# Patient Record
Sex: FEMALE | Race: WHITE | URBAN - METROPOLITAN AREA
[De-identification: names, ages, dates, MRNs, and addresses within clinical notes are randomized per-mention and may not be internally consistent; named-entity substitution may affect disease eponyms.]

---

## 2017-12-01 ENCOUNTER — APPOINTMENT (OUTPATIENT)
Dept: CT IMAGING | Facility: CLINIC | Age: 82
DRG: 066 | End: 2017-12-01
Attending: EMERGENCY MEDICINE
Payer: MEDICARE

## 2017-12-01 ENCOUNTER — HOSPITAL ENCOUNTER (INPATIENT)
Facility: CLINIC | Age: 82
LOS: 3 days | Discharge: HOME | DRG: 066 | End: 2017-12-04
Attending: EMERGENCY MEDICINE | Admitting: INTERNAL MEDICINE
Payer: MEDICARE

## 2017-12-01 ENCOUNTER — APPOINTMENT (OUTPATIENT)
Dept: MRI IMAGING | Facility: CLINIC | Age: 82
DRG: 066 | End: 2017-12-01
Attending: INTERNAL MEDICINE
Payer: MEDICARE

## 2017-12-01 DIAGNOSIS — I48.0 PAROXYSMAL ATRIAL FIBRILLATION (H): Primary | ICD-10-CM

## 2017-12-01 DIAGNOSIS — I63.9 CEREBROVASCULAR ACCIDENT (CVA), UNSPECIFIED MECHANISM (H): ICD-10-CM

## 2017-12-01 LAB
ANION GAP SERPL CALCULATED.3IONS-SCNC: 8 MMOL/L (ref 3–14)
APTT PPP: 24 SEC (ref 22–37)
BASOPHILS # BLD AUTO: 0 10E9/L (ref 0–0.2)
BASOPHILS NFR BLD AUTO: 0.1 %
BUN SERPL-MCNC: 16 MG/DL (ref 7–30)
CALCIUM SERPL-MCNC: 9.1 MG/DL (ref 8.5–10.1)
CHLORIDE SERPL-SCNC: 108 MMOL/L (ref 94–109)
CHOLEST SERPL-MCNC: 137 MG/DL
CO2 SERPL-SCNC: 24 MMOL/L (ref 20–32)
CREAT SERPL-MCNC: 0.94 MG/DL (ref 0.52–1.04)
CRP SERPL-MCNC: <2.9 MG/L (ref 0–8)
DIFFERENTIAL METHOD BLD: NORMAL
EOSINOPHIL # BLD AUTO: 0 10E9/L (ref 0–0.7)
EOSINOPHIL NFR BLD AUTO: 0.1 %
ERYTHROCYTE [DISTWIDTH] IN BLOOD BY AUTOMATED COUNT: 13.3 % (ref 10–15)
GFR SERPL CREATININE-BSD FRML MDRD: 57 ML/MIN/1.7M2
GLUCOSE SERPL-MCNC: 144 MG/DL (ref 70–99)
HBA1C MFR BLD: 6.2 % (ref 4.3–6)
HCT VFR BLD AUTO: 42.2 % (ref 35–47)
HDLC SERPL-MCNC: 65 MG/DL
HGB BLD-MCNC: 14 G/DL (ref 11.7–15.7)
IMM GRANULOCYTES # BLD: 0 10E9/L (ref 0–0.4)
IMM GRANULOCYTES NFR BLD: 0.1 %
INR PPP: 0.98 (ref 0.86–1.14)
LDLC SERPL CALC-MCNC: 53 MG/DL
LYMPHOCYTES # BLD AUTO: 1.5 10E9/L (ref 0.8–5.3)
LYMPHOCYTES NFR BLD AUTO: 19.4 %
MCH RBC QN AUTO: 30.8 PG (ref 26.5–33)
MCHC RBC AUTO-ENTMCNC: 33.2 G/DL (ref 31.5–36.5)
MCV RBC AUTO: 93 FL (ref 78–100)
MONOCYTES # BLD AUTO: 0.5 10E9/L (ref 0–1.3)
MONOCYTES NFR BLD AUTO: 6.5 %
NEUTROPHILS # BLD AUTO: 5.6 10E9/L (ref 1.6–8.3)
NEUTROPHILS NFR BLD AUTO: 73.8 %
NONHDLC SERPL-MCNC: 72 MG/DL
NRBC # BLD AUTO: 0 10*3/UL
NRBC BLD AUTO-RTO: 0 /100
PLATELET # BLD AUTO: 209 10E9/L (ref 150–450)
POTASSIUM SERPL-SCNC: 3.9 MMOL/L (ref 3.4–5.3)
RBC # BLD AUTO: 4.54 10E12/L (ref 3.8–5.2)
SODIUM SERPL-SCNC: 140 MMOL/L (ref 133–144)
TRIGL SERPL-MCNC: 93 MG/DL
TROPONIN I SERPL-MCNC: <0.015 UG/L (ref 0–0.04)
TSH SERPL DL<=0.005 MIU/L-ACNC: 2 MU/L (ref 0.4–4)
WBC # BLD AUTO: 7.5 10E9/L (ref 4–11)

## 2017-12-01 PROCEDURE — 85730 THROMBOPLASTIN TIME PARTIAL: CPT | Performed by: EMERGENCY MEDICINE

## 2017-12-01 PROCEDURE — 36415 COLL VENOUS BLD VENIPUNCTURE: CPT | Performed by: INTERNAL MEDICINE

## 2017-12-01 PROCEDURE — 25000128 H RX IP 250 OP 636: Performed by: INTERNAL MEDICINE

## 2017-12-01 PROCEDURE — 25000132 ZZH RX MED GY IP 250 OP 250 PS 637: Mod: GY | Performed by: EMERGENCY MEDICINE

## 2017-12-01 PROCEDURE — 99223 1ST HOSP IP/OBS HIGH 75: CPT | Mod: AI | Performed by: INTERNAL MEDICINE

## 2017-12-01 PROCEDURE — 80048 BASIC METABOLIC PNL TOTAL CA: CPT | Performed by: EMERGENCY MEDICINE

## 2017-12-01 PROCEDURE — 82306 VITAMIN D 25 HYDROXY: CPT | Performed by: INTERNAL MEDICINE

## 2017-12-01 PROCEDURE — 25000128 H RX IP 250 OP 636: Performed by: EMERGENCY MEDICINE

## 2017-12-01 PROCEDURE — 80061 LIPID PANEL: CPT | Performed by: INTERNAL MEDICINE

## 2017-12-01 PROCEDURE — A9270 NON-COVERED ITEM OR SERVICE: HCPCS | Mod: GY | Performed by: EMERGENCY MEDICINE

## 2017-12-01 PROCEDURE — 85025 COMPLETE CBC W/AUTO DIFF WBC: CPT | Performed by: EMERGENCY MEDICINE

## 2017-12-01 PROCEDURE — 25000125 ZZHC RX 250: Performed by: EMERGENCY MEDICINE

## 2017-12-01 PROCEDURE — 70450 CT HEAD/BRAIN W/O DYE: CPT

## 2017-12-01 PROCEDURE — 12000000 ZZH R&B MED SURG/OB

## 2017-12-01 PROCEDURE — 84443 ASSAY THYROID STIM HORMONE: CPT | Performed by: INTERNAL MEDICINE

## 2017-12-01 PROCEDURE — 70470 CT HEAD/BRAIN W/O & W/DYE: CPT | Mod: XS

## 2017-12-01 PROCEDURE — 84484 ASSAY OF TROPONIN QUANT: CPT | Performed by: INTERNAL MEDICINE

## 2017-12-01 PROCEDURE — 86140 C-REACTIVE PROTEIN: CPT | Performed by: INTERNAL MEDICINE

## 2017-12-01 PROCEDURE — 83036 HEMOGLOBIN GLYCOSYLATED A1C: CPT | Performed by: INTERNAL MEDICINE

## 2017-12-01 PROCEDURE — 99285 EMERGENCY DEPT VISIT HI MDM: CPT | Mod: 25

## 2017-12-01 PROCEDURE — A9585 GADOBUTROL INJECTION: HCPCS | Performed by: INTERNAL MEDICINE

## 2017-12-01 PROCEDURE — 70498 CT ANGIOGRAPHY NECK: CPT

## 2017-12-01 PROCEDURE — 70553 MRI BRAIN STEM W/O & W/DYE: CPT

## 2017-12-01 PROCEDURE — 85610 PROTHROMBIN TIME: CPT | Performed by: EMERGENCY MEDICINE

## 2017-12-01 RX ORDER — ASPIRIN 300 MG/1
300 SUPPOSITORY RECTAL ONCE
Status: COMPLETED | OUTPATIENT
Start: 2017-12-01 | End: 2017-12-01

## 2017-12-01 RX ORDER — LABETALOL HYDROCHLORIDE 5 MG/ML
10-40 INJECTION, SOLUTION INTRAVENOUS EVERY 10 MIN PRN
Status: DISCONTINUED | OUTPATIENT
Start: 2017-12-01 | End: 2017-12-04 | Stop reason: HOSPADM

## 2017-12-01 RX ORDER — GADOBUTROL 604.72 MG/ML
9 INJECTION INTRAVENOUS ONCE
Status: COMPLETED | OUTPATIENT
Start: 2017-12-01 | End: 2017-12-01

## 2017-12-01 RX ORDER — SODIUM CHLORIDE 9 MG/ML
INJECTION, SOLUTION INTRAVENOUS CONTINUOUS
Status: DISCONTINUED | OUTPATIENT
Start: 2017-12-01 | End: 2017-12-03

## 2017-12-01 RX ORDER — IOPAMIDOL 755 MG/ML
120 INJECTION, SOLUTION INTRAVASCULAR ONCE
Status: COMPLETED | OUTPATIENT
Start: 2017-12-01 | End: 2017-12-01

## 2017-12-01 RX ORDER — ONDANSETRON 2 MG/ML
4 INJECTION INTRAMUSCULAR; INTRAVENOUS EVERY 6 HOURS PRN
Status: DISCONTINUED | OUTPATIENT
Start: 2017-12-01 | End: 2017-12-03

## 2017-12-01 RX ORDER — ONDANSETRON 4 MG/1
4 TABLET, ORALLY DISINTEGRATING ORAL EVERY 6 HOURS PRN
Status: DISCONTINUED | OUTPATIENT
Start: 2017-12-01 | End: 2017-12-03

## 2017-12-01 RX ORDER — MULTIVITAMIN,THERAPEUTIC
1 TABLET ORAL DAILY
COMMUNITY
End: 2017-12-01

## 2017-12-01 RX ORDER — NALOXONE HYDROCHLORIDE 0.4 MG/ML
.1-.4 INJECTION, SOLUTION INTRAMUSCULAR; INTRAVENOUS; SUBCUTANEOUS
Status: DISCONTINUED | OUTPATIENT
Start: 2017-12-01 | End: 2017-12-04 | Stop reason: HOSPADM

## 2017-12-01 RX ORDER — HYDRALAZINE HYDROCHLORIDE 20 MG/ML
10-20 INJECTION INTRAMUSCULAR; INTRAVENOUS
Status: DISCONTINUED | OUTPATIENT
Start: 2017-12-01 | End: 2017-12-04 | Stop reason: HOSPADM

## 2017-12-01 RX ORDER — METOPROLOL TARTRATE 1 MG/ML
2.5 INJECTION, SOLUTION INTRAVENOUS EVERY 4 HOURS PRN
Status: DISCONTINUED | OUTPATIENT
Start: 2017-12-01 | End: 2017-12-04 | Stop reason: HOSPADM

## 2017-12-01 RX ORDER — ACETAMINOPHEN 650 MG/1
650 SUPPOSITORY RECTAL EVERY 4 HOURS PRN
Status: DISCONTINUED | OUTPATIENT
Start: 2017-12-01 | End: 2017-12-04 | Stop reason: HOSPADM

## 2017-12-01 RX ADMIN — IOPAMIDOL 120 ML: 755 INJECTION, SOLUTION INTRAVENOUS at 16:00

## 2017-12-01 RX ADMIN — ASPIRIN 300 MG: 300 SUPPOSITORY RECTAL at 17:41

## 2017-12-01 RX ADMIN — GADOBUTROL 9 ML: 604.72 INJECTION INTRAVENOUS at 21:44

## 2017-12-01 RX ADMIN — SODIUM CHLORIDE: 9 INJECTION, SOLUTION INTRAVENOUS at 17:58

## 2017-12-01 RX ADMIN — SODIUM CHLORIDE 100 ML: 9 INJECTION, SOLUTION INTRAVENOUS at 16:00

## 2017-12-01 ASSESSMENT — ENCOUNTER SYMPTOMS
SPEECH DIFFICULTY: 1
WEAKNESS: 1

## 2017-12-01 ASSESSMENT — VISUAL ACUITY: OU: NORMAL ACUITY

## 2017-12-01 ASSESSMENT — ACTIVITIES OF DAILY LIVING (ADL): ADLS_ACUITY_SCORE: 10

## 2017-12-01 NOTE — IP AVS SNAPSHOT
` `     [facility]: [phone]                 INTERAGENCY TRANSFER FORM - NOTES (H&P, Discharge Summary, Consults, Procedures, Therapies)   2017                    Hospital Admission Date: 2017  KEYANNA SEGOVIA   : 1935  Sex: Female        Patient PCP Information     Provider PCP Type    Dia Davenprot MD General      History & Physicals     No notes of this type exist for this encounter.      Discharge Summaries     No notes of this type exist for this encounter.         Consult Notes      Consults signed by Tito Hayes MD at 12/3/2017  5:07 PM      Author:  Tito Hayes MD Service:  Cardiology Author Type:  Physician    Filed:  12/3/2017  5:07 PM Date of Service:  2017  3:24 PM Creation Time:  2017  9:27 PM    Status:  Signed :  Tito Hayes MD (Physician)         CARDIAC CONSULTATION      DATE OF SERVICE:  2017      REASON FOR CONSULTATION:  Atrial fibrillation with recent cardioembolic stroke.      HISTORY OF PRESENT ILLNESS:  Mrs. Keyanna Segovia is a 82-year-old female new to the [facility] who arrives following acute onset slurred speech and right-sided deficits suggestive of signs of stroke.  She has been hospitalized and was outside the window for the provision of TPA, and has had confirmatory imaging of acute left posterolateral infarct.  She has been seen by Neurology and has recently been engaged with oral anticoagulation with apixaban.  Given her atrial fibrillation history, a Cardiology consultation was undertaken.  Additional background history includes paroxysmal atrial fibrillation, for which she is chronically rhythm controlled with Multaq.  She has not engaged in chronic anticoagulation therapy due to personal preference.  Additional history includes obesity, hyperlipidemia, prior colon cancer, status post colectomy, and major depressive disorder.      In speaking with Mrs. Segovia,  she denies a history of overt cardiac complaints.  She specifically denies spells of tachy palpitations.  While in atrial fibrillation, she does not perceive irregular or tachy palpitations.  She denies overt heart failure syndrome.  She denies chest pain syndrome.  She has not sustained syncope or presyncopal events.  She endorses that she has felt overall well until her stroke event.      Vital signs in the chart indicate a blood pressure of 142/74, pulse of 66, saturating acceptably well on room air, and afebrile.  Ins and outs are acceptable.      A 12-lead ECG demonstrates coarse atrial fibrillation with rapid ventricular response, possible anterior septal infarct versus low anterior forces present.  No other abnormal findings.  No findings suggestive of ischemia.  Positive 1 aberrantly conducted complex with a left bundle branch block pattern.      CBC is without abnormalities.  Hemoglobin is 14.0.  Creatinine is normal at 0.94.  Sodium and potassium are within acceptable limits of 140 and 3.9, respectively.  INR is 0.98.      There is no transthoracic echocardiogram available for my review.  There is no prior coronary angiogram or stress testing available for my review.      CTA imaging confirms a left posterolateral infarction without evidence of major vessel stenoses.  No hemorrhagic transformation was present.      SOCIAL HISTORY:  The patient does not use tobacco, alcohol or drugs.  She is retired and .  She lives independently.      FAMILY HISTORY:  No family history of stroke or cardiac disease.      REVIEW OF SYSTEMS:  Otherwise negative except as what is noted in the HPI.      PHYSICAL EXAMINATION:   GENERAL:  Well-appearing female, appears younger than stated age, obese, sensorium clear, speech forced and garbled on occasions.  Able to communicate freely, however.  Relaxed, nondyspneic.   HEENT:  Right facial slurring otherwise no major abnormalities.   LUNGS:  Clear to auscultation  bilaterally.  Acceptable air movement.   CARDIOVASCULAR:  Irregular rhythm, rapid.  Variable S1.  Positive systolic flow murmur, loudest at the cardiac base.  No other murmurs appreciated.   SKIN:  No precordial heaves.     VESSELS:  JVP appears to be nonelevated.   EXTREMITIES:  Right lower extremity edema present (mild to moderate).  Trace left lower extremity edema.  Normal perfusion bilaterally.      IMPRESSION, REPORT, PLAN:   1.  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     2.  Paroxysmal atrial fibrillation, with current rapid ventricular rates, now on Eliquis. Chronic Multaq use.     3.  Hyperlipidemia.   4.  Obesity.     5.  Major depressive disorder.     6.  History of colon cancer, status post colectomy.        Mrs. Burgess has unfortunately sustained a left hemispheric stroke with persistent neurologic deficits.  We agree with the overall assessment that it is likely cardioembolic, and we would especially recommend long-term anticoagulation in this setting.  Apixaban is a very good treatment option for her, and we agree with this approach.      As it pertains to her atrial fibrillation, she is asymptomatic at this time.  She is without evidence of cardiac decompensation.  She is, however, with rapid ventricular rates that does warrant better rate control.  I will provide 25 mg of metoprolol b.i.d. to start off rate control measures.  We will get a transthoracic echocardiogram to evaluate her overall myocardial function and/or presence of structural heart disease.  Depending on the echo, we may make alternative arrangements concerning Multaq antiarrhythmic versus an alternative agent.  Another consideration would be that, since she is without symptoms, she may not need long-term antiarrhythmic therapy at all.  I will await the results of the transthoracic echocardiogram to further this discussion with the patient.      Thank you for this consultation.  Will continue to follow along.         TYREE  CANDACE MCKEON MD             D: 2017 15:24   T: 2017 21:26   MT:       Name:     KEYANNA SEGOVIA   MRN:      [id number]        Account:       [id number]   :      1935           Consult Date:  2017      Document: [id number]    [AM1.1]   Revision History        User Key Date/Time User Provider Type Action    > AM1.1 12/3/2017  5:07 PM Tito Mckeon MD Physician Sign            Consults by Betsey Hill MD at 2017  9:47 AM     Author:  Betsey Hill MD Service:  Neurology Author Type:  Physician    Filed:  2017 10:53 AM Date of Service:  2017  9:47 AM Creation Time:  2017  9:28 AM    Status:  Addendum :  Betsey Hill MD (Physician)     Consult Orders:    1. Neurology IP Consult: Patient to be seen: Routine - within 24 hours; Right facial droop and hand weakness, slurred speech, please eval, thank you; Consultant may enter orders: Yes [[id number]] ordered by Jessee Medellin MD at 17 1700                Neurology Consultation Note          Assessment and Plan:     Left hemispheric cardioembolic infarction    Paroxysmal atrial fibrillation    She does not have any contraindications to anticoagulation, therefore I would recommend repeating a HCT today and if there is no hemorrhagic conversion to start anticoagulation. I would recommend apixaban once she has passed her swallow evaluation. If swallowing is significantly affected and she is NPO (which I think unlikely) then we would anticoagulate with heparin initially. Start rehabilitation effort. She has not seen her cardiologist in 3 years a=since he was retired, so a review by cardiology may be useful. The family are in favor of a cardiology consultation. We will defer to hospitalist service. Discussed with patient and her daughters.[SP1.1]    ADDENDUM: repeat HCT without hemorrhagic conversion,[SP1.2] patient able to swallow,[SP1.3] we will start apixaban.[SP1.2]         History of  Present Illness:   I was asked to provide neurological consultation for Mrs. Daija Burgess with regard to a recent stroke. She is attended by two daughters. She lives in [place], and yesterday am she boarded an airplane to come to [place] for a family function. While boarding she noticed that she had some word finding difficulty. During the flight she did not talk much, and felt that her right hand was slightly weak, especially fingers II-III. No numbness. Her speech was noted to be slurred as well, so she cane to the hospital for further evaluation. Some f her symptoms had improved in the interim, and the work up in the ED showed no large vessel occlusion or bleed. She did take 3 low intensity aspirins before arriving to the hospital. She was outside the window for IV tPA. A brain MRI obtained last night confirmed a left cortical  embolic infarct. Her tele strips have been in NSR overnight. She has a history of PAF which has been treated with Multaq for many years and she has not had any TIAs. However in the last few months she has had episodes where she feels weak and devoid of energy, without any sensation of palpitations. I accessed her records from [place], and it does not look like there has been any specific contraindication to anticoagulation. She has a rthritic gait pattern, but no falls and she is independent in her ambulation.          Review of Systems:   The 10 point Review of Systems is negative other than noted in the HPI. She has chronic vertigo, for which she is on zonisamide, which has helped.           Past Medical History:[SP1.1]     Past Medical History:   Diagnosis Date     Cancer (H)      Depressive disorder      Vertigo[SP1.4]             Past Surgical History:[SP1.1]     Past Surgical History:   Procedure Laterality Date     CHOLECYSTECTOMY       COLONOSCOPY       ORTHOPEDIC SURGERY[SP1.5]              Medications:[SP1.1]       sodium chloride (PF)  3 mL Intracatheter Q8H     naloxone, -  "MEDICATION INSTRUCTIONS -, acetaminophen, ondansetron **OR** ondansetron, labetalol, hydrALAZINE, metoprolol, sodium chloride (PF)[SP1.6]                   Allergies:   None; however she states that she is sensitive to many medications.         Family History:   Mother and sister had strokes.         Social History:[SP1.1]     Social History     Social History     Marital status:      Spouse name: N/A     Number of children: N/A     Years of education: N/A     Occupational History     Not on file.     Social History Main Topics     Smoking status: Never Smoker     Smokeless tobacco: Not on file     Alcohol use No     Drug use: No     Sexual activity: Not on file     Other Topics Concern     Not on file     Social History Narrative     No narrative on file[SP1.7]                 Physical Exam:   Vitals were reviewed[SP1.1]  Blood pressure 139/71, pulse 66, temperature 98.2  F (36.8  C), temperature source Oral, resp. rate 18, height 1.651 m (5' 5\"), weight 98.7 kg (217 lb 8 oz), SpO2 97 %.  Wt Readings from Last 4 Encounters:   17 98.7 kg (217 lb 8 oz)[SP1.6]     Temperatures:  Current - Temp: 98.2  F (36.8  C); Max - Temp  Av.9  F (36.6  C)  Min: 97.5  F (36.4  C)  Max: 98.2  F (36.8  C)  Blood pressure range: Systolic (24hrs), Av , Min:139 , Max:170   ; Diastolic (24hrs), Av, Min:71, Max:82[SP1.1]    I/O last 3 completed shifts:  In: 403 [I.V.:403]  Out: 650 [Urine:650][SP1.6]  General appearance:  healthy, alert and no distress, appears hydarated, vital signs stable  and overweight  Cardiovascular:  NORMAL - regular rate and rhythm without murmur. and carotids with brisk upstroke/without bruit bilaterally easily palpated bilaterally  Neurologic:   Mental Status Exam:   Level of Alertness:   awake  Orientation:   normal to person, place, time  Memory:   normal for age (2/3)  Fund of Knowledge:  normal  Attention/Concentration:  normal  Language:  Slurred with word finding difficulty; " naming, reading, repetition, commands are normal.  Cranial Nerves: Visual fields: full; EOM: intact; Pupils: PERRL; Fundi: No papilledema; V, VII: no facial asymmetry to sensory, very slight right facial weakness; tongue, palate: midline; shoulder and neck muscle strength: symmetric. Moderate dysarthria.  Motor Exam:  moves all extremities well and symmetrically.  Sensory:  sensory intact to light touch/vibration.  Coordination: finger/Nose:  right:  normal; left:  Normal; heel-Knee-Shin:  right:  normal; left:  normal; rapid Alternating Movements:  right:  normal; left:  normal.  Gait: independent, slightly waddling.  Deep Tendon Reflexes:  reflexes are hypoactive and symmetrical, except for a reduced left KJ compared to right; plantar response:  Right: flexor; left: flexor.              Data:   All laboratory and imaging data reviewed by me:[SP1.1]  Results for orders placed or performed during the hospital encounter of 12/01/17 (from the past 24 hour(s))   Basic metabolic panel   Result Value Ref Range    Sodium 140 133 - 144 mmol/L    Potassium 3.9 3.4 - 5.3 mmol/L    Chloride 108 94 - 109 mmol/L    Carbon Dioxide 24 20 - 32 mmol/L    Anion Gap 8 3 - 14 mmol/L    Glucose 144 (H) 70 - 99 mg/dL    Urea Nitrogen 16 7 - 30 mg/dL    Creatinine 0.94 0.52 - 1.04 mg/dL    GFR Estimate 57 (L) >60 mL/min/1.7m2    GFR Estimate If Black 69 >60 mL/min/1.7m2    Calcium 9.1 8.5 - 10.1 mg/dL   CBC with platelets differential   Result Value Ref Range    WBC 7.5 4.0 - 11.0 10e9/L    RBC Count 4.54 3.8 - 5.2 10e12/L    Hemoglobin 14.0 11.7 - 15.7 g/dL    Hematocrit 42.2 35.0 - 47.0 %    MCV 93 78 - 100 fl    MCH 30.8 26.5 - 33.0 pg    MCHC 33.2 31.5 - 36.5 g/dL    RDW 13.3 10.0 - 15.0 %    Platelet Count 209 150 - 450 10e9/L    Diff Method Automated Method     % Neutrophils 73.8 %    % Lymphocytes 19.4 %    % Monocytes 6.5 %    % Eosinophils 0.1 %    % Basophils 0.1 %    % Immature Granulocytes 0.1 %    Nucleated RBCs 0 0 /100     Absolute Neutrophil 5.6 1.6 - 8.3 10e9/L    Absolute Lymphocytes 1.5 0.8 - 5.3 10e9/L    Absolute Monocytes 0.5 0.0 - 1.3 10e9/L    Absolute Eosinophils 0.0 0.0 - 0.7 10e9/L    Absolute Basophils 0.0 0.0 - 0.2 10e9/L    Abs Immature Granulocytes 0.0 0 - 0.4 10e9/L    Absolute Nucleated RBC 0.0    INR   Result Value Ref Range    INR 0.98 0.86 - 1.14   Partial thromboplastin time   Result Value Ref Range    PTT 24 22 - 37 sec   CT Head w/o Contrast    Narrative    CT SCAN OF THE HEAD WITHOUT CONTRAST   12/1/2017 3:57 PM     HISTORY: Code stroke.    TECHNIQUE:  Axial images of the head and coronal reformations without  IV contrast material.  Radiation dose for this scan was reduced using  automated exposure control, adjustment of the mA and/or kV according  to patient size, or iterative reconstruction technique.    COMPARISON: None.    FINDINGS: There is some mild cerebral atrophy. Minimal nonspecific  white matter changes are present without mass effect. There is no  evidence for intracranial hemorrhage, mass effect, acute infarct, or  skull fracture.      Impression    IMPRESSION: Chronic changes. No evidence for intracranial hemorrhage  or any acute process.      SAI ESTRADA MD   CTA Angiogram Head Neck    Narrative    CTA ANGIOGRAM HEAD NECK  12/1/2017 4:04 PM     HISTORY: code stroke;     TECHNIQUE:  Precontrast localizing scans were followed by CT  angiography with an injection of 70 mL Isovue -370 IV contrast with  scans through the head and neck.  Images were transferred to a  separate 3-D workstation where multiplanar reformations and 3-D images  were created.  Estimates of carotid stenoses are made relative to the  distal internal carotid artery diameters except as noted. Radiation  dose for this scan was reduced using automated exposure control,  adjustment of the mA and/or kV according to patient size, or iterative  reconstruction technique.    COMPARISON: None.    FINDINGS: Estimates of stenosis at the  carotid bifurcations are  relative to the distal internal carotids.    Arch: Calcified atherosclerotic plaque in the arch. Normal anatomy.    Neck:  Right Carotid:  The right common carotid artery is normal.  Calcified  atherosclerotic plaque is seen at the right carotid bifurcation. No  significant stenosis is seen..  Calcified plaque in the carotid  siphon..     Left Carotid:  Left common carotid artery is tortuous..  Calcified  atherosclerotic plaque is seen at the left carotid bifurcation. No  significant stenosis is seen. Stenosis was calculated at 25%..  Calcified atherosclerotic plaque is seen in the left carotid siphon..       Vertebrals:  The vertebral arteries are normal.    Head:  Right Carotid:No occluded vessels are seen. .    Left Carotid:  No occluded vessels are identified. .    Basilar:  The basilar artery and its branches appear normal.       Impression    IMPRESSION:   1. No significant stenosis is seen at either carotid bifurcation.  2. No arterial dissection is seen.  3. No occluded intracranial vessels or high-grade intracranial  vascular stenosis is identified.    I agree with the preliminary report that was communicated to the  referring physician.     CYNTHIA NOGUERA MD   CT Head w Contrast    Narrative    CT HEAD WITH CONTRAST 12/1/2017 4:08 PM     HISTORY: Aphasia.    TECHNIQUE: Axial images were obtained through the brain with  intravenous contrast for CT brain perfusion imaging. 50 mL of  Isovue-370 was given. Multiplanar reconstructions were performed.  Radiation dose for this scan was reduced using automated exposure  control, adjustment of the mA and/or kV according to patient size, or  iterative reconstruction technique..    FINDINGS: Cerebral blood flow, cerebral blood volume, mean transit  time maps are symmetric. There is no evidence for ischemia or infarct.      Impression    IMPRESSION: Negative CT brain perfusion imaging.    SAI ESTRADA MD   Troponin I   Result Value Ref Range     Troponin I ES <0.015 0.000 - 0.045 ug/L   CRP inflammation   Result Value Ref Range    CRP Inflammation <2.9 0.0 - 8.0 mg/L   Hemoglobin A1c   Result Value Ref Range    Hemoglobin A1C 6.2 (H) 4.3 - 6.0 %   Lipid panel reflex to direct LDL   Result Value Ref Range    Cholesterol 137 <200 mg/dL    Triglycerides 93 <150 mg/dL    HDL Cholesterol 65 >49 mg/dL    LDL Cholesterol Calculated 53 <100 mg/dL    Non HDL Cholesterol 72 <130 mg/dL   TSH with free T4 reflex   Result Value Ref Range    TSH 2.00 0.40 - 4.00 mU/L   MRI Brain w & w/o contrast    Narrative    MR BRAIN W/O & W CONTRAST  12/1/2017 9:44 PM     HISTORY:  Acute CVI;     TECHNIQUE: Multiplanar, multisequence MRI of the brain without and  with 9mL gadavist IV contrast material.    COMPARISON: CT studies dated 12/1/2017.    FINDINGS:  Diffusion-weighted images reveal area of restricted  diffusion in the lateral cortex of the left posterior frontal lobe.  This area of restricted diffusion measures approximately 3 cm in  transverse dimension by 1 cm in AP dimension. This area of restricted  diffusion is consistent with an acute cortical infarct..  There is no  hemorrhage or mass effect.. There are no gadolinium enhancing lesions.   The facial structures appear normal.  The arteries at the base of the  brain and the dural venous sinuses appear patent.      Impression    IMPRESSION: Study is consistent with an acute infarct in the lateral  cortex of the left frontal lobe.     CYNTHIA NOGUERA MD   Troponin I   Result Value Ref Range    Troponin I ES <0.015 0.000 - 0.045 ug/L[SP1.6]            Revision History        User Key Date/Time User Provider Type Action    > SP1.3 12/2/2017 10:53 AM Betsey Hill MD Physician Addend     SP1.2 12/2/2017 10:52 AM Betsey Hill MD Physician Addend     SP1.7 12/2/2017  9:47 AM Betsey Hill MD Physician Sign     SP1.5 12/2/2017  9:42 AM Betsey Hill MD Physician      SP1.4 12/2/2017  9:41 AM Liz  Betsey BROWN MD Physician      SP1.6 12/2/2017  9:29 AM Betsey Hill MD Physician      SP1.1 12/2/2017  9:28 AM Betsey Hill MD Physician                      Progress Notes - Physician (Notes from 12/01/17 through 12/04/17)      Progress Notes by Rebecca Rosen RN at 12/4/2017 11:54 AM     Author:  Rebecca Rosen RN Service:  (none) Author Type:      Filed:  12/4/2017  1:59 PM Date of Service:  12/4/2017 11:54 AM Creation Time:  12/4/2017 11:54 AM    Status:  Addendum :  Rebecca Rosen RN ()         Discussed dc plan with Guera White NP.  She will order a Holter monitor for dc and would like pt to have a f/u appt in 2 weeks with EP NP.  Contacted [facility] and appt made with Jessica Martínez for 12/18.  Pt will need a PCP as she is staying in [place] to recover for a couple months rather than dc back home to [place].  Will meet with pt.[GN1.1]     Addendum:  Met w/ pt to discuss dc plan.  She lives alone in [place] but she has friends and family there as well.  She will be staying with her dtr who lives in [place] for now.  She needs a [place] PCP while she is here.  She would like to go to [facility] so have made an appt for her with Dr Davenport for 12/8.  Her PCP in [place] is Katie Figueroa.   Her cardiologist and her neurologist in [place] have both retired so she will need to get referrals from Dr Figueroa for both.[GN1.2]  Will send pt with CD of her imaging and paper copy of her chart to bring home to [place] when she returns. Updated bedside RN.[GN1.3]      Revision History        User Key Date/Time User Provider Type Action    > GN1.3 12/4/2017  1:59 PM Rebecca Rosen RN Case Manager Addend     GN1.2 12/4/2017 12:43 PM Rebecca Rosen RN Case Manager Addend     GN1.1 12/4/2017 11:56 AM Rebecca Rosen, RN Case Manager Sign            Progress Notes by Chanda Sauer MD at 12/4/2017 11:30 AM     Author:  Chanda Sauer MD Service:  Cardiology Author Type:  Physician     Filed:  12/4/2017 12:50 PM Date of Service:  12/4/2017 11:30 AM Creation Time:  12/4/2017 11:30 AM    Status:  Signed :  Chanda Sauer MD (Physician)         [facility]    Cardiology Progress Note    Date of Service (when I saw the patient):[SS1.1] 12/04/2017     Assessment & Plan[SS1.2]   Daija Burgess is a 82 year old female who was admitted on 12/1/2017 who wad admitted with symptoms suggestive of signs of stroke.  She has been hospitalized and was outside the window for the provision of TPA, and has had confirmatory imaging of acute left posterolateral infarct. Oral anticoagulation with apixaban was started.  Her PMH  included paroxysmal atrial fibrillation, for which she is chronically rhythm controlled with Multaq.  She has not engaged in chronic anticoagulation therapy due to personal preference.      1. Atrial 'Fib- now back in SR with rate in 60's- on Multaq 400 mg bid and metoprolol 50 mg bid  Echo demonstrated normal finding with mild dilated left atrium. LV EF=55-60 %    PLAN  1. Reduce metoprolol to 25 mg bid  2. Continue with her chronic dose of Multaq 400mg bid  3. Since she will be in town for next 6 weeks will order a 24 hour holter monitor in 1-2 weeks with a follow-up appt with EP JETT.    Appointments made and reviewed with Patient      JUNE Penny, SHAKIRA[SS1.1]    ATTESTATION:  Ms. Burgess was seen and examined. Agree with note and plan of care.   FRANSICO Sauer MD[SM1.1]     Interval History[SS1.2]    Feels tired no palpitations.[SS1.1]    Physical Exam   Temp: 97.6  F (36.4  C) Temp src: Oral BP: 116/63   Heart Rate: 58 Resp: 16 SpO2: 93 % O2 Device: None (Room air)    Vitals:    12/01/17 1526 12/01/17 1718   Weight: 90.7 kg (200 lb) 98.7 kg (217 lb 8 oz)[SS1.2]     Vital Signs with Ranges[SS1.1]  Temp:  [97.5  F (36.4  C)-98.3  F (36.8  C)] 97.6  F (36.4  C)  Heart Rate:  [] 58  Resp:  [16-20] 16  BP: (114-149)/(63-86) 116/63  SpO2:  [90 %-96 %]  93 %  I/O last 3 completed shifts:  In: 470 [P.O.:40; I.V.:430]  Out: 600 [Urine:600][SS1.2]    PHYSICAL EXAMINATION:   GENERAL:  Well-appearing female, appears younger than stated age, obese, sensorium clear, speech forced and garbled on occasions.  Able to communicate freely, however.   HEENT:  Right facial slurring otherwise no major abnormalities.   LUNGS:  Clear to auscultation bilaterally.  Acceptable air movement.   CARDIOVASCULAR:  Regular rate in the 50's.    VESSELS:  JVP is nonelevated.   EXTREMITIES:  Right lower extremity edema present (mild to moderate).  Trace left lower extremity edema.  Normal perfusion bilaterally[SS1.1]    Medications     - MEDICATION INSTRUCTIONS -         docusate sodium (COLACE) capsule 100 mg  100 mg Oral Daily     escitalopram (LEXAPRO) tablet 5 mg  5 mg Oral Daily     zonisamide (ZONEGRAN) capsule 100 mg  100 mg Oral At Bedtime     metoprolol  50 mg Oral BID     dronedarone  400 mg Oral BID w/meals     pravastatin (PRAVACHOL) tablet 10 mg  10 mg Oral QPM     apixaban ANTICOAGULANT  5 mg Oral BID     sodium chloride (PF)  3 mL Intracatheter Q8H       Data[SS1.2]   I personally reviewed Telemetry SR with rate in the 58[SS1.1]  Results for orders placed or performed during the hospital encounter of 12/01/17 (from the past 24 hour(s))   Glucose by meter   Result Value Ref Range    Glucose 101 (H) 70 - 99 mg/dL   Glucose by meter   Result Value Ref Range    Glucose 103 (H) 70 - 99 mg/dL   Creatinine   Result Value Ref Range    Creatinine 0.94 0.52 - 1.04 mg/dL    GFR Estimate 57 (L) >60 mL/min/1.7m2    GFR Estimate If Black 69 >60 mL/min/1.7m2[SS1.2]        Revision History        User Key Date/Time User Provider Type Action    > SM1.1 12/4/2017 12:50 PM Chanda Sauer MD Physician Sign     SS1.2 12/4/2017 12:04 PM Guera White APRN CNP Nurse Practitioner Sign     SS1.1 12/4/2017 11:30 AM Guera White APRN CNP Nurse Practitioner             Progress Notes by  Geraldine Harris SLP at 12/4/2017 12:49 PM     Author:  Geraldine Harris SLP Service:  (none) Author Type:  Speech Language    Filed:  12/4/2017 12:49 PM Date of Service:  12/4/2017 12:49 PM Creation Time:  12/4/2017 12:49 PM    Status:  Signed :  Geraldine Harris SLP (Speech Language)            12/04/17 1042   General Information, SLP   Type of Evaluation Speech and Language;Dysarthria   Type of Visit Initial   Start of Care Date 12/04/17   Onset of Illness/Injury or Date of Surgery - Date 12/01/17   Referring Physician Dr. Medellin   Patient/Family Goals Statement Patient wants to go home.    Pertinent History of Current Problem This is an 82-year-old female with past medical history of atrial fibrillation, on aspirin and Multaq, also vertigo, remote history of colon cancer and depression.  She began having neurological deficits at 6:30 a.m. to include word finding difficulties, had progressed to full slurred speech, some right-sided weakness which seems to be resolving but speech deficits are worsening.  She is being admitted for further workup and treatment for suspected left hemispheric stroke.  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    Precautions/Limitations fall precautions;sternal precautions   General Observations Pleasant and cooperative.    Oral Motor Sensory Function   Completed on Swallow Evaluation Completed Clinical Bedside Swallow Evaluation   Speech   Deficits in Speech Respiration Abdominal   Deficits in Phonation Loudness (soft)   Deficits in Articulation Flaccid dysarthria  (Mixed dysarthria)   Deficits in Resonance None   Deficits in Prosody Disordered intonation patterns   Speech Comments Mild to moderate dysarthria characterized by articulatory  imprecision and slow rate.    Language: Auditory Comprehension (understanding of spoken language)   Tests were administered at the following levels Complex (vocation/community/social  activities)   Paragraph; Discourse Comprehension Test (out of 8 total; less than 7 is below mean) 5   Functional Assessment Scale (Auditory Comprehension) Mild Impairment   Comments (Auditory Comprehension) Length and complexity comprehension decreases.    Language: Verbal Expression (use of spoken language to express information)   Tests were administered at the following levels Complex (vocation/community/social activities)   Generative Naming; Cognitive Linguistic Quick Test Result Below mean   Functional Assessment Scale (Verbal Expression) Minimal Impairment   Comments (Verbal Expression) Slightly below the mean for her age. No apparent word finding noted in conversation.    Reading Comprehension (understanding of written language)   Comments (Reading Comprehension) Did not complete due to HA with vestibular issues when reading.    Cognitive Status Examination   Attention intact   Behavioral Observations alert   Reasoning intact  (At the basic to moderate level. )   Organization (Able to verbally sequence 4 step task. )   General Therapy Interventions   Planned Therapy Interventions Communication;Language   Language Auditory comprehension   Communication Improve speech intelligibility   Clinical Impression, SLP Eval   Criteria for Skilled Therapeutic Interventions Met Yes   SLP Diagnosis Mild to moderate dysarthria.    Functional limitations due to impairments Decrease speech intelligibility.   Rehab Potential Good, to achieve stated therapy goals   Therapy Frequency Daily   Predicted Duration of Therapy Intervention (days/wks) 3 days   Anticipated Discharge Disposition Home with Outpatient Therapy   Risks and Benefits of Treatment have been explained. Yes   Patient, Family & other staff in agreement with plan of care Yes   Clinical Impression Comments Patient presents with minimal to mild language deficits for auditory comprehension at the complex level and verbal language for word fluency. Mild to moderate  dysarthria characterized by articulatory imprecision and slow rate.    Total Evaluation Time      Total Evaluation Time (Minutes) 20[SM1.1]        Revision History        User Key Date/Time User Provider Type Action    > SM1.1 12/4/2017 12:49 PM Geraldine Harris, SLP Speech Language Sign            Progress Notes by Donn Yoder RN at 12/3/2017  6:58 PM     Author:  Donn Yoder RN Service:  (none) Author Type:  Registered Nurse    Filed:  12/3/2017  7:00 PM Date of Service:  12/3/2017  6:58 PM Creation Time:  12/3/2017  6:58 PM    Status:  Signed :  Donn Yoder RN (Registered Nurse)         Pt's -140s (afib with RVR) for 19 min starting at 1839. BP stable, pt asymptomatic. PRN IV 2.5 metoprolol administered per order, HR decreased to low 100s. RN will continue to monitor.[TH1.1]      Revision History        User Key Date/Time User Provider Type Action    > TH1.1 12/3/2017  7:00 PM Dnon Yoder RN Registered Nurse Sign            Progress Notes by Ruben Pink MD at 12/3/2017  4:04 PM     Author:  Ruben Pink MD Service:  Hospitalist Author Type:  Physician    Filed:  12/3/2017  4:35 PM Date of Service:  12/3/2017  4:04 PM Creation Time:  12/3/2017  4:04 PM    Status:  Signed :  Ruben Pink MD (Physician)         [facility]    Hospitalist Progress Note    Date of Service (when I saw the patient):[DB1.1] 12/03/2017    Assessment & Plan[DB1.2]    This is an 82-year-old female with past medical history of atrial fibrillation, on aspirin and Multaq, also vertigo, remote history of colon cancer and depression.  Presented 12/1 for evaluation of word finding difficulties, slurred speech, and right-sided weakness.      PLAN:   1.  Left frontal lobe CVA; stable and improving.  Her CHADS2-VASc score is above 2.   - Appreciate eval. By Neurology today.  -[DB1.1] Appreciate[DB1.3] Cardiology[DB1.1] f/u[DB1.3] re. A-fib  - continue monitoring on telemetry.[DB1.1]  -  "adjust Pravastatin dose per Pharm. rec's.[DB1.3]    2.  Hypertension; pressures stable.  - continue PRN IV labetalol and hydralazine; will continue with permissive blood pressures up to 200 systolic and 100 diastolic.[DB1.1]   - increase metoprolol and continue current monitoring[DB1.3]    3.  Atrial fibrillation, rate[DB1.1] in 130's earlier[DB1.3].[DB1.1]  Appreciate f/u by[DB1.3] Dr. Tito Hayes[DB1.1].[DB1.3]  - continue telemetry;[DB1.1] increase[DB1.3] metoprolol[DB1.1] per Dr. Hayes  - resume Multaq[DB1.3]  - PRN IV metoprolol available    4.  Depression; euthymic and cheerful when I see her.  -[DB1.1] continue[DB1.3] PTA escitalopram    5. Hyperglycemia with elevated A1C; may have pre-diabetes or mild DM.  - glucose testing Qshift; re-eval. 12/3.[DB1.1]    6. H/O vertigo; no new Sx.  - continue zonisamide dosing[DB1.3]     DVT prophylaxis:  PCDs due to risk of possible hemorrhagic conversion.       CODE STATUS: Full Code      DISPOSITION:  Anticipate[DB1.1] 1-2[DB1.3] days[DB1.1]; likely to her daughter's house.[DB1.3]      NICOLE Pink MD, FACP   Internal Medicine Hospitalist[DB1.1]        Interval History[DB1.2]   No new problems; still having some difficulty with[DB1.1] slurred speech/word finding; a bit frustrated by this - says \"I'm a very independent person\"; also upset about having new medications (metoprolol, Multaq), but willing to comply[DB1.3].[DB1.1] U[DB1.3]p walking at least once[DB1.1] today[DB1.3]; no problems[DB1.1].[DB1.3]  Able to eat/feed herself without difficulty.  No headache or vertigo Sx[DB1.1];[DB1.3]  Remainder of ROS negative.    -Data reviewed today: I reviewed all new labs and imaging results over the last 24 hours. I personally reviewed[DB1.1] labs and ECHO report (see below).[DB1.3]    Physical Exam   Temp: 98  F (36.7  C) Temp src: Oral BP: 118/74   Heart Rate: 69 Resp: 16 SpO2: 90 % O2 Device: None (Room air)    Vitals:    12/01/17 1526 12/01/17 1718   Weight: 90.7 kg (200 lb) " 98.7 kg (217 lb 8 oz)[DB1.2]     Vital Signs with Ranges[DB1.1]  Temp:  [97.4  F (36.3  C)-98.3  F (36.8  C)] 98  F (36.7  C)  Heart Rate:  [] 69  Resp:  [16-18] 16  BP: (113-152)/(70-95) 118/74  SpO2:  [90 %-96 %] 90 %  I/O last 3 completed shifts:  In: 2294 [P.O.:440; I.V.:1854]  Out: 2600 [Urine:2600][DB1.2]    Constitutional: No acute distress;[DB1.1] walking in [place], then sitting in chair.[DB1.3]  HEENT: AT/NC; sclerae clear; EOMI. MM's moist.[DB1.1]  Mild Right facial droop/weakness.[DB1.3]  Respiratory: Good air entry bilaterally, no wheezing or rhonchi  Cardiovascular: S1, S2 present, irregular rate and rhythm; no murmur, rubs or gallops  GI: obese; soft, positive bowel sounds, non-tender, non-distended  Skin/Integumen: no edema, no cyanosis, no rashes  Neurologic: awake, conversant,[DB1.1] pleasant[DB1.3]; follows directions well. Speech sl. Slurred at times, and with some difficulty finding words.  Please see Neurology note from earlier today for exam at that time.[DB1.1]     Medications     - MEDICATION INSTRUCTIONS -         docusate sodium (COLACE) capsule 100 mg  100 mg Oral Daily     escitalopram (LEXAPRO) tablet 5 mg  5 mg Oral Daily     zonisamide (ZONEGRAN) capsule 100 mg  100 mg Oral At Bedtime     metoprolol  50 mg Oral BID     dronedarone  400 mg Oral BID w/meals     pravastatin (PRAVACHOL) tablet 10 mg  10 mg Oral QPM     apixaban ANTICOAGULANT  5 mg Oral BID     sodium chloride (PF)  3 mL Intracatheter Q8H       Data     Recent Labs  Lab 12/02/17  0128 12/01/17  1903 12/01/17  1535   WBC  --   --  7.5   HGB  --   --  14.0   MCV  --   --  93   PLT  --   --  209   INR  --   --  0.98   NA  --   --  140   POTASSIUM  --   --  3.9   CHLORIDE  --   --  108   CO2  --   --  24   BUN  --   --  16   CR  --   --  0.94   ANIONGAP  --   --  8   FAINA  --   --  9.1   GLC  --   --  144*   TROPI <0.015 <0.015  --        No results found for this or any previous visit (from the past 24 hour(s)).[DB1.2]      Echocardiogram Complete   Status:  Edited Result - FINAL   Visible to patient:  No (Not Released) Next appt:  2017 at 05:00 AM in Speech Therapy (Geraldine Harris, SLP) Order: [id number]     Details      Reading Physician Reading Date Result Priority     Haja Sheriff MD 12/3/2017          Narrative           [id number]  [id number]  [id number]  [id number]^MAY^TITO^DOV        [facility]  Echocardiography Laboratory  [facility] [place] [address]        Name: KEYANNA SEGOVIA  MRN: [id number]  : 1935  Study Date: 2017 10:31 AM  Age: 82 yrs  Gender: Female  Patient Location: [facility]  Reason For Study: Afib  Ordering Physician: TITO MCKEON  Referring Physician: TITO MCKEON  Performed By: Caity Mccarty RDCS     BSA: 2.0 m2  Height: 65 in  Weight: 217 lb  HR: 97  BP: 142/74 mmHg  _____________________________________________________________________________  Procedure  Complete Echo Adult.  _____________________________________________________________________________  Interpretation Summary     The rhythm was atrial flutter.  The left ventricle is normal in size.  Left ventricular systolic function is normal.  The visual ejection fraction is estimated at 55-60%.  Normal left ventricular wall motion  The left atrium is mildly dilated.  There is no comparison study available.[DB1.3]                Revision History        User Key Date/Time User Provider Type Action    > DB1.3 12/3/2017  4:35 PM Ruben Pink MD Physician Sign     DB1.2 12/3/2017  4:05 PM Ruben Pink MD Physician      DB1.1 12/3/2017  4:04 PM Ruben Pink MD Physician             Progress Notes by Tito Mckeon MD at 12/3/2017  2:27 PM     Author:  Tito Mckeon MD Service:  Cardiology Author Type:  Physician    Filed:  12/3/2017  2:44 PM Date of Service:  12/3/2017  2:27 PM Creation Time:  12/3/2017  2:27 PM    Status:  Signed :  Tito Mckeon MD (Physician)         [place]  [facility]    Cardiology Progress Note     Assessment & Plan   Daija Burgess is a 82 year old female who was admitted on 12/1/2017. Mrs Burgess has a longstanding history of Afib (on dronedarone chronically, was not on anticoagulation) who sustained a recent probable cardioembolic CVA.  Following her admission she developed atrial fibrillation with RVR.  Introducing beta-blockade for rate control. Long-term anticoagulation has been initiatied    Ventricular rates are still elevated.  Will increase beta-blockade dosing. Echo results are reassuring.  Will re-engaged dronedarone.    If rate control is unsucessful, will consider YAS guided cardioversion.    1.  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     2.  Paroxysmal atrial fibrillation, with current rapid ventricular rates, now on Eliquis. chronic Multaq use.     3.  Hyperlipidemia.   4.  Obesity.     5.  Major depressive disorder.     6.  History of colon cancer, status post colectomy.     - increasing metoprolol 50 mg BID  - restarting Multag  - continue Eliquis    Tito Hayes MD    Interval History   Patient remains asymptomatic.  No acute overnight events.    Physical Exam   Temp: 97.9  F (36.6  C) Temp src: Oral BP: 137/77   Heart Rate: 86 Resp: 16 SpO2: 94 % O2 Device: None (Room air)    Vitals:    12/01/17 1526 12/01/17 1718   Weight: 90.7 kg (200 lb) 98.7 kg (217 lb 8 oz)     Vital Signs with Ranges  Temp:  [97.4  F (36.3  C)-98.3  F (36.8  C)] 97.9  F (36.6  C)  Heart Rate:  [] 86  Resp:  [16-18] 16  BP: (113-152)/(58-95) 137/77  SpO2:  [94 %-96 %] 94 %  I/O last 3 completed shifts:  In: 1864 [P.O.:440; I.V.:1424]  Out: 2900 [Urine:2900]  Patient Active Problem List   Diagnosis     Left-sided cerebrovascular accident (CVA) (H)       PHYSICAL EXAMINATION:   GENERAL:  Well-appearing female, appears younger than stated age, obese, sensorium clear, speech forced and garbled on occasions.  Able to communicate freely, however.  Relaxed,  nondyspneic.   HEENT:  Right facial slurring otherwise no major abnormalities.   LUNGS:  Clear to auscultation bilaterally.  Acceptable air movement.   CARDIOVASCULAR:  Irregular rhythm, rapid.  Variable S1.  Positive systolic flow murmur, loudest at the cardiac base.  No other murmurs appreciated.   SKIN:  No precordial heaves.     VESSELS:  JVP is nonelevated.   EXTREMITIES:  Right lower extremity edema present (mild to moderate).  Trace left lower extremity edema.  Normal perfusion bilaterally.         Medications     - MEDICATION INSTRUCTIONS -       NaCl 50 mL/hr at 17 0813       docusate sodium (COLACE) capsule 100 mg  100 mg Oral Daily     escitalopram (LEXAPRO) tablet 5 mg  5 mg Oral Daily     pravastatin (PRAVACHOL) tablet 20 mg  20 mg Oral QPM     zonisamide (ZONEGRAN) capsule 100 mg  100 mg Oral At Bedtime     metoprolol  50 mg Oral BID     metoprolol  25 mg Oral Once     apixaban ANTICOAGULANT  5 mg Oral BID     sodium chloride (PF)  3 mL Intracatheter Q8H       Data[AM1.1]   Results for orders placed or performed during the hospital encounter of 17 (from the past 24 hour(s))   EKG 12-lead, tracing only   Result Value Ref Range    Interpretation ECG Click View Image link to view waveform and result    Glucose by meter   Result Value Ref Range    Glucose 81 70 - 99 mg/dL   Glucose by meter   Result Value Ref Range    Glucose 87 70 - 99 mg/dL   Echocardiogram Complete    Narrative    [id number]  [facility]  [id number]  [id number]^MAY^TYREE^[facility]  Echocardiography Laboratory  [address] [place] [address]        Name: KEYANNA SEGOVIA  MRN: [id number]  : 1935  Study Date: 2017 10:31 AM  Age: 82 yrs  Gender: Female  Patient Location: [facility]  Reason For Study: Afib  Ordering Physician: TYREE MCKEON  Referring Physician: TYREE MCKEON  Performed By: Caity Mccarty RDCS     BSA: 2.0 m2  Height: 65 in  Weight: 217 lb  HR: 97  BP: 142/74  mmHg  _____________________________________________________________________________  __        Procedure  Complete Echo Adult.  _____________________________________________________________________________  __        Interpretation Summary     The rhythm was atrial flutter.  The left ventricle is normal in size.  Left ventricular systolic function is normal.  The visual ejection fraction is estimated at 55-60%.  Normal left ventricular wall motion  The left atrium is mildly dilated.  There is no comparison study available.  _____________________________________________________________________________  __        Left Ventricle  The left ventricle is normal in size. There is normal left ventricular wall  thickness. Left ventricular systolic function is normal. The visual ejection  fraction is estimated at 55-60%. Normal left ventricular wall motion.     Right Ventricle  The right ventricle is normal in structure, function and size.     Atria  The left atrium is mildly dilated. Right atrial size is normal. There is no  atrial shunt seen.     Mitral Valve  The mitral valve is normal in structure and function. There is trace mitral  regurgitation.        Tricuspid Valve  The tricuspid valve is normal in structure and function. There is trace  tricuspid regurgitation. Right ventricular systolic pressure could not be  approximated due to inadequate tricuspid regurgitation. Normal IVC (1.5-2.5cm)  with >50% respiratory collapse; right atrial pressure is estimated at 5-  10mmHg.     Aortic Valve  The aortic valve is normal in structure and function. No aortic regurgitation  is present. No aortic stenosis is present.     Pulmonic Valve  The pulmonic valve is not well seen, but is grossly normal. There is trace  pulmonic valvular regurgitation.     Vessels  Normal size aorta. The IVC is normal in size and reactivity with respiration,  suggesting normal central venous pressure.     Pericardium  There is no pericardial  effusion.        Rhythm  The rhythm was atrial flutter.  _____________________________________________________________________________  __  MMode/2D Measurements & Calculations  IVSd: 1.1 cm     LVIDd: 3.9 cm  LVIDs: 3.3 cm  LVPWd: 1.1 cm  FS: 16.5 %  EDV(Teich): 67.3 ml  ESV(Teich): 43.7 ml  LV mass(C)d: 138.8 grams  LV mass(C)dI: 67.8 grams/m2  Ao root diam: 3.2 cm  LA dimension: 3.4 cm  asc Aorta Diam: 3.4 cm  LA/Ao: 1.1  LVOT diam: 2.1 cm  LVOT area: 3.6 cm2  LA Volume (BP): 69.8 ml  LA Volume Index (BP): 34.0 ml/m2  RWT: 0.56           Doppler Measurements & Calculations  MV E max tucker: 90.9 cm/sec  MV dec time: 0.22 sec  E/E' avg: 10.8  Lateral E/e': 11.8  Medial E/e': 9.9           _____________________________________________________________________________  __           Report approved by: Rabia Urban 12/03/2017 12:03 PM          Recent Labs  Lab 12/02/17  0128 12/01/17  1903 12/01/17  1535   WBC  --   --  7.5   HGB  --   --  14.0   MCV  --   --  93   PLT  --   --  209   INR  --   --  0.98   NA  --   --  140   POTASSIUM  --   --  3.9   CHLORIDE  --   --  108   CO2  --   --  24   BUN  --   --  16   CR  --   --  0.94   ANIONGAP  --   --  8   FAINA  --   --  9.1   GLC  --   --  144*   TROPI <0.015 <0.015  --      No results found for this or any previous visit (from the past 24 hour(s)).[AM1.2]     Revision History        User Key Date/Time User Provider Type Action    > AM1.2 12/3/2017  2:44 PM Tito Hayes MD Physician Sign     AM1.1 12/3/2017  2:27 PM Tito Hayes MD Physician             Progress Notes by Yeni Oreilly, RN at 12/3/2017 11:12 AM     Author:  Yeni Oreilly, RN Service:  Care Coordinator Author Type:      Filed:  12/3/2017 11:33 AM Date of Service:  12/3/2017 11:12 AM Creation Time:  12/3/2017 11:12 AM    Status:  Addendum :  Yeni Oreilly RN ()         Price check for Eliquis:    Patient would qualify for 1 month free and then price  would be $60 per month.[SS1.1]    12/3/2017, 11:32 AM  Explained above coverage for Eliquis to patient and she is in agreement to starting Eliquis and paying the above price.[SS1.2]     Revision History        User Key Date/Time User Provider Type Action    > SS1.2 12/3/2017 11:33 AM Yeni Oreilly RN Case Manager Addend     SS1.1 12/3/2017 11:13 AM Yeni Oreilly RN Case Manager Sign            Progress Notes by Betsey Hill MD at 12/3/2017 10:57 AM     Author:  Betsey Hill MD Service:  Neurology Author Type:  Physician    Filed:  12/3/2017 11:03 AM Date of Service:  12/3/2017 10:57 AM Creation Time:  12/3/2017 10:57 AM    Status:  Signed :  Betsey Hill MD (Physician)         Neurology Progress Note          Assessment and Plan:     Left-sided cerebrovascular accident (CVA) (H)    Atrial fibrillation    Continue Eliquis. Continue rehabilitation effort. Agree with cardiology input. IVF can be discontinued after the ECHO if patient is having adequate po. Discussed with patient. At this point neurology will sign off. Call if questions.               Interval History:   Continues to have mild aphasia and dysarthria. Initiated apixaban, tolerating well. Cardiology consulted, Multaq held, now on low dose propranolol for rate control.              Review of Systems:   The 5 point Review of Systems is negative other than noted in the HPI             Medications:       docusate sodium (COLACE) capsule 100 mg  100 mg Oral Daily     escitalopram (LEXAPRO) tablet 5 mg  5 mg Oral Daily     pravastatin (PRAVACHOL) tablet 20 mg  20 mg Oral QPM     zonisamide (ZONEGRAN) capsule 100 mg  100 mg Oral At Bedtime     apixaban ANTICOAGULANT  5 mg Oral BID     metoprolol  25 mg Oral BID     sodium chloride (PF)  3 mL Intracatheter Q8H     naloxone, - MEDICATION INSTRUCTIONS -, acetaminophen, ondansetron **OR** ondansetron, labetalol, hydrALAZINE, metoprolol, sodium chloride (PF)                "Physical Exam:   Vitals were reviewed  Blood pressure (P) 113/79, pulse 66, temperature 98.3  F (36.8  C), temperature source Oral, resp. rate 16, height 1.651 m (5' 5\"), weight 98.7 kg (217 lb 8 oz), SpO2 94 %.  Wt Readings from Last 4 Encounters:   17 98.7 kg (217 lb 8 oz)     Temperatures:  Current - Temp: 98.3  F (36.8  C); Max - Temp  Av.9  F (36.6  C)  Min: 97.4  F (36.3  C)  Max: 98.3  F (36.8  C)  Blood pressure range: Systolic (24hrs), Av , Min:113 , Max:152   ; Diastolic (24hrs), Av, Min:58, Max:95    I/O last 3 completed shifts:  In: 1864 [P.O.:440; I.V.:1424]  Out: 2900 [Urine:2900]  Mental Status Exam:   Level of Alertness:   awake  Orientation:   normal to person, place, time  Memory:   normal (3/3)  Fund of Knowledge:  normal  Attention/Concentration:  normal  Language:  Slurred with word finding difficulty; naming, reading, repetition, commands are normal.  Cranial Nerves: Visual fields: full; EOM: intact; Pupils: PERRL; Fundi: No papilledema; V, VII: no facial asymmetry to sensory, very slight right facial weakness; tongue, palate: midline; shoulder and neck muscle strength: symmetric. Moderate dysarthria.  Motor Exam:  moves all extremities well and symmetrically.  Sensory:  sensory intact to light touch/vibration.  Coordination: finger/Nose:  right:  normal; left:  Normal; heel-Knee-Shin:  right:  normal; left:  normal; rapid Alternating Movements:  right:  normal; left:  normal.  Gait: independent, slightly waddling.  Deep Tendon Reflexes:  reflexes are hypoactive and symmetrical, except for a reduced left KJ compared to right; plantar response:  Right: flexor; left: flexor.                   Data:   All laboratory and imaging data reviewed by me:  Results for orders placed or performed during the hospital encounter of 17 (from the past 24 hour(s))   EKG 12-lead, tracing only   Result Value Ref Range    Interpretation ECG Click View Image link to view waveform and result "    Glucose by meter   Result Value Ref Range    Glucose 81 70 - 99 mg/dL   Glucose by meter   Result Value Ref Range    Glucose 87 70 - 99 mg/dL[SP1.1]          Revision History        User Key Date/Time User Provider Type Action    > SP1.1 12/3/2017 11:03 AM Betsey Hill MD Physician Sign            Progress Notes by Ruben Pink MD at 12/2/2017  5:08 PM     Author:  Ruben Pink MD Service:  Hospitalist Author Type:  Physician    Filed:  12/2/2017  8:24 PM Date of Service:  12/2/2017  5:08 PM Creation Time:  12/2/2017  7:18 PM    Status:  Signed :  Ruben Pink MD (Physician)         [facility]    Hospitalist Progress Note    Date of Service (when I saw the patient):[DB1.1] 12/02/2017    Assessment & Plan[DB1.2]    This is an 82-year-old female with past medical history of atrial fibrillation, on aspirin and Multaq, also vertigo, remote history of colon cancer and depression.  Presented 12/1 for evaluation of word finding difficulties, slurred speech, and right-sided weakness.      PLAN:   1.  Left frontal lobe CVA; stable and improving.  Suspect this is probably in the language center.   Her CHADS2-VASc score is above 2.   - Appreciate eval. By Neurology today.  - Cardiology consult re. A-fib; reviewed w/ on-call and note addition of metoprolol.  - continue monitoring on telemetry.  - appreciate SLP eval. Today; note diet has been advanced; resume PTA PO meds (except for ASA).    2.  Hypertension; pressures stable.  - continue PRN IV labetalol and hydralazine; will continue with permissive blood pressures up to 200 systolic and 100 diastolic.     3.  Atrial fibrillation, rate stable.  Reviewed w/ Dr. Tito Hayes today - appreciate his input.  - check EKG (done); continue telemetry; add metoprolol.  - hold off on Multaq for now; will need further f/u w/ Cardiology shortly after discharge (Daija will be staying in [place] for at least the next 1-2 weeks, before returning to  [place])  - PRN IV metoprolol available    4.  Depression; euthymic and cheerful when I see her.  - resume PTA escitalopram    5. Hyperglycemia with elevated A1C; may have pre-diabetes or mild DM.  - glucose testing Qshift; re-eval. 12/3.     DVT prophylaxis:  PCDs due to risk of possible hemorrhagic conversion.       CODE STATUS: Full Code      DISPOSITION:  Anticipate 2-3 days for possible workup and safe disposition planning.[DB1.3]      NICOLE Pink MD, FACP   Internal Medicine Hospitalist[DB1.1]        Interval History[DB1.2]   No new problems; still having some difficulty with word finding, but speech less slurred.  Was up walking at least once; no problems with balance.  Able to eat/feed herself without difficulty.  No headache or vertigo Sx at present, but asking to have her PRN zonisamide resumed.  Remainder of ROS negative.[DB1.3]    -Data reviewed today: I reviewed all new labs and imaging results over the last 24 hours. I personally reviewed[DB1.1] no images or EKG's today[DB1.3].[DB1.1]    Physical Exam   Temp: 97.7  F (36.5  C) Temp src: Oral BP: 141/58   Heart Rate: 121 Resp: 16 SpO2: 95 % O2 Device: None (Room air)    Vitals:    12/01/17 1526 12/01/17 1718   Weight: 90.7 kg (200 lb) 98.7 kg (217 lb 8 oz)[DB1.2]     Vital Signs with Ranges[DB1.1]  Temp:  [97.5  F (36.4  C)-98.2  F (36.8  C)] 97.7  F (36.5  C)  Heart Rate:  [] 121  Resp:  [16-18] 16  BP: (139-154)/(58-82) 141/58  SpO2:  [92 %-97 %] 95 %  I/O last 3 completed shifts:  In: 403 [I.V.:403]  Out: 1550 [Urine:1550][DB1.2]    Constitutional: No acute distress[DB1.1]; lying in bed.  HEENT: AT/NC; sclerae clear; EOMI. MM's moist.[DB1.3]  Respiratory: Good air entry bilaterally, no wheezing[DB1.1] or rhonchi[DB1.3]  Cardiovascular: S1, S2 present,[DB1.1] irr[DB1.3]egular rate and rhythm[DB1.1]; no[DB1.3] murmur, rubs or gallops  GI:[DB1.1] obese;[DB1.3] soft, positive bowel sounds, non-tender, non-distended  Skin/Integumen: no edema, no  cyanosis, no rashes[DB1.1]  Neurologic: awake, conversant, jovial; follows directions well. Speech sl. Slurred at times, and with some difficulty finding words.  Please see Neurology note from earlier today for exam at that time.[DB1.3]     Medications     - MEDICATION INSTRUCTIONS -       NaCl 50 mL/hr at 12/02/17 1824       apixaban ANTICOAGULANT  5 mg Oral BID     metoprolol  25 mg Oral BID     sodium chloride (PF)  3 mL Intracatheter Q8H       Data     Recent Labs  Lab 12/02/17  0128 12/01/17  1903 12/01/17  1535   WBC  --   --  7.5   HGB  --   --  14.0   MCV  --   --  93   PLT  --   --  209   INR  --   --  0.98   NA  --   --  140   POTASSIUM  --   --  3.9   CHLORIDE  --   --  108   CO2  --   --  24   BUN  --   --  16   CR  --   --  0.94   ANIONGAP  --   --  8   FAINA  --   --  9.1   GLC  --   --  144*   TROPI <0.015 <0.015  --        Recent Results (from the past 24 hour(s))   MRI Brain w & w/o contrast    Narrative    MR BRAIN W/O & W CONTRAST  12/1/2017 9:44 PM     HISTORY:  Acute CVI;     TECHNIQUE: Multiplanar, multisequence MRI of the brain without and  with 9mL gadavist IV contrast material.    COMPARISON: CT studies dated 12/1/2017.    FINDINGS:  Diffusion-weighted images reveal area of restricted  diffusion in the lateral cortex of the left posterior frontal lobe.  This area of restricted diffusion measures approximately 3 cm in  transverse dimension by 1 cm in AP dimension. This area of restricted  diffusion is consistent with an acute cortical infarct..  There is no  hemorrhage or mass effect.. There are no gadolinium enhancing lesions.   The facial structures appear normal.  The arteries at the base of the  brain and the dural venous sinuses appear patent.      Impression    IMPRESSION: Study is consistent with an acute infarct in the lateral  cortex of the left frontal lobe.     CYNTHIA NOGUERA MD   CT Head w/o Contrast    Narrative    CT SCAN OF THE HEAD WITHOUT CONTRAST   12/2/2017 9:55 AM     HISTORY:  Follow-up cerebrovascular incident.      TECHNIQUE: Axial images of the head and coronal reformations without  IV contrast material.  Radiation dose for this scan was reduced using  automated exposure control, adjustment of the mA and/or kV according  to patient size, or iterative reconstruction technique.    COMPARISON: MR dated 12/1/2017.    FINDINGS: The left posterolateral acute frontal ischemic infarct seen  on MR is not as readily appreciated on CT. There is no evidence for  any hemorrhagic transformation. Mild cerebral atrophy is present along  with some minimal nonspecific white matter changes. There is no  evidence for hydrocephalus, mass effect, or skull fracture.      Impression    IMPRESSION:  1. No evidence for hemorrhagic transformation of posterolateral left  frontal infarct.  2. Mild cerebral atrophy with minimal nonspecific white matter  changes.    SAI ESTRADA MD[DB1.2]        Revision History        User Key Date/Time User Provider Type Action    > DB1.3 12/2/2017  8:24 PM Ruben Pink MD Physician Sign     DB1.2 12/2/2017  7:19 PM Ruben Pink MD Physician      DB1.1 12/2/2017  7:18 PM Ruben Pink MD Physician             Progress Notes by Jeremie Castillo PT at 12/2/2017  4:31 PM     Author:  Jeremie Castillo PT Service:  (none) Author Type:  Physical Therapist    Filed:  12/2/2017  4:31 PM Date of Service:  12/2/2017  4:31 PM Creation Time:  12/2/2017  4:31 PM    Status:  Signed :  Jeremie Castillo PT (Physical Therapist)            12/02/17 1600   Living Environment   Lives With alone   Living Arrangements house   Home Accessibility bed and bath are not on the first floor;stairs to enter home;stairs within home   Number of Stairs to Enter Home 14   Number of Stairs Within Home 10   Transportation Available car;family or friend will provide   Self-Care   Usual Activity Tolerance good   Current Activity Tolerance good   Functional Level Prior   Ambulation 0-->independent    Transferring 0-->independent   Toileting 0-->independent   Bathing 0-->independent   Dressing 0-->independent   Prior Functional Level Comment At baseline, pt lives alone at home in [place]. Is planning to stay with daughter full time here in [place]. (possibly for 6 weeks)   General Information   Onset of Illness/Injury or Date of Surgery - Date 12/02/17   Patient/Family Goals Statement Return home   Pertinent History of Current Problem (include personal factors and/or comorbidities that impact the POC) 82 year old female admitted with aphasia and found to have a L hemispheric cardioembolic infarction.    Precautions/Limitations fall precautions   Cognitive Status Examination   Orientation orientation to person, place and time   Level of Consciousness alert   Follows Commands and Answers Questions 100% of the time;able to follow multistep instructions   Personal Safety and Judgment intact   Cognitive Comment (Noted impairments on the SLUMS performed with OT this AM. )   Range of Motion (ROM)   ROM Comment UE and LE AROM WFLs.    Strength   Strength Comments Sufficient for mobility with SBA.    Bed Mobility   Bed Mobility Comments Supine to/from sit with IND.    Transfer Skills   Transfer Comments Sit to/from stand with SBA   Gait   Gait Comments Ambulates 200' with independence, performs stairs with one rail and mod I   Balance   Balance Comments No dynamic or static balance deficits noted.    Sensory Examination   Sensory Perception Comments Denies burning, numbness and tingling   Coordination   Coordination no deficits were identified   Clinical Impression   Criteria for Skilled Therapeutic Intervention evaluation only;no problems identified which require skilled intervention  (To work with OT/SLP on current deficits)   Clinical Presentation Stable/Uncomplicated   Clinical Presentation Rationale Medically stable.    Clinical Decision Making (Complexity) Low complexity   Anticipated Discharge Disposition Home  "with Assist  (continued OT/SLP intervention)   Risk & Benefits of therapy have been explained Yes   Patient, Family & other staff in agreement with plan of care Yes   [facility] TM \"6 Clicks\"   2016, Trustees of [facility], under license to [employer].  All rights reserved.   6 Clicks Short Forms Basic Mobility Inpatient Short Form   [facility] AM-PAC  \"6 Clicks\" V.2 Basic Mobility Inpatient Short Form   1. Turning from your back to your side while in a flat bed without using bedrails? 4 - None   2. Moving from lying on your back to sitting on the side of a flat bed without using bedrails? 4 - None   3. Moving to and from a bed to a chair (including a wheelchair)? 4 - None   4. Standing up from a chair using your arms (e.g., wheelchair, or bedside chair)? 4 - None   5. To walk in hospital room? 4 - None   6. Climbing 3-5 steps with a railing? 4 - None   Basic Mobility Raw Score (Score out of 24.Lower scores equate to lower levels of function) 24   Total Evaluation Time   Total Evaluation Time (Minutes) 17[BB1.1]        Revision History        User Key Date/Time User Provider Type Action    > BB1.1 12/2/2017  4:31 PM Jeremie Castillo PT Physical Therapist Sign            Progress Notes by Lissett Velasquez SLP at 12/2/2017 10:33 AM     Author:  Lissett Velasquez SLP Service:  (none) Author Type:  Speech Language Pathologist    Filed:  12/2/2017 10:33 AM Date of Service:  12/2/2017 10:33 AM Creation Time:  12/2/2017 10:33 AM    Status:  Signed :  Lissett Velasquez SLP (Speech Language Pathologist)            12/02/17 1022   General Information   Onset Date 12/01/17   Start of Care Date 12/02/17   Referring Physician Jessee Medellin MD   Patient Profile Review/OT: Additional Occupational Profile Info See Profile for full history and prior level of function   Patient/Family Goals Statement Pt would like coffee   Swallowing Evaluation Bedside swallow evaluation   Behaviorial " Observations WFL (within functional limits)   Mode of current nutrition NPO   Respiratory Status Room air   Comments Daija Burgess is a 82 year old female with a medical history of vertigo and cancer who presents with aphasia. Patient was getting ready to get on a flight from [place] to [place]. As she began to leave her daughter's house around 6:30 AM this morning, her daughter noticed slurred and difficulty with speech, as well as the patient stating that she was having a difficult time speaking. Daughter suggested that she go to the hospital however the patient was not agreeable with that plan and continued to go to the airport. Pt continues to present with dysarthria and mild R facial droop. Bedside swallow eval completed per MD orders to further assess oropharyngeal swallow function.    Clinical Swallow Evaluation   Oral Musculature other (see comments)  (mild R sided droop)   Structural Abnormalities none present   Dentition present and adequate   Mucosal Quality adequate   Mandibular Strength and Mobility intact   Oral Labial Strength and Mobility WFL   Lingual Strength and Mobility WFL   Velar Elevation intact   Buccal Strength and Mobility intact   Laryngeal Function Cough;Throat clear;Swallow;Voicing initiated   Oral Musculature Comments dysarthria; mild R facial droop    Additional Documentation Yes   Clinical Swallow Eval: Thin Liquid Texture Trial   Mode of Presentation, Thin Liquids cup;self-fed   Volume of Liquid or Food Presented 4 oz   Oral Phase of Swallow WFL   Pharyngeal Phase of Swallow intact   Diagnostic Statement Pt tolerated thin liquids via cup with no overt s/sx of aspiration   Clinical Swallow Eval: Puree Solid Texture Trial   Mode of Presentation, Puree spoon;self-fed   Volume of Puree Presented 5 tbsp   Oral Phase, Puree WFL   Pharyngeal Phase, Puree intact   Diagnostic Statement Pt tolerated pureed textures via spoon with no overt s/sx of aspiration    Clinical Swallow Eval:  Solid Food Texture Trial   Mode of Presentation, Solid self-fed   Volume of Solid Food Presented 1 cracker   Oral Phase, Solid other (see comments)  (prolonged but functional time for mastication )   Pharyngeal Phase, Solid impaired;throat clearing   Diagnostic Statement Regular solid textures resulted in mildly prolonged but functional time for mastication; mild throat clearing noted x1. No other overt s/sx of aspiration on regular solid textures.    VFSS Evaluation   VFSS Additional Documentation No   FEES Evaluation   Additional Documentation No   Swallow Compensations   Swallow Compensations Alternate viscosity of consistencies;Effortful swallow;Pacing;Reduce amounts;Multiple swallow   Results Oral difficulties only   Esophageal Phase of Swallow   Patient reports or presents with symptoms of esophageal dysphagia No   General Therapy Interventions   Planned Therapy Interventions Dysphagia Treatment   Dysphagia treatment Compensatory strategies for swallowing;Instruction of safe swallow strategies   Swallow Eval: Clinical Impressions   Skilled Criteria for Therapy Intervention Skilled criteria met.  Treatment indicated.   Functional Assessment Scale (FAS) 5   Treatment Diagnosis mild oropharyngeal dysphagia   Diet texture recommendations Thin liquids;Regular diet  (self-select softer textures)   Recommended Feeding/Eating Techniques alternate between small bites and sips of food/liquid;check mouth frequently for oral residue/pocketing;hard swallow w/ each bite or sip;maintain upright posture during/after eating for 30 mins;small sips/bites   Demonstrates Need for Referral to Another Service occupational therapy;physical therapy   Therapy Frequency 5 times/wk   Predicted Duration of Therapy Intervention (days/wks) 1 week   Anticipated Discharge Disposition other (see comments)  (defer pending PT/OT)   Risks and Benefits of Treatment have been explained. Yes   Patient, family and/or staff in agreement with Plan of  Care Yes   Clinical Impression Comments SLP: Bedside swallow eval completed per MD orders. Pt presents with mild oropharyngeal dysphagia and moderate dysarthria. Pt noted to have mild R facial droop. Pt tolerated thin liquids via cup and pureed textures via spoon with no overt s/sx of aspiration. Regular solid textures resulted in mildly prolonged but functional time for mastication; mild throat clear noted x1 however no other overt s/sx of aspiration. Recommend pt initiate regular textures (self-select soft foods) and thin liquids. Pt should be fully upright for all PO, take small single sips/bites, pace self, and alternate between consistencies. ST to continue to follow for diet tolerance and completion of s/l eval pending d/c plans. Pt will require ongoing ST upon d/c to next level of care.    Total Evaluation Time   Total Evaluation Time (Minutes) 10[LL1.1]        Revision History        User Key Date/Time User Provider Type Action    > LL1.1 12/2/2017 10:33 AM Lissett Velasquez, SLP Speech Language Pathologist Sign            Progress Notes by Ana María Romero OT at 12/2/2017 10:22 AM     Author:  Ana María Romero OT Service:  Acute IP Rehab Author Type:  Occupational Therapist    Filed:  12/2/2017 10:22 AM Date of Service:  12/2/2017 10:22 AM Creation Time:  12/2/2017 10:22 AM    Status:  Signed :  Ana María Romero OT (Occupational Therapist)          12/02/17 0900   Quick Adds   Type of Visit Initial Occupational Therapy Evaluation   Living Environment   Lives With alone  (here visiting her daughter for her granddaughters play)   Living Arrangements house   Home Accessibility bed and bath are not on the first floor;stairs to enter home;stairs within home   Number of Stairs to Enter Home 14   Number of Stairs Within Home 10   Stair Railings at Home outside, present on left side;inside, present on left side   Transportation Available car;family or friend will provide   Self-Care   Usual  Activity Tolerance excellent   Regular Exercise no   Equipment Currently Used at Home none   Activity/Exercise/Self-Care Comment Pt was I with ADL's/IADls including med mgmt, driving, shoveling, mowing   Functional Level Prior   Fall history within last six months no   Which of the above functional risks had a recent onset or change? none   General Information   Onset of Illness/Injury or Date of Surgery - Date 12/01/17   Referring Physician Vignesh   Patient/Family Goals Statement maximize I and return to [facility]   Additional Occupational Profile Info/Pertinent History of Current Problem pt traveled here from [facility] to see her granddaughters play for the weekend, staying with daughter. pt had R hand weakness, slurred speech but c ontinued on flight. once here facial droop worse and word finding difficulty. MRI shows acute infarct in Lateral cortex of Left frontal lobe.    Precautions/Limitations fall precautions   General Observations pt agreeable to OT eval, daughters present at beginning of eval, but then had to leave.    Cognitive Status Examination   Orientation orientation to person, place and time   Level of Consciousness alert   Able to Follow Commands WNL/WFL   Personal Safety (Cognitive) WNL/WFL   Memory impaired   Attention No deficits were identified   Cognitive Comment scored 22/30 on SBT   Visual Perception   Visual Perception Comments will continue to assess   Sensory Examination   Sensory Quick Adds No deficits were identified   Pain Assessment   Patient Currently in Pain No   Integumentary/Edema   Integumentary/Edema no deficits were identifed   Posture   Posture not impaired   Range of Motion (ROM)   ROM Quick Adds No deficits were identified   Strength   Manual Muscle Testing Quick Adds No deficits were identified   Hand Strength   Hand Strength Comments B Hands WFL   Muscle Tone Assessment   Muscle Tone Quick Adds No deficits were identified   Coordination   Upper Extremity Coordination No  deficits were identified   Coordination Comments will continue to assess   Mobility   Bed Mobility Comments SBA with all mobility   Transfer Skill: Sit to Stand   Level of [place]: Sit/Stand stand-by assist   Physical Assist/Nonphysical Assist: Sit/Stand supervision   Transfer Skill: Sit to Stand full weight-bearing   Transfer Skill: Toilet Transfer   Level of [place]: Toilet stand-by assist   Physical Assist/Nonphysical Assist: Toilet supervision   Balance   Balance Comments not assessed fully as patient had to leave for [place]   Lower Body Dressing   Level of [place]: Dress Lower Body independent   Grooming   Level of [place]: Grooming stand-by assist   Instrumental Activities of Daily Living (IADL)   Previous Responsibilities meal prep;housekeeping;laundry;shopping;yardwork;medication management;finances;driving   Activities of Daily Living Analysis   Impairments Contributing to Impaired Activities of Daily Living balance impaired;cognition impaired   General Therapy Interventions   Planned Therapy Interventions ADL retraining;IADL retraining;balance training;cognition;fine motor coordination training;neuromuscular re-education;visual perception;transfer training;progressive activity/exercise;risk factor education;home program guidelines   Clinical Impression   Criteria for Skilled Therapeutic Interventions Met yes, treatment indicated   OT Diagnosis decreased I with ADL's and cognition   Influenced by the following impairments decreased cog, possibly decreased vision, decreased balance   Assessment of Occupational Performance 3-5 Performance Deficits   Identified Performance Deficits social skills, home mgmt, ADL's   Clinical Decision Making (Complexity) Moderate complexity   Therapy Frequency daily   Predicted Duration of Therapy Intervention (days/wks) 5 days   Anticipated Discharge Disposition Home with Home Therapy   Risks and Benefits of Treatment have been explained. Yes   Patient, Family &  "other staff in agreement with plan of care Yes   [facility]-PAC TM \"6 Clicks\"   2016, Trustees of [facility], under license to [employer]  All rights reserved.   6 Clicks Short Forms Daily Activity Inpatient Short Form   [facility] AM-PAC  \"6 Clicks\" Daily Activity Inpatient Short Form   1. Putting on and taking off regular lower body clothing? 4 - None   2. Bathing (including washing, rinsing, drying)? 3 - A Little   3. Toileting, which includes using toilet, bedpan or urinal? 3 - A Little   4. Putting on and taking off regular upper body clothing? 4 - None   5. Taking care of personal grooming such as brushing teeth? 4 - None   6. Eating meals? 4 - None   Daily Activity Raw Score (Score out of 24.Lower scores equate to lower levels of function) 22   Total Evaluation Time   Total Evaluation Time (Minutes) 15[CV1.1]        Revision History        User Key Date/Time User Provider Type Action    > CV1.1 12/2/2017 10:22 AM Ana María Romero OT Occupational Therapist Sign            ED Provider Notes by Bay Sylvester MD at 12/1/2017  3:22 PM     Author:  Bay Sylvester MD Service:  Emergency Medicine Author Type:  Physician    Filed:  12/1/2017  8:56 PM Date of Service:  12/1/2017  3:22 PM Creation Time:  12/1/2017  3:40 PM    Status:  Signed :  Bay Sylvester MD (Physician)           History     Chief Complaint:  Aphasia    HPI   Daija Burgess is a 82 year old female[TD1.1] with a medical history of vertigo and cancer[TD1.2] who presents with aphasia.[TD1.1] Patient was getting ready to get on a flight from [place] to [place]. As she began to leave her daughter's house around 6:30 AM this morning, her daughter noticed slurred and difficulty with speech, as well as the patient stating that she was having a difficult time speaking. Daughter suggested that she go to the hospital however the patient was not agreeable with that plan and continued to go to the airport. " "Patient also grabbed a pen and paper to write but was having difficulty writing. During the plane ride, patient was not talkative but did tell the daughter that she was experiencing some right hand weakness per daughter. The patient took an aspirin this morning, and 2 of the low dose aspirins during the plane ride. By the time of arrival to [place], patient agreed to go to the hospital for further evaluation. Patient does not have a history of stroke.[TD1.2]    Allergies:[TD1.1]  No known drug allergies[TD1.2]     Medications:    Multaq  Aspirin[TD1.1]  Pravastatin sodium  Thera-vit  Colace  Escitalopram oxalate  Zonisamide[TD1.2]    Past Medical History:[TD1.1]    Cancer  Depressive disorder  Vertigo[TD1.2]    Past Surgical History:[TD1.1]    Cholecystectomy  Colonoscopy  Orthopedic surgery[TD1.2]    Family History:[TD1.1]    History reviewed. No pertinent family history.[TD1.2]      Social History:[TD1.1]  Smoking status: Never smoker  Alcohol use: None[TD1.2]      Review of Systems   Neurological: Positive for[TD1.1] speech difficulty[TD1.2] and[TD1.1] weakness[TD1.2].[TD1.1]   All other systems reviewed and are negative[TD1.2].    Physical Exam[TD1.1]   Patient Vitals for the past 24 hrs:   BP Temp Temp src Pulse Heart Rate Resp SpO2 Height Weight   12/01/17 1719 - - - - - - - 1.651 m (5' 5\") -   12/01/17 1718 166/79 97.6  F (36.4  C) Oral 66 - 20 97 % - 98.7 kg (217 lb 8 oz)   12/01/17 1645 158/76 - - - 68 21 94 % - -   12/01/17 1630 150/73 - - - 67 18 95 % - -   12/01/17 1615 170/77 - - - 73 12 95 % - -   12/01/17 1526 167/74 98.2  F (36.8  C) Oral - 85 - 99 % 1.651 m (5' 5\") 90.7 kg (200 lb)[TD1.3]      Physical Exam[TD1.1]  GENERAL: well developed, pleasant  HEAD: atraumatic  EYES: pupils reactive, extraocular muscles intact, conjunctivae normal  ENT:  mucus membranes moist  NECK:  trachea midline, normal range of motion  RESPIRATORY: no tachypnea, breath sounds clear to auscultation   CVS: normal S1/S2, " no murmurs, intact distal pulses  ABDOMEN: soft, nontender, nondistention  MUSCULOSKELETAL: no deformities  SKIN: warm and dry, no acute rashes or ulceration  NEURO:[TD1.2] Weakness to the nasal fold to the right. Thickened speech and difficult to understand. Normal finger to nose. Normal leg raise.[TD1.3]  PSYCH:  Mood/affect normal[TD1.2]    Emergency Department Course   Imaging:[TD1.1]  Radiographic findings were communicated with the patient and family who voiced understanding of the findings.  CT Head w Contrast  Negative CT brain perfusion imaging.  As read by Radiology.     CTA Angiogram Head Neck[TD1.2]  1. No significant stenosis is seen at either carotid bifurcation.  2. No arterial dissection is seen.  3. No occluded intracranial vessels or high-grade intracranial  vascular stenosis is identified.    I agree with the preliminary report that was communicated to the  referring physician.[TD1.4]   As read by Radiology.    CT Head w/o Contrast  Chronic changes. No evidence for intracranial hemorrhage  or any acute process.  As read by Radiology.[TD1.2]     Laboratory:[TD1.1]  BMP: Glucose 144 (H), GFR estimate 57 (L) WNL (Creatinine 0.94)   CBC: WNL (WBC 7.5, HGB 14.0, )    INR: 0.98  PTT: 24[TD1.2]    Emergency Department Course:[TD1.1]  Past medical records, nursing notes, and vitals reviewed.  1533: I performed an exam of the patient and obtained history, as documented above.   1535: IV inserted and blood drawn.   1538: Code stroke was called.   The patient was sent for a CT head and CTA angiogram while in the emergency department, findings above.   1549: I spoke with[TD1.2] Dr. Moncada, neurology[TD1.5] and discussed the patient.  1609: I spoke with radiology and discussed the patient.   1610: I rechecked the patient and explained the findings to the patient and the patient's daughters.[TD1.2]   1653: I spoke with Vignesh and discussed the patient.   Findings and plan explained to the Patient and  bryon who consents to admission.     Discussed the patient with Dr. Medellin, who will admit the patient to a medical tele bed for further monitoring, evaluation, and treatment.[TD1.4]      Impression & Plan    CMS Diagnoses: The patient has stroke symptoms:           ED Stroke specific documentation           NIHSS PDF          Protocol PDF     Patient last known well time:[TD1.1] 0630[TD1.2]  ED Provider first to bedside at:[TD1.1] 1533[TD1.2]  CT Results received at:[TD1.1] 1608[TD1.2]  Patient was not treated with TPA due to the following reason(s):[TD1.4]  Out of the treatment time window[SA1.1]      National Institutes of Health Stroke Scale (Baseline)  Time Performed:[TD1.1] 1534[TD1.4]      Score    Level of consciousness:[TD1.1] (0)   Alert, keenly responsive[SA1.1]    LOC questions:[TD1.1] (0)   Answers both questions correctly[SA1.1]    LOC commands:[TD1.1] (0)   Performs both tasks correctly[SA1.1]    Best gaze:[TD1.1] (0)   Normal[SA1.1]    Visual:[TD1.1] (0)   No visual loss[SA1.1]    Facial palsy:[TD1.1] (1)   Minor paralysis (flat nasolabial fold, smile asymmetry)[SA1.1]    Motor arm (left):[TD1.1] (0)   No drift[SA1.1]    Motor arm (right):[TD1.1] (0)   No drift[SA1.1]    Motor leg (left):[TD1.1] (0)   No drift[SA1.1]    Motor leg (right):[TD1.1] (0)   No drift[SA1.1]    Limb ataxia:[TD1.1] (0)   Absent[SA1.1]    Sensory:[TD1.1] (0)   Normal- no sensory loss[SA1.1]    Best language:[TD1.1] (0)   Normal- no aphasia[SA1.1]    Dysarthria:[TD1.1] (2)   Severe dysaphria[SA1.1]    Extinction and inattention:[TD1.1] (0)   No abnormality[SA1.1]        Total Score:[TD1.1]  3[SA1.1]        Stroke Mimics were considered (including migraine headache, seizure disorder, hypoglycemia (or hyperglycemia), head or spinal trauma, CNS infection, Toxin ingestion and shock state (e.g. sepsis) .    Medical Decision Making:[TD1.1]  Daija Burgess is a 82 year old female who presents with speech deficits starting this  morning at 6:30. The patient still has pronounced speech difficulties with slight weakness to the right[TD1.5] naso[SA1.1]labial fold. CT, CTA was obtained showing no evidence of stroke or large vessel disease. Patient given rectal aspirin. Family was a bit confused about the whole stroke work-up and treatment inquiring[TD1.5] if[SA1.1] she[TD1.5] needs to[SA1.1] be admitted to get an MRI. I discussed with them that there is more to it than just an MRI in terms of stroke management. I spoke with Dr. Moncada from neurology and Dr. Medellin form the hospital group.   Cr[TD1.5]itical Care time:[TD1.1]  none[SA1.1]    Diagnosis:[TD1.1]    ICD-10-CM   1. Cerebrovascular accident (CVA), unspecified mechanism (H) I63.9[TD1.6]     Disposition:[TD1.1]  Admitted to medicine[TD1.4]    Josefina Olguin  12/1/2017    EMERGENCY DEPARTMENT[TD1.1]  I, Josefina Olguin, am serving as a scribe at 3:33 PM on 12/1/2017 to document services personally performed by Bay Sylvester MD based on my observations and the provider's statements to me.[TD1.2]       Bay Sylvester MD  12/01/17 2056  [SA1.2]     Revision History        User Key Date/Time User Provider Type Action    > SA1.2 12/1/2017  8:56 PM Bay Sylvester MD Physician Sign     SA1.1 12/1/2017  8:53 PM Bay Sylvester MD Physician      TD1.5 12/1/2017  6:40 PM Do, Josefina Scribe Share     TD1.3 12/1/2017  6:25 PM Do, Josefina Scribe Share     TD1.6 12/1/2017  5:26 PM Do, Josefina Scribe Share     TD1.4 12/1/2017  4:47 PM Do, Josefina Scribe      TD1.2 12/1/2017  4:38 PM Do, Josefina Scribe Share     TD1.1 12/1/2017  3:46 PM Do, Josefina Scribe Share            Progress Notes by Chris Rodas RN at 12/1/2017  7:00 PM     Author:  Chris Rodas RN Service:  (none) Author Type:  Registered Nurse    Filed:  12/1/2017  7:40 PM Date of Service:  12/1/2017  7:00 PM Creation Time:  12/1/2017  7:38 PM    Status:  Signed :  Chris Rodas RN (Registered Nurse)         Pt A&O, VSS. garbled speech with difficult speaking.  "Neuros- PERRLA-intact. NPO settled and handed over to the next shift. Continue to monitor.[BN1.1]     Revision History        User Key Date/Time User Provider Type Action    > BN1.1 12/1/2017  7:40 PM Chris Rodas, RN Registered Nurse Sign            ED Notes by Daija Kolb RN at 12/1/2017  4:14 PM     Author:  Daija Kolb RN Service:  (none) Author Type:  Registered Nurse    Filed:  12/1/2017  4:58 PM Date of Service:  12/1/2017  4:14 PM Creation Time:  12/1/2017  4:20 PM    Status:  Addendum :  Daija Kolb RN (Registered Nurse)         [facility]  ED Nurse Handoff Report    ED Chief complaint:[KA1.1] Aphasia (expressive aphasia started at 8am, got on flight from [place] to [place] and during flight right hand became weak then strength returned. Now expressive aphasia)[KA1.2]      ED Diagnosis:[KA1.1]   Final diagnoses:   None[KA1.2]       Code Status: Full Code    Allergies:[KA1.1] No Known Allergies[KA1.2]    Activity level - Baseline/Home:  Independent    Activity Level - Current:   Stand with Assist     Needed?: No    Isolation: No  Infection: Not Applicable    Bariatric?: No    Vital Signs:[KA1.1]   Vitals:    12/01/17 1526   BP: 167/74   Temp: 98.2  F (36.8  C)   TempSrc: Oral   SpO2: 99%   Weight: 90.7 kg (200 lb)   Height: 1.651 m (5' 5\")[KA1.2]       Cardiac Rhythm: ,        Pain level:      Is this patient confused?: No    Patient Report: Initial Complaint: Patient presents to ED straight from airport with daughters. Patients daughter that lives near her in [place] and patient stayed overnight with her and woke up this morning and told her daughter that she was having trouble getting words out. Daughter wanted to take her to ED in [facility] but patient demanded that they get on plane to come here to see other daughter. During flight, daughter states that patient said that her R hand was very weak and she couldn't hold onto anything. This subsided but daughter " states that her speaking has been getting worse as the day went on.   Focused Assessment: Patient is aphasic on arrival to ED, garbled speech and R sided facial droop. All extremities are equal in strength. patient is on blood thinner for afib. Slight headache but no other pain.   Tests Performed: Labs, Head CT/CTA  Abnormal Results: Labs WNL, CT pending   Treatments provided:[KA1.1] Patient took a 325mg aspirin and 2 81mg aspirin PTA[TB1.1]    Family Comments: Daughters at bedside     OBS brochure/video discussed/provided to patient: N/A    ED Medications:[KA1.1]   Medications   Saline Flush - CT (100 mLs Intravenous Given 12/1/17 1600)   iopamidol (ISOVUE-370) solution 120 mL (120 mLs Intravenous Given 12/1/17 1600)[KA1.2]       Drips infusing?:  No      ED NURSE PHONE NUMBER: [phone][KA1.1]            Revision History        User Key Date/Time User Provider Type Action    > TB1.1 12/1/2017  4:58 PM Daija Kolb RN Registered Nurse Addend     KA1.2 12/1/2017  4:20 PM Lily Hess RN Registered Nurse Sign     KA1.1 12/1/2017  4:14 PM Lily Hess RN Registered Nurse             ED Notes by Liza Ngo RN at 12/1/2017  3:24 PM     Author:  Liza Ngo RN Service:  (none) Author Type:  Registered Nurse    Filed:  12/1/2017  3:24 PM Date of Service:  12/1/2017  3:24 PM Creation Time:  12/1/2017  3:24 PM    Status:  Signed :  Liza Ngo RN (Registered Nurse)         Bed: ED29  Expected date:   Expected time:   Means of arrival:   Comments:  triage     Revision History        User Key Date/Time User Provider Type Action    > JS1.1 12/1/2017  3:24 PM Liza Ngo RN Registered Nurse Sign                  Procedure Notes     No notes of this type exist for this encounter.         Progress Notes - Therapies (Notes from 12/01/17 through 12/04/17)      Progress Notes by Geraldine Harris SLP at 12/4/2017 12:49 PM     Author:  Geraldine Harris SLP Service:  (none) Author  Type:  Speech Language    Filed:  12/4/2017 12:49 PM Date of Service:  12/4/2017 12:49 PM Creation Time:  12/4/2017 12:49 PM    Status:  Signed :  Geraldine Harris, SLP (Speech Language)            12/04/17 1042   General Information, SLP   Type of Evaluation Speech and Language;Dysarthria   Type of Visit Initial   Start of Care Date 12/04/17   Onset of Illness/Injury or Date of Surgery - Date 12/01/17   Referring Physician Dr. Medellin   Patient/Family Goals Statement Patient wants to go home.    Pertinent History of Current Problem This is an 82-year-old female with past medical history of atrial fibrillation, on aspirin and Multaq, also vertigo, remote history of colon cancer and depression.  She began having neurological deficits at 6:30 a.m. to include word finding difficulties, had progressed to full slurred speech, some right-sided weakness which seems to be resolving but speech deficits are worsening.  She is being admitted for further workup and treatment for suspected left hemispheric stroke.  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    Precautions/Limitations fall precautions;sternal precautions   General Observations Pleasant and cooperative.    Oral Motor Sensory Function   Completed on Swallow Evaluation Completed Clinical Bedside Swallow Evaluation   Speech   Deficits in Speech Respiration Abdominal   Deficits in Phonation Loudness (soft)   Deficits in Articulation Flaccid dysarthria  (Mixed dysarthria)   Deficits in Resonance None   Deficits in [place]ody Disordered intonation patterns   Speech Comments Mild to moderate dysarthria characterized by articulatory  imprecision and slow rate.    Language: Auditory Comprehension (understanding of spoken language)   Tests were administered at the following levels Complex (vocation/community/social activities)   Paragraph; Discourse Comprehension Test (out of 8 total; less than 7 is below mean) 5   Functional  Assessment Scale (Auditory Comprehension) Mild Impairment   Comments (Auditory Comprehension) Length and complexity comprehension decreases.    Language: Verbal Expression (use of spoken language to express information)   Tests were administered at the following levels Complex (vocation/community/social activities)   Generative Naming; Cognitive Linguistic Quick Test Result Below mean   Functional Assessment Scale (Verbal Expression) Minimal Impairment   Comments (Verbal Expression) Slightly below the mean for her age. No apparent word finding noted in conversation.    Reading Comprehension (understanding of written language)   Comments (Reading Comprehension) Did not complete due to HA with vestibular issues when reading.    Cognitive Status Examination   Attention intact   Behavioral Observations alert   Reasoning intact  (At the basic to moderate level. )   Organization (Able to verbally sequence 4 step task. )   General Therapy Interventions   Planned Therapy Interventions Communication;Language   Language Auditory comprehension   Communication Improve speech intelligibility   Clinical Impression, SLP Eval   Criteria for Skilled Therapeutic Interventions Met Yes   SLP Diagnosis Mild to moderate dysarthria.    Functional limitations due to impairments Decrease speech intelligibility.   Rehab Potential Good, to achieve stated therapy goals   Therapy Frequency Daily   Predicted Duration of Therapy Intervention (days/wks) 3 days   Anticipated Discharge Disposition Home with Outpatient Therapy   Risks and Benefits of Treatment have been explained. Yes   Patient, Family & other staff in agreement with plan of care Yes   Clinical Impression Comments Patient presents with minimal to mild language deficits for auditory comprehension at the complex level and verbal language for word fluency. Mild to moderate dysarthria characterized by articulatory imprecision and slow rate.    Total Evaluation Time      Total Evaluation  Time (Minutes) 20[SM1.1]        Revision History        User Key Date/Time User Provider Type Action    > SM1.1 12/4/2017 12:49 PM Geraldine Harris, SLP Speech Language Sign            Progress Notes by Jeremie Castillo PT at 12/2/2017  4:31 PM     Author:  Jeremie Castillo PT Service:  (none) Author Type:  Physical Therapist    Filed:  12/2/2017  4:31 PM Date of Service:  12/2/2017  4:31 PM Creation Time:  12/2/2017  4:31 PM    Status:  Signed :  Jeremie Castillo PT (Physical Therapist)            12/02/17 1600   Living Environment   Lives With alone   Living Arrangements house   Home Accessibility bed and bath are not on the first floor;stairs to enter home;stairs within home   Number of Stairs to Enter Home 14   Number of Stairs Within Home 10   Transportation Available car;family or friend will provide   Self-Care   Usual Activity Tolerance good   Current Activity Tolerance good   Functional Level Prior   Ambulation 0-->independent   Transferring 0-->independent   Toileting 0-->independent   Bathing 0-->independent   Dressing 0-->independent   Prior Functional Level Comment At baseline, pt lives alone at home in [place]. Is planning to stay with daughter full time here in [place]. (possibly for 6 weeks)   General Information   Onset of Illness/Injury or Date of Surgery - Date 12/02/17   Patient/Family Goals Statement Return home   Pertinent History of Current Problem (include personal factors and/or comorbidities that impact the POC) 82 year old female admitted with aphasia and found to have a L hemispheric cardioembolic infarction.    Precautions/Limitations fall precautions   Cognitive Status Examination   Orientation orientation to person, place and time   Level of Consciousness alert   Follows Commands and Answers Questions 100% of the time;able to follow multistep instructions   Personal Safety and Judgment intact   Cognitive Comment (Noted impairments on the SLUMS performed with OT this AM. )   Range  "of Motion (ROM)   ROM Comment UE and LE AROM WFLs.    Strength   Strength Comments Sufficient for mobility with SBA.    Bed Mobility   Bed Mobility Comments Supine to/from sit with IND.    Transfer Skills   Transfer Comments Sit to/from stand with SBA   Gait   Gait Comments Ambulates 200' with independence, performs stairs with one rail and mod I   Balance   Balance Comments No dynamic or static balance deficits noted.    Sensory Examination   Sensory Perception Comments Denies burning, numbness and tingling   Coordination   Coordination no deficits were identified   Clinical Impression   Criteria for Skilled Therapeutic Intervention evaluation only;no problems identified which require skilled intervention  (To work with OT/SLP on current deficits)   Clinical Presentation Stable/Uncomplicated   Clinical Presentation Rationale Medically stable.    Clinical Decision Making (Complexity) Low complexity   Anticipated Discharge Disposition Home with Assist  (continued OT/SLP intervention)   Risk & Benefits of therapy have been explained Yes   Patient, Family & other staff in agreement with plan of care Yes   [facility] TM \"6 Clicks\"   2016, Trustees of [facility], under license to [employer].  All rights reserved.   6 Clicks Short Forms Basic Mobility Inpatient Short Form   [facility]  \"6 Clicks\" V.2 Basic Mobility Inpatient Short Form   1. Turning from your back to your side while in a flat bed without using bedrails? 4 - None   2. Moving from lying on your back to sitting on the side of a flat bed without using bedrails? 4 - None   3. Moving to and from a bed to a chair (including a wheelchair)? 4 - None   4. Standing up from a chair using your arms (e.g., wheelchair, or bedside chair)? 4 - None   5. To walk in hospital room? 4 - None   6. Climbing 3-5 steps with a railing? 4 - None   Basic Mobility Raw Score (Score out of 24.Lower scores equate to lower levels of function) 24 "   Total Evaluation Time   Total Evaluation Time (Minutes) 17[BB1.1]        Revision History        User Key Date/Time User Provider Type Action    > BB1.1 12/2/2017  4:31 PM Jeremie Castillo PT Physical Therapist Sign            Progress Notes by Ana María Romero OT at 12/2/2017 10:22 AM     Author:  Ana María Romero OT Service:  Acute IP Rehab Author Type:  Occupational Therapist    Filed:  12/2/2017 10:22 AM Date of Service:  12/2/2017 10:22 AM Creation Time:  12/2/2017 10:22 AM    Status:  Signed :  Ana María Romero OT (Occupational Therapist)          12/02/17 0900   Quick Adds   Type of Visit Initial Occupational Therapy Evaluation   Living Environment   Lives With alone  (here visiting her daughter for her granddaughters play)   Living Arrangements house   Home Accessibility bed and bath are not on the first floor;stairs to enter home;stairs within home   Number of Stairs to Enter Home 14   Number of Stairs Within Home 10   Stair Railings at Home outside, present on left side;inside, present on left side   Transportation Available car;family or friend will provide   Self-Care   Usual Activity Tolerance excellent   Regular Exercise no   Equipment Currently Used at Home none   Activity/Exercise/Self-Care Comment Pt was I with ADL's/IADls including med mgmt, driving, shoveling, mowing   Functional Level Prior   Fall history within last six months no   Which of the above functional risks had a recent onset or change? none   General Information   Onset of Illness/Injury or Date of Surgery - Date 12/01/17   Referring Physician Vignesh   Patient/Family Goals Statement maximize I and return to PLOF   Additional Occupational Profile Info/Pertinent History of Current Problem pt traveled here from [facility] to see her granddaughters play for the weekend, staying with daughter. pt had R hand weakness, slurred speech but c ontinued on flight. once here facial droop worse and word finding  difficulty. MRI shows acute infarct in Lateral cortex of Left frontal lobe.    Precautions/Limitations fall precautions   General Observations pt agreeable to OT eval, daughters present at beginning of eval, but then had to leave.    Cognitive Status Examination   Orientation orientation to person, place and time   Level of Consciousness alert   Able to Follow Commands WNL/WFL   Personal Safety (Cognitive) WNL/WFL   Memory impaired   Attention No deficits were identified   Cognitive Comment scored 22/30 on SBT   Visual Perception   Visual Perception Comments will continue to assess   Sensory Examination   Sensory Quick Adds No deficits were identified   Pain Assessment   Patient Currently in Pain No   Integumentary/Edema   Integumentary/Edema no deficits were identifed   Posture   Posture not impaired   Range of Motion (ROM)   ROM Quick Adds No deficits were identified   Strength   Manual Muscle Testing Quick Adds No deficits were identified   Hand Strength   Hand Strength Comments B Hands WFL   Muscle Tone Assessment   Muscle Tone Quick Adds No deficits were identified   Coordination   Upper Extremity Coordination No deficits were identified   Coordination Comments will continue to assess   Mobility   Bed Mobility Comments SBA with all mobility   Transfer Skill: Sit to Stand   Level of [place]: Sit/Stand stand-by assist   Physical Assist/Nonphysical Assist: Sit/Stand supervision   Transfer Skill: Sit to Stand full weight-bearing   Transfer Skill: Toilet Transfer   Level of [place]: Toilet stand-by assist   Physical Assist/Nonphysical Assist: Toilet supervision   Balance   Balance Comments not assessed fully as patient had to leave for [place]   Lower Body Dressing   Level of [place]: Dress Lower Body independent   Grooming   Level of [place]: Grooming stand-by assist   Instrumental Activities of Daily Living (IADL)   Previous Responsibilities meal  "prep;housekeeping;laundry;shopping;yardwork;medication management;finances;driving   Activities of Daily Living Analysis   Impairments Contributing to Impaired Activities of Daily Living balance impaired;cognition impaired   General Therapy Interventions   Planned Therapy Interventions ADL retraining;IADL retraining;balance training;cognition;fine motor coordination training;neuromuscular re-education;visual perception;transfer training;progressive activity/exercise;risk factor education;home program guidelines   Clinical Impression   Criteria for Skilled Therapeutic Interventions Met yes, treatment indicated   OT Diagnosis decreased I with ADL's and cognition   Influenced by the following impairments decreased cog, possibly decreased vision, decreased balance   Assessment of Occupational Performance 3-5 Performance Deficits   Identified Performance Deficits social skills, home mgmt, ADL's   Clinical Decision Making (Complexity) Moderate complexity   Therapy Frequency daily   Predicted Duration of Therapy Intervention (days/wks) 5 days   Anticipated Discharge Disposition Home with Home Therapy   Risks and Benefits of Treatment have been explained. Yes   Patient, Family & other staff in agreement with plan of care Yes   [place] [facility] \"6 Clicks\"   2016, Trustees of [facility], under license to [employer].  All rights reserved.   6 Clicks Short Forms Daily Activity Inpatient Short Form   [facility]  \"6 Clicks\" Daily Activity Inpatient Short Form   1. Putting on and taking off regular lower body clothing? 4 - None   2. Bathing (including washing, rinsing, drying)? 3 - A Little   3. Toileting, which includes using toilet, bedpan or urinal? 3 - A Little   4. Putting on and taking off regular upper body clothing? 4 - None   5. Taking care of personal grooming such as brushing teeth? 4 - None   6. Eating meals? 4 - None   Daily Activity Raw Score (Score out of 24.Lower scores equate to " lower levels of function) 22   Total Evaluation Time   Total Evaluation Time (Minutes) 15[CV1.1]        Revision History        User Key Date/Time User Provider Type Action    > CV1.1 12/2/2017 10:22 AM Ana María Romero, OT Occupational Therapist Sign

## 2017-12-01 NOTE — ED PROVIDER NOTES
"  History     Chief Complaint:  Aphasia    HPI   Daija Burgess is a 82 year old female with a medical history of vertigo and cancer who presents with aphasia. Patient was getting ready to get on a flight from [place] to [place]. As she began to leave her daughter's house around 6:30 AM this morning, her daughter noticed slurred and difficulty with speech, as well as the patient stating that she was having a difficult time speaking. Daughter suggested that she go to the hospital however the patient was not agreeable with that plan and continued to go to the airport. Patient also grabbed a pen and paper to write but was having difficulty writing. During the plane ride, patient was not talkative but did tell the daughter that she was experiencing some right hand weakness per daughter. The patient took an aspirin this morning, and 2 of the low dose aspirins during the plane ride. By the time of arrival to [place], patient agreed to go to the hospital for further evaluation. Patient does not have a history of stroke.    Allergies:  No known drug allergies     Medications:    Multaq  Aspirin  Pravastatin sodium  Thera-vit  Colace  Escitalopram oxalate  Zonisamide    Past Medical History:    Cancer  Depressive disorder  Vertigo    Past Surgical History:    Cholecystectomy  Colonoscopy  Orthopedic surgery    Family History:    History reviewed. No pertinent family history.      Social History:  Smoking status: Never smoker  Alcohol use: None      Review of Systems   Neurological: Positive for speech difficulty and weakness.   All other systems reviewed and are negative.    Physical Exam   Patient Vitals for the past 24 hrs:   BP Temp Temp src Pulse Heart Rate Resp SpO2 Height Weight   12/01/17 1719 - - - - - - - 1.651 m (5' 5\") -   12/01/17 1718 166/79 97.6  F (36.4  C) Oral 66 - 20 97 % - 98.7 kg (217 lb 8 oz)   12/01/17 1645 158/76 - - - 68 21 94 % - -   12/01/17 1630 150/73 - - - 67 18 95 % - -   12/01/17 1615 " "170/77 - - - 73 12 95 % - -   12/01/17 1526 167/74 98.2  F (36.8  C) Oral - 85 - 99 % 1.651 m (5' 5\") 90.7 kg (200 lb)      Physical Exam  GENERAL: well developed, pleasant  HEAD: atraumatic  EYES: pupils reactive, extraocular muscles intact, conjunctivae normal  ENT:  mucus membranes moist  NECK:  trachea midline, normal range of motion  RESPIRATORY: no tachypnea, breath sounds clear to auscultation   CVS: normal S1/S2, no murmurs, intact distal pulses  ABDOMEN: soft, nontender, nondistention  MUSCULOSKELETAL: no deformities  SKIN: warm and dry, no acute rashes or ulceration  NEURO: Weakness to the nasal fold to the right. Thickened speech and difficult to understand. Normal finger to nose. Normal leg raise.  PSYCH:  Mood/affect normal    Emergency Department Course   Imaging:  Radiographic findings were communicated with the patient and family who voiced understanding of the findings.  CT Head w Contrast  Negative CT brain perfusion imaging.  As read by Radiology.     CTA Angiogram Head Neck  1. No significant stenosis is seen at either carotid bifurcation.  2. No arterial dissection is seen.  3. No occluded intracranial vessels or high-grade intracranial  vascular stenosis is identified.    I agree with the preliminary report that was communicated to the  referring physician.   As read by Radiology.    CT Head w/o Contrast  Chronic changes. No evidence for intracranial hemorrhage  or any acute process.  As read by Radiology.     Laboratory:  BMP: Glucose 144 (H), GFR estimate 57 (L) WNL (Creatinine 0.94)   CBC: WNL (WBC 7.5, HGB 14.0, )    INR: 0.98  PTT: 24    Emergency Department Course:  Past medical records, nursing notes, and vitals reviewed.  1533: I performed an exam of the patient and obtained history, as documented above.   1535: IV inserted and blood drawn.   1538: Code stroke was called.   The patient was sent for a CT head and CTA angiogram while in the emergency department, findings above. "   1549: I spoke with Dr. Moncada, neurology and discussed the patient.  1609: I spoke with radiology and discussed the patient.   1610: I rechecked the patient and explained the findings to the patient and the patient's daughters.   1653: I spoke with Vignesh and discussed the patient.   Findings and plan explained to the Patient and daughters who consents to admission.     Discussed the patient with Dr. Medellin, who will admit the patient to a medical tele bed for further monitoring, evaluation, and treatment.      Impression & Plan    CMS Diagnoses: The patient has stroke symptoms:           ED Stroke specific documentation           NIHSS PDF          Protocol PDF     Patient last known well time: 0630  ED Provider first to bedside at: 1533  CT Results received at: 1608  Patient was not treated with TPA due to the following reason(s):  Out of the treatment time window      National [employer] Stroke Scale (Baseline)  Time Performed: 1534      Score    Level of consciousness: (0)   Alert, keenly responsive    LOC questions: (0)   Answers both questions correctly    LOC commands: (0)   Performs both tasks correctly    Best gaze: (0)   Normal    Visual: (0)   No visual loss    Facial palsy: (1)   Minor paralysis (flat nasolabial fold, smile asymmetry)    Motor arm (left): (0)   No drift    Motor arm (right): (0)   No drift    Motor leg (left): (0)   No drift    Motor leg (right): (0)   No drift    Limb ataxia: (0)   Absent    Sensory: (0)   Normal- no sensory loss    Best language: (0)   Normal- no aphasia    Dysarthria: (2)   Severe dysaphria    Extinction and inattention: (0)   No abnormality        Total Score:  3        Stroke Mimics were considered (including migraine headache, seizure disorder, hypoglycemia (or hyperglycemia), head or spinal trauma, CNS infection, Toxin ingestion and shock state (e.g. sepsis) .    Medical Decision Making:  Daija Burgess is a 82 year old female who presents with speech  deficits starting this morning at 6:30. The patient still has pronounced speech difficulties with slight weakness to the right nasolabial fold. CT, CTA was obtained showing no evidence of stroke or large vessel disease. Patient given rectal aspirin. Family was a bit confused about the whole stroke work-up and treatment inquiring if she needs to be admitted to get an MRI. I discussed with them that there is more to it than just an MRI in terms of stroke management. I spoke with Dr. Moncada from neurology and Dr. Medellin form the hospital group.   Critical Care time:  none    Diagnosis:    ICD-10-CM   1. Cerebrovascular accident (CVA), unspecified mechanism (H) I63.9     Disposition:  Admitted to medicine    Josefina Olguin  12/1/2017    EMERGENCY DEPARTMENT  Josefina IRAHETA Do, am serving as a scribe at 3:33 PM on 12/1/2017 to document services personally performed by Bay Sylvester MD based on my observations and the provider's statements to me.       Bay Sylvester MD  12/01/17 205

## 2017-12-01 NOTE — PHARMACY-ADMISSION MEDICATION HISTORY
Admission medication history interview status for the 12/1/2017  admission is complete. See EPIC admission navigator for prior to admission medications     Medication history source reliability:Good, med bottles    Actions taken by pharmacist (provider contacted, etc):None     Additional medication history information not noted on PTA med list : none    Medication reconciliation/reorder completed by provider prior to medication history? No    Time spent in this activity: 12 min    Prior to Admission medications    Medication Sig Last Dose Taking? Auth Provider   PRAVASTATIN SODIUM PO Take 20 mg by mouth every evening  11/30/2017 at Unknown time Yes Reported, Patient   Docusate Sodium (COLACE PO) Take 100 mg by mouth daily  12/1/2017 at Unknown time Yes Reported, Patient   ESCITALOPRAM OXALATE PO Take 5 mg by mouth daily  12/1/2017 at Unknown time Yes Reported, Patient   ASPIRIN PO Take 162 mg by mouth daily  12/1/2017 at Unknown time Yes Reported, Patient   ZONISAMIDE PO Take 100 mg by mouth At Bedtime  11/30/2017 at Unknown time Yes Reported, Patient   Dronedarone HCl (MULTAQ PO) Take 400 mg by mouth 2 times daily (with meals)  12/1/2017 at am Yes Reported, Patient

## 2017-12-01 NOTE — IP AVS SNAPSHOT
[facility]    [address]    [place] [address]    Phone:  [phone]                                       After Visit Summary   12/1/2017    Daija Burgess    MRN: [id number]           After Visit Summary Signature Page     I have received my discharge instructions, and my questions have been answered. I have discussed any challenges I see with this plan with the nurse or doctor.    ..........................................................................................................................................  Patient/Patient Representative Signature      ..........................................................................................................................................  Patient Representative Print Name and Relationship to Patient    ..................................................               ................................................  Date                                            Time    ..........................................................................................................................................  Reviewed by Signature/Title    ...................................................              ..............................................  Date                                                            Time

## 2017-12-01 NOTE — IP AVS SNAPSHOT
MRN:[id number]                      After Visit Summary   12/1/2017    Daija Burgess    MRN: [id number]           Thank you!     Thank you for choosing [facility] for your care. Our goal is always to provide you with excellent care. Hearing back from our patients is one way we can continue to improve our services. Please take a few minutes to complete the written survey that you may receive in the mail after you visit with us. Thank you!        Patient Information     Date Of Birth          1935        Designated Caregiver       Most Recent Value    Caregiver    Will someone help with your care after discharge? yes    Name of designated caregiver Carly Russ(daughter)    Phone number of caregiver [phone]    Caregiver address see demographics      About your hospital stay     You were admitted on:  December 1, 2017 You last received care in the:  [facility] [address] [facility]    You were discharged on:  December 4, 2017        Reason for your hospital stay       Stroke                  Who to Call     For medical emergencies, please call 911.  For non-urgent questions about your medical care, please call your primary care provider or clinic, [phone]          Attending Provider     Provider Specialty    Bay Sylvester MD Emergency Medicine    [place], Jessee Harp MD Internal Medicine       Primary Care Provider Office Phone # Fax #    Dia Bairon Davenport [phone]       When to contact your care team       Call your primary doctor if you have any of the following: increased weakness or speech difficulty .                  After Care Instructions     Activity       Your activity upon discharge: activity as tolerated            Diet       Follow this diet upon discharge: Orders Placed This Encounter      Room Service      Combination Diet cardiac  Diet Adult; Thin Liquids (water, ice chips, juice, milk, gelatin, ice cream, etc)                   Follow-up Appointments     Follow-up and recommended labs and tests        Follow up with primary care provider, Physician No Ref-Primary, within 7 days for hospital follow- up.  No follow up labs or test are needed.                  Your next 10 appointments already scheduled     Dec 08, 2017 11:30 AM CST   Office Visit with Dia Davenport MD   [facility]    [address] [id number] [phone]           Bring a current list of meds and any records pertaining to this visit. For Physicals, please bring immunization records and any forms needing to be filled out. Please arrive 10 minutes early to complete paperwork.            Dec 11, 2017 10:30 AM CST   Holter Monitor with MARIE   [facility] - [facility] ([facility])    [address]  [facility] [id number] [phone]            Dec 18, 2017  1:30 PM CST   [facility] EP RETURN with Pao Martínez PA-C   [facility] ([facility])    [address]  [facility] [phone]              Additional Services     Follow-Up with Cardiac Advanced Practice Provider           Occupational Therapy Referral       *This therapy referral will be filtered to a centralized scheduling office at [facility] and the patient will receive a call to schedule an appointment at a [facility] location most convenient for them. *     [facility] provides Occupational Therapy evaluation and treatment and many specialty services across the [facility] system.  If requesting a specialty program, please choose from the list below.    If you have not heard from the scheduling office within 2 business days, please call [phone] for all locations, with the exception of [place], please call [phone].     Treatment: Evaluation & Treatment  Special  "Instructions/Modalities:   Special Programs: CVA    Please be aware that coverage of these services is subject to the terms and limitations of your health insurance plan.  Call member services at your health plan with any benefit or coverage questions.      **Note to Provider:  If you are referring outside of [facility] for the therapy appointment, please list the name of the location in the \"special instructions\" above, print the referral and give to the patient to schedule the appointment.            Speech Therapy Referral       *This therapy referral will be filtered to a centralized scheduling office at [facility] and the patient will receive a call to schedule an appointment at a [facility] location most convenient for them. *     [facility] provides Speech Therapy evaluation and treatment and many specialty services across the [facility] system.  If requesting a specialty program, please choose from the list below.  If you have not heard from the scheduling office within 2 business days, please call [phone] for all locations, with the exception of [place], please call [phone].       Treatment: Evaluation & Treatment  Speech Treatment Diagnosis: Dysarthria  Special Instructions:  CVA   Special Programs:     Please be aware that coverage of these services is subject to the terms and limitations of your health insurance plan.  Call member services at your health plan with any benefit or coverage questions.      **Note to Provider:  If you are referring outside of [facility] for the therapy appointment, please list the name of the location in the \"special instructions\" above, print the referral and give to the patient to schedule the appointment.                  Future tests that were ordered for you     Holter Monitor 24 hour - Adult                 Further instructions from your care team       Follow-up with a neurologist and cardiologist once you return to " [place]. Your PCP can help you set this up.     Your risk factors for stroke or TIA (transient ischemic attack):    Your Risk Factors Your Results Normal Ranges   High blood pressure BP Readings from Last 1 Encounters:   12/04/17 119/64    Less than 120/80   Cholesterol              Total Lab Results   Component Value Date    CHOL 137 12/01/2017      Less than 150    Triglycerides   Lab Results   Component Value Date    TRIG 93 12/01/2017    Less than 150   LDL Lab Results   Component Value Date    LDL 53 12/01/2017       Less than 70   HDL Lab Results   Component Value Date    HDL 65 12/01/2017            Greater than 40 (men)  Greater than 50 (women)   Diabetes   Recent Labs  Lab 12/01/17  1535   *    Fasting blood glucose    Smoking/tobacco use  Quit smoking and tobacco   Overweight  Lose 1-2 pounds a week   Lack of exercise  30 minutes moderate activity each day   Other risk factors include carotid (neck) artery disease, atrial fibrillation and stress. You may be on new medicine to treat high blood pressure, cholesterol, diabetes or atrial fibrillation.    Understanding Stroke Booklet given to patient. Please refer to booklet for further information.    Stroke warning signs and symptoms - CALL 911 right away for:  - Sudden numbness or weakness in the face, arm or leg (often on one side of the body).  - Sudden confusion or trouble understanding what is going on.  - Sudden blurred or decreased vision in one or both eyes.  - Sudden trouble speaking, loss of balance, dizziness or problems with coordination.  - Sudden, severe headache for no reason.  - Fainting or seizures.  - Symptoms may go away then come back suddenly.      Pending Results     Date and Time Order Name Status Description    12/2/2017 1446 EKG 12-lead, tracing only Preliminary             Statement of Approval     Ordered          12/04/17 1139  I have reviewed and agree with all the recommendations and orders detailed in this  "document.  EFFECTIVE NOW     Approved and electronically signed by:  Mohit Clark MD             Admission Information     Date & Time Provider Department Dept. Phone    2017 Jessee Medellin MD [address] [facility] [phone]      Your Vitals Were     Blood Pressure Pulse Temperature Respirations Height Weight    119/64 (BP Location: Left arm) 66 97.5  F (36.4  C) (Oral) 16 1.651 m (5' 5\") 98.7 kg (217 lb 8 oz)    Pulse Oximetry BMI (Body Mass Index)                93% 36.19 kg/m2          MyChart Information     [employer] lets you send messages to your doctor, view your test results, renew your prescriptions, schedule appointments and more. To sign up, go to [email or web address] . Click on \"Log in\" on the left side of the screen, which will take you to the Welcome page. Then click on \"Sign up Now\" on the right side of the page.     You will be asked to enter the access code listed below, as well as some personal information. Please follow the directions to create your username and password.     Your access code is: [email or web address]  Expires: 3/4/2018 11:55 AM     Your access code will  in 90 days. If you need help or a new code, please call your [place] clinic or [phone].        Care EveryWhere ID     This is your Care EveryWhere ID. This could be used by other organizations to access your [place] medical records  [id number]        Equal Access to Services     [facility] Center[facility] AH: Hadii marc ku hadasho Soomaali, waaxda luqadaha, qaybta kaalmada adeegyada, maggie burger. So [facility] [phone].    ATENCIÓN: Si habla español, tiene a weinberg disposición servicios gratuitos de asistencia lingüística. Llame al [phone].    We comply with applicable federal civil rights laws and [place] laws. We do not discriminate on the basis of race, color, national origin, age, disability, sex, sexual orientation, or gender identity.               Review of your " medicines      START taking        Dose / Directions    apixaban ANTICOAGULANT 5 MG tablet   Commonly known as:  ELIQUIS   Used for:  Cerebrovascular accident (CVA), unspecified mechanism (H)        Dose:  5 mg   Take 1 tablet (5 mg) by mouth 2 times daily   Quantity:  120 tablet   Refills:  0       metoprolol 50 MG tablet   Commonly known as:  LOPRESSOR   Used for:  Cerebrovascular accident (CVA), unspecified mechanism (H)        Dose:  25 mg   Take 0.5 tablets (25 mg) by mouth 2 times daily   Quantity:  120 tablet   Refills:  0         CONTINUE these medicines which may have CHANGED, or have new prescriptions. If we are uncertain of the size of tablets/capsules you have at home, strength may be listed as something that might have changed.        Dose / Directions    escitalopram 5 MG tablet   Commonly known as:  LEXAPRO   This may have changed:  medication strength   Used for:  Cerebrovascular accident (CVA), unspecified mechanism (H)        Dose:  5 mg   Take 1 tablet (5 mg) by mouth daily   Quantity:  60 tablet   Refills:  0       * MULTAQ PO   This may have changed:  Another medication with the same name was added. Make sure you understand how and when to take each.        Dose:  400 mg   Take 400 mg by mouth 2 times daily (with meals)   Refills:  0       * dronedarone 400 MG Tabs tablet   Commonly known as:  MULTAQ   This may have changed:  You were already taking a medication with the same name, and this prescription was added. Make sure you understand how and when to take each.   Used for:  Cerebrovascular accident (CVA), unspecified mechanism (H)        Dose:  400 mg   Take 1 tablet (400 mg) by mouth 2 times daily (with meals)   Quantity:  120 tablet   Refills:  0       * Notice:  This list has 2 medication(s) that are the same as other medications prescribed for you. Read the directions carefully, and ask your doctor or other care provider to review them with you.      CONTINUE these medicines which have  NOT CHANGED        Dose / Directions    COLACE PO   Notes to Patient:  Take as needed for constipation        Dose:  100 mg   Take 100 mg by mouth daily   Refills:  0       pravastatin 10 MG tablet   Commonly known as:  PRAVACHOL   Used for:  Cerebrovascular accident (CVA), unspecified mechanism (H)        Dose:  20 mg   Take 2 tablets (20 mg) by mouth every evening   Quantity:  120 tablet   Refills:  0       ZONISAMIDE PO        Dose:  100 mg   Take 100 mg by mouth At Bedtime   Refills:  0         STOP taking     ASPIRIN PO                Where to get your medicines      These medications were sent to [facility] [place], [place] - [address]  [address], [place] [address]     Phone:  [phone]     apixaban ANTICOAGULANT 5 MG tablet    dronedarone 400 MG Tabs tablet    escitalopram 5 MG tablet    metoprolol 50 MG tablet    pravastatin 10 MG tablet                Protect others around you: Learn how to safely use, store and throw away your medicines at www.disposemymeds.org.             Medication List: This is a list of all your medications and when to take them. Check marks below indicate your daily home schedule. Keep this list as a reference.      Medications           Morning Afternoon Evening Bedtime As Needed    apixaban ANTICOAGULANT 5 MG tablet   Commonly known as:  ELIQUIS   Take 1 tablet (5 mg) by mouth 2 times daily   Last time this was given:  5 mg on 12/4/2017  8:53 AM   Next Dose Due:  12/4/17 at 8pm            8am           8pm               COLACE PO   Take 100 mg by mouth daily   Notes to Patient:  Take as needed for constipation                                   escitalopram 5 MG tablet   Commonly known as:  LEXAPRO   Take 1 tablet (5 mg) by mouth daily   Last time this was given:  5 mg on 12/4/2017  8:53 AM   Next Dose Due:  12/5/17                                    metoprolol 50 MG tablet   Commonly known as:  LOPRESSOR   Take 0.5 tablets (25 mg) by mouth 2  times daily   Last time this was given:  50 mg on 12/4/2017  8:54 AM   Next Dose Due:  12/4/17 at 8pm            8am           8pm               * MULTAQ PO   Take 400 mg by mouth 2 times daily (with meals)   Last time this was given:  400 mg on 12/4/2017  8:53 AM   Next Dose Due:  12/4/17 at dinnertime            With breakfast           With dinner               * dronedarone 400 MG Tabs tablet   Commonly known as:  MULTAQ   Take 1 tablet (400 mg) by mouth 2 times daily (with meals)   Last time this was given:  400 mg on 12/4/2017  8:53 AM   Next Dose Due:  12/4/17 at dinnertime            With breakfast           With dinner               pravastatin 10 MG tablet   Commonly known as:  PRAVACHOL   Take 2 tablets (20 mg) by mouth every evening   Last time this was given:  10 mg on 12/3/2017  8:57 PM   Next Dose Due:  12/4/17 before bed                                   ZONISAMIDE PO   Take 100 mg by mouth At Bedtime   Last time this was given:  100 mg on 12/3/2017 10:57 PM   Next Dose Due:  12/4/17 at bedtime                                   * Notice:  This list has 2 medication(s) that are the same as other medications prescribed for you. Read the directions carefully, and ask your doctor or other care provider to review them with you.              More Information        Apixaban oral tablets  Brand Name: Eliquis  What is this medicine?  APIXABAN (a PIX a ban) is an anticoagulant (blood thinner). It is used to lower the chance of stroke in people with a medical condition called atrial fibrillation. It is also used to treat or prevent blood clots in the lungs or in the veins.  How should I use this medicine?  Take this medicine by mouth with a glass of water. Follow the directions on the prescription label. You can take it with or without food. If it upsets your stomach, take it with food. Take your medicine at regular intervals. Do not take it more often than directed. Do not stop taking except on your doctor's  advice. Stopping this medicine may increase your risk of a blot clot. Be sure to refill your prescription before you run out of medicine.  Talk to your pediatrician regarding the use of this medicine in children. Special care may be needed.  What side effects may I notice from receiving this medicine?  Side effects that you should report to your doctor or health care professional as soon as possible:    allergic reactions like skin rash, itching or hives, swelling of the face, lips, or tongue    signs and symptoms of bleeding such as bloody or black, tarry stools; red or dark-brown urine; spitting up blood or brown material that looks like coffee grounds; red spots on the skin; unusual bruising or bleeding from the eye, gums, or nose  What may interact with this medicine?  This medicine may interact with the following:    aspirin and aspirin-like medicines    certain medicines for fungal infections like ketoconazole and itraconazole    certain medicines for seizures like carbamazepine and phenytoin    certain medicines that treat or prevent blood clots like warfarin, enoxaparin, and dalteparin    clarithromycin    NSAIDs, medicines for pain and inflammation, like ibuprofen or naproxen    rifampin    ritonavir    Overbrook's wort  What if I miss a dose?  If you miss a dose, take it as soon as you can. If it is almost time for your next dose, take only that dose. Do not take double or extra doses.  Where should I keep my medicine?  Keep out of the reach of children.  Store at room temperature between 20 and 25 degrees C (68 and 77 degrees F). Throw away any unused medicine after the expiration date.  What should I tell my health care provider before I take this medicine?  They need to know if you have any of these conditions:    bleeding disorders    bleeding in the brain    blood in your stools (black or tarry stools) or if you have blood in your vomit    history of stomach bleeding    kidney disease    liver  disease    mechanical heart valve    an unusual or allergic reaction to apixaban, other medicines, foods, dyes, or preservatives    pregnant or trying to get pregnant    breast-feeding  What should I watch for while using this medicine?  Visit your doctor or health care professional for regular checks on your progress.  Notify your doctor or health care professional and seek emergency treatment if you develop breathing problems; changes in vision; chest pain; severe, sudden headache; pain, swelling, warmth in the leg; trouble speaking; sudden numbness or weakness of the face, arm or leg. These can be signs that your condition has gotten worse.  If you are going to have surgery or other procedure, tell your doctor that you are taking this medicine.  NOTE:This sheet is a summary. It may not cover all possible information. If you have questions about this medicine, talk to your doctor, pharmacist, or health care provider. Copyright  2017 [employer]                Patient Education    Metoprolol Succinate Oral tablet, extended-release    Metoprolol Tartrate Oral tablet    Metoprolol Tartrate Solution for injection  Metoprolol Tartrate Oral tablet  What is this medicine?  METOPROLOL (me TOE proe lole) is a beta-blocker. Beta-blockers reduce the workload on the heart and help it to beat more regularly. This medicine is used to treat high blood pressure and to prevent chest pain. It is also used to after a heart attack and to prevent an additional heart attack from occurring.  This medicine may be used for other purposes; ask your health care provider or pharmacist if you have questions.  What should I tell my health care provider before I take this medicine?  They need to know if you have any of these conditions:    diabetes    heart or vessel disease like slow heart rate, worsening heart failure, heart block, sick sinus syndrome or Raynaud's disease    kidney disease    liver disease    lung or breathing disease, like  asthma or emphysema    pheochromocytoma    thyroid disease    an unusual or allergic reaction to metoprolol, other beta-blockers, medicines, foods, dyes, or preservatives    pregnant or trying to get pregnant    breast-feeding  How should I use this medicine?  Take this medicine by mouth with a drink of water. Follow the directions on the prescription label. Take this medicine immediately after meals. Take your doses at regular intervals. Do not take more medicine than directed. Do not stop taking this medicine suddenly. This could lead to serious heart-related effects.  Talk to your pediatrician regarding the use of this medicine in children. Special care may be needed.  Overdosage: If you think you have taken too much of this medicine contact a poison control center or emergency room at once.  NOTE: This medicine is only for you. Do not share this medicine with others.  What if I miss a dose?  If you miss a dose, take it as soon as you can. If it is almost time for your next dose, take only that dose. Do not take double or extra doses.  What may interact with this medicine?  This medicine may interact with the following medications:    certain medicines for blood pressure, heart disease, irregular heart beat    certain medicines for depression like monoamine oxidase (MAO) inhibitors, fluoxetine, or paroxetine    clonidine    dobutamine    epinephrine    isoproterenol    reserpine  This list may not describe all possible interactions. Give your health care provider a list of all the medicines, herbs, non-prescription drugs, or dietary supplements you use. Also tell them if you smoke, drink alcohol, or use illegal drugs. Some items may interact with your medicine.  What should I watch for while using this medicine?  Visit your doctor or health care professional for regular check ups. Contact your doctor right away if your symptoms worsen. Check your blood pressure and pulse rate regularly. Ask your health care  professional what your blood pressure and pulse rate should be, and when you should contact them.  You may get drowsy or dizzy. Do not drive, use machinery, or do anything that needs mental alertness until you know how this medicine affects you. Do not sit or stand up quickly, especially if you are an older patient. This reduces the risk of dizzy or fainting spells. Contact your doctor if these symptoms continue. Alcohol may interfere with the effect of this medicine. Avoid alcoholic drinks.  What side effects may I notice from receiving this medicine?  Side effects that you should report to your doctor or health care professional as soon as possible:    allergic reactions like skin rash, itching or hives    cold or numb hands or feet    depression    difficulty breathing    faint    fever with sore throat    irregular heartbeat, chest pain    rapid weight gain    swollen legs or ankles  Side effects that usually do not require medical attention (report to your doctor or health care professional if they continue or are bothersome):    anxiety or nervousness    change in sex drive or performance    dry skin    headache    nightmares or trouble sleeping    short term memory loss    stomach upset or diarrhea    unusually tired  This list may not describe all possible side effects. Call your doctor for medical advice about side effects. You may report side effects to FDA at 6-589-FDA-1863.  Where should I keep my medicine?  Keep out of the reach of children.  Store at room temperature between 15 and 30 degrees C (59 and 86 degrees F). Throw away any unused medicine after the expiration date.  NOTE:This sheet is a summary. It may not cover all possible information. If you have questions about this medicine, talk to your doctor, pharmacist, or health care provider. Copyright  2016 [employer]

## 2017-12-01 NOTE — ED NOTES
"[facility]  ED Nurse Handoff Report    ED Chief complaint: Aphasia (expressive aphasia started at 8am, got on flight from [place] to [place] and during flight right hand became weak then strength returned. Now expressive aphasia)      ED Diagnosis:   Final diagnoses:   None       Code Status: Full Code    Allergies: No Known Allergies    Activity level - Baseline/Home:  Independent    Activity Level - Current:   Stand with Assist     Needed?: No    Isolation: No  Infection: Not Applicable    Bariatric?: No    Vital Signs:   Vitals:    12/01/17 1526   BP: 167/74   Temp: 98.2  F (36.8  C)   TempSrc: Oral   SpO2: 99%   Weight: 90.7 kg (200 lb)   Height: 1.651 m (5' 5\")       Cardiac Rhythm: ,        Pain level:      Is this patient confused?: No    Patient Report: Initial Complaint: Patient presents to ED straight from airport with daughters. Patients daughter that lives near her in [place] and patient stayed overnight with her and woke up this morning and told her daughter that she was having trouble getting words out. Daughter wanted to take her to ED in [facility] but patient demanded that they get on plane to come here to see other daughter. During flight, daughter states that patient said that her R hand was very weak and she couldn't hold onto anything. This subsided but daughter states that her speaking has been getting worse as the day went on.   Focused Assessment: Patient is aphasic on arrival to ED, garbled speech and R sided facial droop. All extremities are equal in strength. patient is on blood thinner for afib. Slight headache but no other pain.   Tests Performed: Labs, Head CT/CTA  Abnormal Results: Labs WNL, CT pending   Treatments provided: Patient took a 325mg aspirin and 2 81mg aspirin PTA    Family Comments: Daughters at bedside     OBS brochure/video discussed/provided to patient: N/A    ED Medications:   Medications   Saline Flush - CT (100 mLs Intravenous Given 12/1/17 1600) "   iopamidol (ISOVUE-370) solution 120 mL (120 mLs Intravenous Given 12/1/17 1600)       Drips infusing?:  No      ED NURSE PHONE NUMBER: [phone]

## 2017-12-01 NOTE — IP AVS SNAPSHOT
"          [facility] [address] [facility]: [phone]                                              INTERAGENCY TRANSFER FORM - LAB / IMAGING / EKG / EMG RESULTS   2017                    Hospital Admission Date: 2017  KEYANNA SEGOVIA   : 1935  Sex: Female        Attending Provider: Jessee Medellin MD     Allergies:  No Known Allergies    Infection:  None   Service:  HOSPITALIST    Ht:  1.651 m (5' 5\")   Wt:  98.7 kg (217 lb 8 oz)   Admission Wt:  90.7 kg (200 lb)    BMI:  36.19 kg/m 2   BSA:  2.13 m 2            Patient PCP Information     Provider PCP Type    Dia Davenport MD General         Lab Results - 3 Days      Creatinine [[id number]] (Abnormal)  Resulted: 17 0856, Result status: Final result    Ordering provider: Niharika Russ [facility]  17 0000 Resulting lab: [facility]    Specimen Information    Type Source Collected On   Blood  17 0828          Components       Value Reference Range Flag Lab   Creatinine 0.94 0.52 - 1.04 mg/dL  FrStHsLb   GFR Estimate 57 >60 mL/min/1.7m2 L FrStHsLb   Comment:  Non  GFR Calc   GFR Estimate If Black 69 >60 mL/min/1.7m2  FrStHsLb   Comment:   GFR Calc            Glucose by meter [[id number]] (Abnormal)  Resulted: 17 0831, Result status: Final result    Ordering provider: Jessee Medellin MD  17 Resulting lab: POINT OF CARE TEST, GLUCOSE    Specimen Information    Type Source Collected On     17 08          Components       Value Reference Range Flag Lab   Glucose 103 70 - 99 mg/dL H 170            Glucose by meter [[id number]] (Abnormal)  Resulted: 17 231, Result status: Final result    Ordering provider: Jessee Medellin MD  17 Resulting lab: POINT OF CARE TEST, GLUCOSE    Specimen Information    Type Source Collected On     17          Components       Value Reference Range Flag Lab   Glucose 101 70 - 99 mg/dL H " 170            Glucose by meter [[id number]]  Resulted: 12/03/17 0635, Result status: Final result    Ordering provider: Jessee Medellin MD  12/03/17 0630 Resulting lab: POINT OF CARE TEST, GLUCOSE    Specimen Information    Type Source Collected On     12/03/17 0630          Components       Value Reference Range Flag Lab   Glucose 87 70 - 99 mg/dL  170            Glucose by meter [[id number]]  Resulted: 12/02/17 2126, Result status: Final result    Ordering provider: Jessee Medellin MD  12/02/17 2121 Resulting lab: POINT OF CARE TEST, GLUCOSE    Specimen Information    Type Source Collected On     12/02/17 2121          Components       Value Reference Range Flag Lab   Glucose 81 70 - 99 mg/dL  170            Troponin I [[id number]]  Resulted: 12/02/17 0151, Result status: Final result    Ordering provider: Jessee Medellin MD  12/01/17 1930 Resulting lab: [facility]    Specimen Information    Type Source Collected On   Blood  12/02/17 0128          Components       Value Reference Range Flag Lab   Troponin I ES <0.015 0.000 - 0.045 ug/L  FrStHsLb   Comment:         The 99th percentile for upper reference range is 0.045 ug/L.  Troponin values   in the range of 0.045 - 0.120 ug/L may be associated with risks of adverse   clinical events.              TSH with free T4 reflex [[id number]]  Resulted: 12/01/17 1938, Result status: Final result    Ordering provider: Jessee Medellin MD  12/01/17 1717 Resulting lab: [facility]    Specimen Information    Type Source Collected On   Blood  12/01/17 1903          Components       Value Reference Range Flag Lab   TSH 2.00 0.40 - 4.00 mU/L  FrStHsLb            Hemoglobin A1c [[id number]] (Abnormal)  Resulted: 12/01/17 1937, Result status: Final result    Ordering provider: Jessee Medellin MD  12/01/17 1717 Resulting lab: [facility]    Specimen Information    Type Source Collected On   Blood  12/01/17 1903           Components       Value Reference Range Flag Lab   Hemoglobin A1C 6.2 4.3 - 6.0 % H FrStHsLb            Troponin I [[id number]]  Resulted: 12/01/17 1934, Result status: Final result    Ordering provider: Jessee Medellin MD  12/01/17 1717 Resulting lab: [facility]    Specimen Information    Type Source Collected On   Blood  12/01/17 1903          Components       Value Reference Range Flag Lab   Troponin I ES <0.015 0.000 - 0.045 ug/L  FrStHsLb   Comment:         The 99th percentile for upper reference range is 0.045 ug/L.  Troponin values   in the range of 0.045 - 0.120 ug/L may be associated with risks of adverse   clinical events.              CRP inflammation [[id number]]  Resulted: 12/01/17 1934, Result status: Final result    Ordering provider: Jessee Medellin MD  12/01/17 1717 Resulting lab: [facility]    Specimen Information    Type Source Collected On   Blood  12/01/17 1903          Components       Value Reference Range Flag Lab   CRP Inflammation <2.9 0.0 - 8.0 mg/L  FrStHsLb            Lipid panel reflex to direct LDL [[id number]]  Resulted: 12/01/17 1934, Result status: Final result    Ordering provider: Jessee Medellin MD  12/01/17 1717 Resulting lab: [facility]    Specimen Information    Type Source Collected On   Blood  12/01/17 1903          Components       Value Reference Range Flag Lab   Cholesterol 137 <200 mg/dL  FrStHsLb   Triglycerides 93 <150 mg/dL  FrStHsLb   HDL Cholesterol 65 >49 mg/dL  FrStHsLb   LDL Cholesterol Calculated 53 <100 mg/dL  FrStHsLb   Comment:  Desirable:       <100 mg/dl   Non HDL Cholesterol 72 <130 mg/dL  FrStHsLb            Vitamin D Deficiency [[id number]]  Resulted: 12/01/17 1909, Result status: In process    Ordering provider: Jessee Medellin MD  12/01/17 1717 Resulting lab: [facility]    Specimen Information    Type Source Collected On   Blood  12/01/17 1903            INR [[id number]]  Resulted:  12/01/17 1612, Result status: Final result    Ordering provider: Vinay Hodgson MD  12/01/17 1535 Resulting lab: [facility]    Specimen Information    Type Source Collected On     12/01/17 1535          Components       Value Reference Range Flag Lab   INR 0.98 0.86 - 1.14  FrStHsLb            Partial thromboplastin time [[id number]]  Resulted: 12/01/17 1612, Result status: Final result    Ordering provider: Vinay Hodgson MD  12/01/17 1535 Resulting lab: [facility]    Specimen Information    Type Source Collected On     12/01/17 1535          Components       Value Reference Range Flag Lab   PTT 24 22 - 37 sec  FrStHsLb            Basic metabolic panel [[id number]] (Abnormal)  Resulted: 12/01/17 1611, Result status: Final result    Ordering provider: Vinay Hodgson MD  12/01/17 1535 Resulting lab: [facility]    Specimen Information    Type Source Collected On     12/01/17 1535          Components       Value Reference Range Flag Lab   Sodium 140 133 - 144 mmol/L  FrStHsLb   Potassium 3.9 3.4 - 5.3 mmol/L  FrStHsLb   Chloride 108 94 - 109 mmol/L  FrStHsLb   Carbon Dioxide 24 20 - 32 mmol/L  FrStHsLb   Anion Gap 8 3 - 14 mmol/L  FrStHsLb   Glucose 144 70 - 99 mg/dL H FrStHsLb   Urea Nitrogen 16 7 - 30 mg/dL  FrStHsLb   Creatinine 0.94 0.52 - 1.04 mg/dL  FrStHsLb   GFR Estimate 57 >60 mL/min/1.7m2 L FrStHsLb   Comment:  Non  GFR Calc   GFR Estimate If Black 69 >60 mL/min/1.7m2  FrStHsLb   Comment:  African American GFR Calc   Calcium 9.1 8.5 - 10.1 mg/dL  FrStHsLb            CBC with platelets differential [[id number]]  Resulted: 12/01/17 1553, Result status: Final result    Ordering provider: Vinay Hodgson MD  12/01/17 1535 Resulting lab: [facility]    Specimen Information    Type Source Collected On     12/01/17 1535          Components       Value Reference Range Flag Lab   WBC 7.5 4.0 - 11.0 10e9/L   FrStHsLb   RBC Count 4.54 3.8 - 5.2 10e12/L  FrStHsLb   Hemoglobin 14.0 11.7 - 15.7 g/dL  FrStHsLb   Hematocrit 42.2 35.0 - 47.0 %  FrStHsLb   MCV 93 78 - 100 fl  FrStHsLb   MCH 30.8 26.5 - 33.0 pg  FrStHsLb   MCHC 33.2 31.5 - 36.5 g/dL  FrStHsLb   RDW 13.3 10.0 - 15.0 %  FrStHsLb   Platelet Count 209 150 - 450 10e9/L  FrStHsLb   Diff Method Automated Method   FrStHsLb   % Neutrophils 73.8 %  FrStHsLb   % Lymphocytes 19.4 %  FrStHsLb   % Monocytes 6.5 %  FrStHsLb   % Eosinophils 0.1 %  FrStHsLb   % Basophils 0.1 %  FrStHsLb   % Immature Granulocytes 0.1 %  FrStHsLb   Nucleated RBCs 0 0 /100  FrStHsLb   Absolute Neutrophil 5.6 1.6 - 8.3 10e9/L  FrStHsLb   Absolute Lymphocytes 1.5 0.8 - 5.3 10e9/L  FrStHsLb   Absolute Monocytes 0.5 0.0 - 1.3 10e9/L  FrStHsLb   Absolute Eosinophils 0.0 0.0 - 0.7 10e9/L  FrStHsLb   Absolute Basophils 0.0 0.0 - 0.2 10e9/L  FrStHsLb   Abs Immature Granulocytes 0.0 0 - 0.4 10e9/L  FrStHsLb   Absolute Nucleated RBC 0.0   FrStHsLb            Testing Performed By     Lab - Abbreviation Name Director Address Valid Date Range    14 - FrStHsLb [facility] Unknown [address] [place] [address] 05/08/15 1057 - Present    45 - IOY338 [facility] Unknown Unknown 01/28/02 0000 - Present    170 - Unknown POINT OF CARE TEST, GLUCOSE Unknown Unknown 10/31/11 1114 - Present            Unresulted Labs (24h ago through future)    Start       Ordered    12/04/17 0600  Creatinine  EVERY THREE DAYS,   Routine     Comments:  Last Lab Result: Creatinine (mg/dL)       Date                     Value                 12/01/2017               0.94             ----------    12/02/17 1150    12/02/17 2000  Glucose Whole Blood POCT  3 TIMES DAILY,   Routine      12/02/17 1957         Imaging Results - 3 Days      CT Head w/o Contrast [[id number]]  Resulted: 12/02/17 1059, Result status: Final result    Ordering provider: Betsey Hill MD  12/02/17 0928 Resulted by: Jeffery Goins MD     Performed: 12/02/17 0950 - 12/02/17 0955 Resulting lab: RADIOLOGY RESULTS    Narrative:       CT SCAN OF THE HEAD WITHOUT CONTRAST   12/2/2017 9:55 AM     HISTORY: Follow-up cerebrovascular incident.      TECHNIQUE: Axial images of the head and coronal reformations without  IV contrast material.  Radiation dose for this scan was reduced using  automated exposure control, adjustment of the mA and/or kV according  to patient size, or iterative reconstruction technique.    COMPARISON: MR dated 12/1/2017.    FINDINGS: The left posterolateral acute frontal ischemic infarct seen  on MR is not as readily appreciated on CT. There is no evidence for  any hemorrhagic transformation. Mild cerebral atrophy is present along  with some minimal nonspecific white matter changes. There is no  evidence for hydrocephalus, mass effect, or skull fracture.      Impression:       IMPRESSION:  1. No evidence for hemorrhagic transformation of posterolateral left  frontal infarct.  2. Mild cerebral atrophy with minimal nonspecific white matter  changes.    SAI ESTRADA MD      MRI Brain w & w/o contrast [[id number]]  Resulted: 12/01/17 2206, Result status: Final result    Ordering provider: Jessee Medellin MD  12/01/17 1717 Resulted by: Miko Adam MD    Performed: 12/01/17 2126 - 12/01/17 2144 Resulting lab: RADIOLOGY RESULTS    Narrative:       MR BRAIN W/O & W CONTRAST  12/1/2017 9:44 PM     HISTORY:  Acute CVI;     TECHNIQUE: Multiplanar, multisequence MRI of the brain without and  with 9mL gadavist IV contrast material.    COMPARISON: CT studies dated 12/1/2017.    FINDINGS:  Diffusion-weighted images reveal area of restricted  diffusion in the lateral cortex of the left posterior frontal lobe.  This area of restricted diffusion measures approximately 3 cm in  transverse dimension by 1 cm in AP dimension. This area of restricted  diffusion is consistent with an acute cortical infarct..  There is no  hemorrhage or mass  effect.. There are no gadolinium enhancing lesions.   The facial structures appear normal.  The arteries at the base of the  brain and the dural venous sinuses appear patent.      Impression:       IMPRESSION: Study is consistent with an acute infarct in the lateral  cortex of the left frontal lobe.     CYNTHIA NOGUERA MD      CTA Angiogram Head Neck [[id number]]  Resulted: 12/01/17 1836, Result status: Final result    Ordering provider: Bay Sylvester MD  12/01/17 1544 Resulted by: Miko Noguera MD    Performed: 12/01/17 1554 - 12/01/17 1604 Resulting lab: RADIOLOGY RESULTS    Narrative:       CTA ANGIOGRAM HEAD NECK  12/1/2017 4:04 PM     HISTORY: code stroke;     TECHNIQUE:  Precontrast localizing scans were followed by CT  angiography with an injection of 70 mL Isovue -370 IV contrast with  scans through the head and neck.  Images were transferred to a  separate 3-D workstation where multiplanar reformations and 3-D images  were created.  Estimates of carotid stenoses are made relative to the  distal internal carotid artery diameters except as noted. Radiation  dose for this scan was reduced using automated exposure control,  adjustment of the mA and/or kV according to patient size, or iterative  reconstruction technique.    COMPARISON: None.    FINDINGS: Estimates of stenosis at the carotid bifurcations are  relative to the distal internal carotids.    Arch: Calcified atherosclerotic plaque in the arch. Normal anatomy.    Neck:  Right Carotid:  The right common carotid artery is normal.  Calcified  atherosclerotic plaque is seen at the right carotid bifurcation. No  significant stenosis is seen..  Calcified plaque in the carotid  siphon..     Left Carotid:  Left common carotid artery is tortuous..  Calcified  atherosclerotic plaque is seen at the left carotid bifurcation. No  significant stenosis is seen. Stenosis was calculated at 25%..  Calcified atherosclerotic plaque is seen in the left carotid siphon..        Vertebrals:  The vertebral arteries are normal.    Head:  Right Carotid:No occluded vessels are seen. .    Left Carotid:  No occluded vessels are identified. .    Basilar:  The basilar artery and its branches appear normal.       Impression:       IMPRESSION:   1. No significant stenosis is seen at either carotid bifurcation.  2. No arterial dissection is seen.  3. No occluded intracranial vessels or high-grade intracranial  vascular stenosis is identified.    I agree with the preliminary report that was communicated to the  referring physician.     CYNTHIA NOGUERA MD      CT Head w/o Contrast [[id number]]  Resulted: 12/01/17 1631, Result status: Final result    Ordering provider: Bay Sylvester MD  12/01/17 1544 Resulted by: Sai Estrada MD    Performed: 12/01/17 1554 - 12/01/17 1557 Resulting lab: RADIOLOGY RESULTS    Narrative:       CT SCAN OF THE HEAD WITHOUT CONTRAST   12/1/2017 3:57 PM     HISTORY: Code stroke.    TECHNIQUE:  Axial images of the head and coronal reformations without  IV contrast material.  Radiation dose for this scan was reduced using  automated exposure control, adjustment of the mA and/or kV according  to patient size, or iterative reconstruction technique.    COMPARISON: None.    FINDINGS: There is some mild cerebral atrophy. Minimal nonspecific  white matter changes are present without mass effect. There is no  evidence for intracranial hemorrhage, mass effect, acute infarct, or  skull fracture.      Impression:       IMPRESSION: Chronic changes. No evidence for intracranial hemorrhage  or any acute process.      SAI ESTRADA MD      CT Head w Contrast [[id number]]  Resulted: 12/01/17 1631, Result status: Final result    Ordering provider: Bay Sylvester MD  12/01/17 1544 Resulted by: Sai Estrada MD    Performed: 12/01/17 1554 - 12/01/17 1608 Resulting lab: RADIOLOGY RESULTS    Narrative:       CT HEAD WITH CONTRAST 12/1/2017 4:08 PM     HISTORY: Aphasia.    TECHNIQUE:  Axial images were obtained through the brain with  intravenous contrast for CT brain perfusion imaging. 50 mL of  Isovue-370 was given. Multiplanar reconstructions were performed.  Radiation dose for this scan was reduced using automated exposure  control, adjustment of the mA and/or kV according to patient size, or  iterative reconstruction technique..    FINDINGS: Cerebral blood flow, cerebral blood volume, mean transit  time maps are symmetric. There is no evidence for ischemia or infarct.      Impression:       IMPRESSION: Negative CT brain perfusion imaging.    SAI ESTRADA MD      Testing Performed By     Lab - Abbreviation Name Director Address Valid Date Range    104 - Rad Rslts RADIOLOGY RESULTS Unknown Unknown 05 1553 - Present               ECG/EMG Results      Echocardiogram Complete [[id number]]  Resulted: 17 1031, Result status: Edited Result - FINAL    Ordering provider: Tito Mckeon MD  17 1512 Resulted by: Haja Sheriff MD    Performed: 17 1054 - 17 1056 Resulting lab: RADIOLOGY RESULTS    Narrative:       [id number]  [facility]  [id number]  [id number]^[id number]^DOV           [facility]  Echocardiography Laboratory  [address] [place] [address]        Name: KEYANNA SEGOVIA  MRN: [id number]  : 1935  Study Date: 2017 10:31 AM  Age: 82 yrs  Gender: Female  Patient Location: [facility]  Reason For Study: Afib  Ordering Physician: TITO MCKEON  Referring Physician: TITO MCKEON  Performed By: Caity Mccarty RDCS     BSA: 2.0 m2  Height: 65 in  Weight: 217 lb  HR: 97  BP: 142/74 mmHg  _____________________________________________________________________________  __        Procedure  Complete Echo Adult.  _____________________________________________________________________________  __        Interpretation Summary     The rhythm was atrial flutter.  The left ventricle is normal in size.  Left ventricular systolic function is normal.  The  visual ejection fraction is estimated at 55-60%.  Normal left ventricular wall motion  The left atrium is mildly dilated.  There is no comparison study available.  _____________________________________________________________________________  __        Left Ventricle  The left ventricle is normal in size. There is normal left ventricular wall  thickness. Left ventricular systolic function is normal. The visual ejection  fraction is estimated at 55-60%. Normal left ventricular wall motion.     Right Ventricle  The right ventricle is normal in structure, function and size.     Atria  The left atrium is mildly dilated. Right atrial size is normal. There is no  atrial shunt seen.     Mitral Valve  The mitral valve is normal in structure and function. There is trace mitral  regurgitation.        Tricuspid Valve  The tricuspid valve is normal in structure and function. There is trace  tricuspid regurgitation. Right ventricular systolic pressure could not be  approximated due to inadequate tricuspid regurgitation. Normal IVC (1.5-2.5cm)  with >50% respiratory collapse; right atrial pressure is estimated at 5-  10mmHg.     Aortic Valve  The aortic valve is normal in structure and function. No aortic regurgitation  is present. No aortic stenosis is present.     Pulmonic Valve  The pulmonic valve is not well seen, but is grossly normal. There is trace  pulmonic valvular regurgitation.     Vessels  Normal size aorta. The IVC is normal in size and reactivity with respiration,  suggesting normal central venous pressure.     Pericardium  There is no pericardial effusion.        Rhythm  The rhythm was atrial flutter.  _____________________________________________________________________________  __  MMode/2D Measurements & Calculations  IVSd: 1.1 cm     LVIDd: 3.9 cm  LVIDs: 3.3 cm  LVPWd: 1.1 cm  FS: 16.5 %  EDV(Teich): 67.3 ml  ESV(Teich): 43.7 ml  LV mass(C)d: 138.8 grams  LV mass(C)dI: 67.8 grams/m2  Ao root diam: 3.2 cm  LA  dimension: 3.4 cm  asc Aorta Diam: 3.4 cm  LA/Ao: 1.1  LVOT diam: 2.1 cm  LVOT area: 3.6 cm2  LA Volume (BP): 69.8 ml  LA Volume Index (BP): 34.0 ml/m2  RWT: 0.56           Doppler Measurements & Calculations  MV E max tucker: 90.9 cm/sec  MV dec time: 0.22 sec  E/E' avg: 10.8  Lateral E/e': 11.8  Medial E/e': 9.9           _____________________________________________________________________________  __           Report approved by: Rabia Urban 12/03/2017 12:03 PM       1    Type Source Collected On     12/03/17 1031          View Image (below)              Encounter-Level Documents:     There are no encounter-level documents.      Order-Level Documents:     There are no order-level documents.

## 2017-12-02 ENCOUNTER — APPOINTMENT (OUTPATIENT)
Dept: SPEECH THERAPY | Facility: CLINIC | Age: 82
DRG: 066 | End: 2017-12-02
Attending: INTERNAL MEDICINE
Payer: MEDICARE

## 2017-12-02 ENCOUNTER — APPOINTMENT (OUTPATIENT)
Dept: CT IMAGING | Facility: CLINIC | Age: 82
DRG: 066 | End: 2017-12-02
Attending: PSYCHIATRY & NEUROLOGY
Payer: MEDICARE

## 2017-12-02 ENCOUNTER — APPOINTMENT (OUTPATIENT)
Dept: PHYSICAL THERAPY | Facility: CLINIC | Age: 82
DRG: 066 | End: 2017-12-02
Attending: INTERNAL MEDICINE
Payer: MEDICARE

## 2017-12-02 ENCOUNTER — APPOINTMENT (OUTPATIENT)
Dept: OCCUPATIONAL THERAPY | Facility: CLINIC | Age: 82
DRG: 066 | End: 2017-12-02
Attending: INTERNAL MEDICINE
Payer: MEDICARE

## 2017-12-02 LAB
GLUCOSE BLDC GLUCOMTR-MCNC: 81 MG/DL (ref 70–99)
TROPONIN I SERPL-MCNC: <0.015 UG/L (ref 0–0.04)

## 2017-12-02 PROCEDURE — 70450 CT HEAD/BRAIN W/O DYE: CPT

## 2017-12-02 PROCEDURE — 25000132 ZZH RX MED GY IP 250 OP 250 PS 637: Mod: GY | Performed by: INTERNAL MEDICINE

## 2017-12-02 PROCEDURE — 40000193 ZZH STATISTIC PT WARD VISIT

## 2017-12-02 PROCEDURE — 36415 COLL VENOUS BLD VENIPUNCTURE: CPT | Performed by: INTERNAL MEDICINE

## 2017-12-02 PROCEDURE — 93010 ELECTROCARDIOGRAM REPORT: CPT | Performed by: INTERNAL MEDICINE

## 2017-12-02 PROCEDURE — 84484 ASSAY OF TROPONIN QUANT: CPT | Performed by: INTERNAL MEDICINE

## 2017-12-02 PROCEDURE — 40000133 ZZH STATISTIC OT WARD VISIT: Performed by: OCCUPATIONAL THERAPIST

## 2017-12-02 PROCEDURE — 99221 1ST HOSP IP/OBS SF/LOW 40: CPT | Performed by: INTERNAL MEDICINE

## 2017-12-02 PROCEDURE — 12000000 ZZH R&B MED SURG/OB

## 2017-12-02 PROCEDURE — A9270 NON-COVERED ITEM OR SERVICE: HCPCS | Mod: GY | Performed by: PSYCHIATRY & NEUROLOGY

## 2017-12-02 PROCEDURE — A9270 NON-COVERED ITEM OR SERVICE: HCPCS | Mod: GY | Performed by: INTERNAL MEDICINE

## 2017-12-02 PROCEDURE — 25000132 ZZH RX MED GY IP 250 OP 250 PS 637: Mod: GY | Performed by: PSYCHIATRY & NEUROLOGY

## 2017-12-02 PROCEDURE — 97161 PT EVAL LOW COMPLEX 20 MIN: CPT | Mod: GP

## 2017-12-02 PROCEDURE — 00000146 ZZHCL STATISTIC GLUCOSE BY METER IP

## 2017-12-02 PROCEDURE — 40000225 ZZH STATISTIC SLP WARD VISIT

## 2017-12-02 PROCEDURE — 97535 SELF CARE MNGMENT TRAINING: CPT | Mod: GO | Performed by: OCCUPATIONAL THERAPIST

## 2017-12-02 PROCEDURE — 97165 OT EVAL LOW COMPLEX 30 MIN: CPT | Mod: GO | Performed by: OCCUPATIONAL THERAPIST

## 2017-12-02 PROCEDURE — 93005 ELECTROCARDIOGRAM TRACING: CPT

## 2017-12-02 PROCEDURE — 99233 SBSQ HOSP IP/OBS HIGH 50: CPT | Performed by: INTERNAL MEDICINE

## 2017-12-02 PROCEDURE — 92610 EVALUATE SWALLOWING FUNCTION: CPT | Mod: GN

## 2017-12-02 PROCEDURE — 25000128 H RX IP 250 OP 636: Performed by: INTERNAL MEDICINE

## 2017-12-02 PROCEDURE — 92526 ORAL FUNCTION THERAPY: CPT | Mod: GN

## 2017-12-02 RX ORDER — METOPROLOL TARTRATE 25 MG/1
25 TABLET, FILM COATED ORAL 2 TIMES DAILY
Status: DISCONTINUED | OUTPATIENT
Start: 2017-12-02 | End: 2017-12-03

## 2017-12-02 RX ADMIN — SODIUM CHLORIDE: 9 INJECTION, SOLUTION INTRAVENOUS at 04:20

## 2017-12-02 RX ADMIN — APIXABAN 5 MG: 5 TABLET, FILM COATED ORAL at 20:08

## 2017-12-02 RX ADMIN — SODIUM CHLORIDE: 9 INJECTION, SOLUTION INTRAVENOUS at 15:39

## 2017-12-02 RX ADMIN — METOPROLOL TARTRATE 25 MG: 25 TABLET ORAL at 18:23

## 2017-12-02 RX ADMIN — APIXABAN 5 MG: 5 TABLET, FILM COATED ORAL at 12:04

## 2017-12-02 ASSESSMENT — ACTIVITIES OF DAILY LIVING (ADL)
ADLS_ACUITY_SCORE: 8
PREVIOUS_RESPONSIBILITIES: MEAL PREP;HOUSEKEEPING;LAUNDRY;SHOPPING;YARDWORK;MEDICATION MANAGEMENT;FINANCES;DRIVING
ADLS_ACUITY_SCORE: 8
ADLS_ACUITY_SCORE: 8

## 2017-12-02 ASSESSMENT — VISUAL ACUITY
OU: NORMAL ACUITY

## 2017-12-02 NOTE — PROGRESS NOTES
Pt A&O, VSS. garbled speech with difficult speaking. Neuros- PERRLA-intact. NPO settled and handed over to the next shift. Continue to monitor.

## 2017-12-02 NOTE — CONSULTS
Neurology Consultation Note          Assessment and Plan:     Left hemispheric cardioembolic infarction    Paroxysmal atrial fibrillation    She does not have any contraindications to anticoagulation, therefore I would recommend repeating a HCT today and if there is no hemorrhagic conversion to start anticoagulation. I would recommend apixaban once she has passed her swallow evaluation. If swallowing is significantly affected and she is NPO (which I think unlikely) then we would anticoagulate with heparin initially. Start rehabilitation effort. She has not seen her cardiologist in 3 years a=since he was retired, so a review by cardiology may be useful. The family are in favor of a cardiology consultation. We will defer to hospitalist service. Discussed with patient and her daughters.    ADDENDUM: repeat HCT without hemorrhagic conversion, patient able to swallow, we will start apixaban.         History of Present Illness:   I was asked to provide neurological consultation for Mrs. Daija Burgess with regard to a recent stroke. She is attended by two daughters. She lives in [place], and yesterday am she boarded an airplane to come to [place] for a family function. While boarding she noticed that she had some word finding difficulty. During the flight she did not talk much, and felt that her right hand was slightly weak, especially fingers II-III. No numbness. Her speech was noted to be slurred as well, so she cane to the hospital for further evaluation. Some f her symptoms had improved in the interim, and the work up in the ED showed no large vessel occlusion or bleed. She did take 3 low intensity aspirins before arriving to the hospital. She was outside the window for IV tPA. A brain MRI obtained last night confirmed a left cortical  embolic infarct. Her tele strips have been in NSR overnight. She has a history of PAF which has been treated with Multaq for many years and she has not had any TIAs. However in the  "last few months she has had episodes where she feels weak and devoid of energy, without any sensation of palpitations. I accessed her records from [place], and it does not look like there has been any specific contraindication to anticoagulation. She has a rthritic gait pattern, but no falls and she is independent in her ambulation.          Review of Systems:   The 10 point Review of Systems is negative other than noted in the HPI. She has chronic vertigo, for which she is on zonisamide, which has helped.           Past Medical History:     Past Medical History:   Diagnosis Date     Cancer (H)      Depressive disorder      Vertigo             Past Surgical History:     Past Surgical History:   Procedure Laterality Date     CHOLECYSTECTOMY       COLONOSCOPY       ORTHOPEDIC SURGERY              Medications:       sodium chloride (PF)  3 mL Intracatheter Q8H     naloxone, - MEDICATION INSTRUCTIONS -, acetaminophen, ondansetron **OR** ondansetron, labetalol, hydrALAZINE, metoprolol, sodium chloride (PF)                   Allergies:   None; however she states that she is sensitive to many medications.         Family History:   Mother and sister had strokes.         Social History:     Social History     Social History     Marital status:      Spouse name: N/A     Number of children: N/A     Years of education: N/A     Occupational History     Not on file.     Social History Main Topics     Smoking status: Never Smoker     Smokeless tobacco: Not on file     Alcohol use No     Drug use: No     Sexual activity: Not on file     Other Topics Concern     Not on file     Social History Narrative     No narrative on file                 Physical Exam:   Vitals were reviewed  Blood pressure 139/71, pulse 66, temperature 98.2  F (36.8  C), temperature source Oral, resp. rate 18, height 1.651 m (5' 5\"), weight 98.7 kg (217 lb 8 oz), SpO2 97 %.  Wt Readings from Last 4 Encounters:   12/01/17 98.7 kg (217 lb 8 oz) "     Temperatures:  Current - Temp: 98.2  F (36.8  C); Max - Temp  Av.9  F (36.6  C)  Min: 97.5  F (36.4  C)  Max: 98.2  F (36.8  C)  Blood pressure range: Systolic (24hrs), Av , Min:139 , Max:170   ; Diastolic (24hrs), Av, Min:71, Max:82    I/O last 3 completed shifts:  In: 403 [I.V.:403]  Out: 650 [Urine:650]  General appearance:  healthy, alert and no distress, appears hydarated, vital signs stable  and overweight  Cardiovascular:  NORMAL - regular rate and rhythm without murmur. and carotids with brisk upstroke/without bruit bilaterally easily palpated bilaterally  Neurologic:   Mental Status Exam:   Level of Alertness:   awake  Orientation:   normal to person, place, time  Memory:   normal for age (2/3)  Fund of Knowledge:  normal  Attention/Concentration:  normal  Language:  Slurred with word finding difficulty; naming, reading, repetition, commands are normal.  Cranial Nerves: Visual fields: full; EOM: intact; Pupils: PERRL; Fundi: No papilledema; V, VII: no facial asymmetry to sensory, very slight right facial weakness; tongue, palate: midline; shoulder and neck muscle strength: symmetric. Moderate dysarthria.  Motor Exam:  moves all extremities well and symmetrically.  Sensory:  sensory intact to light touch/vibration.  Coordination: finger/Nose:  right:  normal; left:  Normal; heel-Knee-Shin:  right:  normal; left:  normal; rapid Alternating Movements:  right:  normal; left:  normal.  Gait: independent, slightly waddling.  Deep Tendon Reflexes:  reflexes are hypoactive and symmetrical, except for a reduced left KJ compared to right; plantar response:  Right: flexor; left: flexor.              Data:   All laboratory and imaging data reviewed by me:  Results for orders placed or performed during the hospital encounter of 17 (from the past 24 hour(s))   Basic metabolic panel   Result Value Ref Range    Sodium 140 133 - 144 mmol/L    Potassium 3.9 3.4 - 5.3 mmol/L    Chloride 108 94 - 109  mmol/L    Carbon Dioxide 24 20 - 32 mmol/L    Anion Gap 8 3 - 14 mmol/L    Glucose 144 (H) 70 - 99 mg/dL    Urea Nitrogen 16 7 - 30 mg/dL    Creatinine 0.94 0.52 - 1.04 mg/dL    GFR Estimate 57 (L) >60 mL/min/1.7m2    GFR Estimate If Black 69 >60 mL/min/1.7m2    Calcium 9.1 8.5 - 10.1 mg/dL   CBC with platelets differential   Result Value Ref Range    WBC 7.5 4.0 - 11.0 10e9/L    RBC Count 4.54 3.8 - 5.2 10e12/L    Hemoglobin 14.0 11.7 - 15.7 g/dL    Hematocrit 42.2 35.0 - 47.0 %    MCV 93 78 - 100 fl    MCH 30.8 26.5 - 33.0 pg    MCHC 33.2 31.5 - 36.5 g/dL    RDW 13.3 10.0 - 15.0 %    Platelet Count 209 150 - 450 10e9/L    Diff Method Automated Method     % Neutrophils 73.8 %    % Lymphocytes 19.4 %    % Monocytes 6.5 %    % Eosinophils 0.1 %    % Basophils 0.1 %    % Immature Granulocytes 0.1 %    Nucleated RBCs 0 0 /100    Absolute Neutrophil 5.6 1.6 - 8.3 10e9/L    Absolute Lymphocytes 1.5 0.8 - 5.3 10e9/L    Absolute Monocytes 0.5 0.0 - 1.3 10e9/L    Absolute Eosinophils 0.0 0.0 - 0.7 10e9/L    Absolute Basophils 0.0 0.0 - 0.2 10e9/L    Abs Immature Granulocytes 0.0 0 - 0.4 10e9/L    Absolute Nucleated RBC 0.0    INR   Result Value Ref Range    INR 0.98 0.86 - 1.14   Partial thromboplastin time   Result Value Ref Range    PTT 24 22 - 37 sec   CT Head w/o Contrast    Narrative    CT SCAN OF THE HEAD WITHOUT CONTRAST   12/1/2017 3:57 PM     HISTORY: Code stroke.    TECHNIQUE:  Axial images of the head and coronal reformations without  IV contrast material.  Radiation dose for this scan was reduced using  automated exposure control, adjustment of the mA and/or kV according  to patient size, or iterative reconstruction technique.    COMPARISON: None.    FINDINGS: There is some mild cerebral atrophy. Minimal nonspecific  white matter changes are present without mass effect. There is no  evidence for intracranial hemorrhage, mass effect, acute infarct, or  skull fracture.      Impression    IMPRESSION: Chronic  changes. No evidence for intracranial hemorrhage  or any acute process.      SAI ESTRADA MD   CTA Angiogram Head Neck    Narrative    CTA ANGIOGRAM HEAD NECK  12/1/2017 4:04 PM     HISTORY: code stroke;     TECHNIQUE:  Precontrast localizing scans were followed by CT  angiography with an injection of 70 mL Isovue -370 IV contrast with  scans through the head and neck.  Images were transferred to a  separate 3-D workstation where multiplanar reformations and 3-D images  were created.  Estimates of carotid stenoses are made relative to the  distal internal carotid artery diameters except as noted. Radiation  dose for this scan was reduced using automated exposure control,  adjustment of the mA and/or kV according to patient size, or iterative  reconstruction technique.    COMPARISON: None.    FINDINGS: Estimates of stenosis at the carotid bifurcations are  relative to the distal internal carotids.    Arch: Calcified atherosclerotic plaque in the arch. Normal anatomy.    Neck:  Right Carotid:  The right common carotid artery is normal.  Calcified  atherosclerotic plaque is seen at the right carotid bifurcation. No  significant stenosis is seen..  Calcified plaque in the carotid  siphon..     Left Carotid:  Left common carotid artery is tortuous..  Calcified  atherosclerotic plaque is seen at the left carotid bifurcation. No  significant stenosis is seen. Stenosis was calculated at 25%..  Calcified atherosclerotic plaque is seen in the left carotid siphon..       Vertebrals:  The vertebral arteries are normal.    Head:  Right Carotid:No occluded vessels are seen. .    Left Carotid:  No occluded vessels are identified. .    Basilar:  The basilar artery and its branches appear normal.       Impression    IMPRESSION:   1. No significant stenosis is seen at either carotid bifurcation.  2. No arterial dissection is seen.  3. No occluded intracranial vessels or high-grade intracranial  vascular stenosis is identified.    I  agree with the preliminary report that was communicated to the  referring physician.     CYNTHIA NOGUERA MD   CT Head w Contrast    Narrative    CT HEAD WITH CONTRAST 12/1/2017 4:08 PM     HISTORY: Aphasia.    TECHNIQUE: Axial images were obtained through the brain with  intravenous contrast for CT brain perfusion imaging. 50 mL of  Isovue-370 was given. Multiplanar reconstructions were performed.  Radiation dose for this scan was reduced using automated exposure  control, adjustment of the mA and/or kV according to patient size, or  iterative reconstruction technique..    FINDINGS: Cerebral blood flow, cerebral blood volume, mean transit  time maps are symmetric. There is no evidence for ischemia or infarct.      Impression    IMPRESSION: Negative CT brain perfusion imaging.    SAI ESTRADA MD   Troponin I   Result Value Ref Range    Troponin I ES <0.015 0.000 - 0.045 ug/L   CRP inflammation   Result Value Ref Range    CRP Inflammation <2.9 0.0 - 8.0 mg/L   Hemoglobin A1c   Result Value Ref Range    Hemoglobin A1C 6.2 (H) 4.3 - 6.0 %   Lipid panel reflex to direct LDL   Result Value Ref Range    Cholesterol 137 <200 mg/dL    Triglycerides 93 <150 mg/dL    HDL Cholesterol 65 >49 mg/dL    LDL Cholesterol Calculated 53 <100 mg/dL    Non HDL Cholesterol 72 <130 mg/dL   TSH with free T4 reflex   Result Value Ref Range    TSH 2.00 0.40 - 4.00 mU/L   MRI Brain w & w/o contrast    Narrative    MR BRAIN W/O & W CONTRAST  12/1/2017 9:44 PM     HISTORY:  Acute CVI;     TECHNIQUE: Multiplanar, multisequence MRI of the brain without and  with 9mL gadavist IV contrast material.    COMPARISON: CT studies dated 12/1/2017.    FINDINGS:  Diffusion-weighted images reveal area of restricted  diffusion in the lateral cortex of the left posterior frontal lobe.  This area of restricted diffusion measures approximately 3 cm in  transverse dimension by 1 cm in AP dimension. This area of restricted  diffusion is consistent with an acute cortical  infarct..  There is no  hemorrhage or mass effect.. There are no gadolinium enhancing lesions.   The facial structures appear normal.  The arteries at the base of the  brain and the dural venous sinuses appear patent.      Impression    IMPRESSION: Study is consistent with an acute infarct in the lateral  cortex of the left frontal lobe.     CYNTHIA NOGUERA MD   Troponin I   Result Value Ref Range    Troponin I ES <0.015 0.000 - 0.045 ug/L

## 2017-12-02 NOTE — PLAN OF CARE
Problem: Patient Care Overview  Goal: Plan of Care/Patient Progress Review  Outcome: Improving  A&Ox4. VSS on RA. Up with 1assist. Neuros intact ex garbled speech and difficulty getting words out at times. Denies pain. IVF infusing. Neuro to see today.

## 2017-12-02 NOTE — PROGRESS NOTES
12/02/17 1600   Living Environment   Lives With alone   Living Arrangements house   Home Accessibility bed and bath are not on the first floor;stairs to enter home;stairs within home   Number of Stairs to Enter Home 14   Number of Stairs Within Home 10   Transportation Available car;family or friend will provide   Self-Care   Usual Activity Tolerance good   Current Activity Tolerance good   Functional Level Prior   Ambulation 0-->independent   Transferring 0-->independent   Toileting 0-->independent   Bathing 0-->independent   Dressing 0-->independent   Prior Functional Level Comment At baseline, pt lives alone at home in [place]. Is planning to stay with daughter full time here in [place]. (possibly for 6 weeks)   General Information   Onset of Illness/Injury or Date of Surgery - Date 12/02/17   Patient/Family Goals Statement Return home   Pertinent History of Current Problem (include personal factors and/or comorbidities that impact the POC) 82 year old female admitted with aphasia and found to have a L hemispheric cardioembolic infarction.    Precautions/Limitations fall precautions   Cognitive Status Examination   Orientation orientation to person, place and time   Level of Consciousness alert   Follows Commands and Answers Questions 100% of the time;able to follow multistep instructions   Personal Safety and Judgment intact   Cognitive Comment (Noted impairments on the SLUMS performed with OT this AM. )   Range of Motion (ROM)   ROM Comment UE and LE AROM WFLs.    Strength   Strength Comments Sufficient for mobility with SBA.    Bed Mobility   Bed Mobility Comments Supine to/from sit with IND.    Transfer Skills   Transfer Comments Sit to/from stand with SBA   Gait   Gait Comments Ambulates 200' with independence, performs stairs with one rail and mod I   Balance   Balance Comments No dynamic or static balance deficits noted.    Sensory Examination   Sensory Perception Comments Denies burning, numbness and  "tingling   Coordination   Coordination no deficits were identified   Clinical Impression   Criteria for Skilled Therapeutic Intervention evaluation only;no problems identified which require skilled intervention  (To work with OT/SLP on current deficits)   Clinical Presentation Stable/Uncomplicated   Clinical Presentation Rationale Medically stable.    Clinical Decision Making (Complexity) Low complexity   Anticipated Discharge Disposition Home with Assist  (continued OT/SLP intervention)   Risk & Benefits of therapy have been explained Yes   Patient, Family & other staff in agreement with plan of care Yes   [facility] \"6 Clicks\"   2016, Trustees of [facility], under license to [employer].  All rights reserved.   6 Clicks Short Forms Basic Mobility Inpatient Short Form   [facility] AM-PAC  \"6 Clicks\" V.2 Basic Mobility Inpatient Short Form   1. Turning from your back to your side while in a flat bed without using bedrails? 4 - None   2. Moving from lying on your back to sitting on the side of a flat bed without using bedrails? 4 - None   3. Moving to and from a bed to a chair (including a wheelchair)? 4 - None   4. Standing up from a chair using your arms (e.g., wheelchair, or bedside chair)? 4 - None   5. To walk in hospital room? 4 - None   6. Climbing 3-5 steps with a railing? 4 - None   Basic Mobility Raw Score (Score out of 24.Lower scores equate to lower levels of function) 24   Total Evaluation Time   Total Evaluation Time (Minutes) 17     "

## 2017-12-02 NOTE — PROGRESS NOTES
12/02/17 0900   Quick Adds   Type of Visit Initial Occupational Therapy Evaluation   Living Environment   Lives With alone  (here visiting her daughter for her granddaughters play)   Living Arrangements house   Home Accessibility bed and bath are not on the first floor;stairs to enter home;stairs within home   Number of Stairs to Enter Home 14   Number of Stairs Within Home 10   Stair Railings at Home outside, present on left side;inside, present on left side   Transportation Available car;family or friend will provide   Self-Care   Usual Activity Tolerance excellent   Regular Exercise no   Equipment Currently Used at Home none   Activity/Exercise/Self-Care Comment Pt was I with ADL's/IADls including med mgmt, driving, shoveling, mowing   Functional Level Prior   Fall history within last six months no   Which of the above functional risks had a recent onset or change? none   General Information   Onset of Illness/Injury or Date of Surgery - Date 12/01/17   Referring Physician Vignesh   Patient/Family Goals Statement maximize I and return to PLOF   Additional Occupational Profile Info/Pertinent History of Current Problem pt traveled here from [facility] to see her granddaughters play for the weekend, staying with daughter. pt had R hand weakness, slurred speech but c ontinued on flight. once here facial droop worse and word finding difficulty. MRI shows acute infarct in Lateral cortex of Left frontal lobe.    Precautions/Limitations fall precautions   General Observations pt agreeable to OT eval, daughters present at beginning of eval, but then had to leave.    Cognitive Status Examination   Orientation orientation to person, place and time   Level of Consciousness alert   Able to Follow Commands WNL/WFL   Personal Safety (Cognitive) WNL/WFL   Memory impaired   Attention No deficits were identified   Cognitive Comment scored 22/30 on SBT   Visual Perception   Visual Perception Comments will continue to assess    Sensory Examination   Sensory Quick Adds No deficits were identified   Pain Assessment   Patient Currently in Pain No   Integumentary/Edema   Integumentary/Edema no deficits were identifed   Posture   Posture not impaired   Range of Motion (ROM)   ROM Quick Adds No deficits were identified   Strength   Manual Muscle Testing Quick Adds No deficits were identified   Hand Strength   Hand Strength Comments B Hands WFL   Muscle Tone Assessment   Muscle Tone Quick Adds No deficits were identified   Coordination   Upper Extremity Coordination No deficits were identified   Coordination Comments will continue to assess   Mobility   Bed Mobility Comments SBA with all mobility   Transfer Skill: Sit to Stand   Level of [place]: Sit/Stand stand-by assist   Physical Assist/Nonphysical Assist: Sit/Stand supervision   Transfer Skill: Sit to Stand full weight-bearing   Transfer Skill: Toilet Transfer   Level of [place]: Toilet stand-by assist   Physical Assist/Nonphysical Assist: Toilet supervision   Balance   Balance Comments not assessed fully as patient had to leave for [place]   Lower Body Dressing   Level of [place]: Dress Lower Body independent   Grooming   Level of [place]: Grooming stand-by assist   Instrumental Activities of Daily Living (IADL)   Previous Responsibilities meal prep;housekeeping;laundry;shopping;yardwork;medication management;finances;driving   Activities of Daily Living Analysis   Impairments Contributing to Impaired Activities of Daily Living balance impaired;cognition impaired   General Therapy Interventions   Planned Therapy Interventions ADL retraining;IADL retraining;balance training;cognition;fine motor coordination training;neuromuscular re-education;visual perception;transfer training;progressive activity/exercise;risk factor education;home program guidelines   Clinical Impression   Criteria for Skilled Therapeutic Interventions Met yes, treatment indicated   OT Diagnosis decreased  "I with ADL's and cognition   Influenced by the following impairments decreased cog, possibly decreased vision, decreased balance   Assessment of Occupational Performance 3-5 Performance Deficits   Identified Performance Deficits social skills, home mgmt, ADL's   Clinical Decision Making (Complexity) Moderate complexity   Therapy Frequency daily   Predicted Duration of Therapy Intervention (days/wks) 5 days   Anticipated Discharge Disposition Home with Home Therapy   Risks and Benefits of Treatment have been explained. Yes   Patient, Family & other staff in agreement with plan of care Yes   [facility] TM \"6 Clicks\"   2016, Trustees of [facility], under license to [employer].  All rights reserved.   6 Clicks Short Forms Daily Activity Inpatient Short Form   [facility] AM-PAC  \"6 Clicks\" Daily Activity Inpatient Short Form   1. Putting on and taking off regular lower body clothing? 4 - None   2. Bathing (including washing, rinsing, drying)? 3 - A Little   3. Toileting, which includes using toilet, bedpan or urinal? 3 - A Little   4. Putting on and taking off regular upper body clothing? 4 - None   5. Taking care of personal grooming such as brushing teeth? 4 - None   6. Eating meals? 4 - None   Daily Activity Raw Score (Score out of 24.Lower scores equate to lower levels of function) 22   Total Evaluation Time   Total Evaluation Time (Minutes) 15     "

## 2017-12-02 NOTE — PLAN OF CARE
Problem: Patient Care Overview  Goal: Plan of Care/Patient Progress Review  PT: Orders received, evaluation completed. 82 year old female admitted with aphasia and found to have a L hemispheric cardioembolic infarction. Patient reports independence with mobility/ADLs at baseline. Lives alone in a home in [place] with multiple stairs to manage.     Discharge Planner PT   Patient plan for discharge: To stay with daughter in [place] for 6 weeks prior to returning home  Current status: Performs bed mobility, transfers and ambulation with SBA/IND. No LOB or gait abnormalities noted. Performs 3 stairs with one rail and mod I.   Barriers to return to prior living situation: None from a PT perspective. Noted poor score on SLUMS; defer to OT/SLP for safe discharge recommendations.   Recommendations for discharge: Home with assist as recommended by OT/SLP with continued OT/SLP services.   Rationale for recommendations: Patient currently not demonstrating strength, balance, coordination endurance concerns. No skilled acute care PT needs identified at this time. Recommend patient to ambulate three times per day in [place] with staff during stay to maintain strength and activity tolerance.        Entered by: Jeremie Castillo 12/02/2017 4:32 PM

## 2017-12-02 NOTE — H&P
CODE STATUS:  Full code.      CHIEF COMPLAINT:  Slurred speech.      HISTORY OF PRESENT ILLNESS:  Ms. Daija Burgess is an 82-year-old female.  She has a past medical history of atrial fibrillation, chronically anticoagulated on Multaq.  She also has a remote history of colon cancer.  She is originally from [place], and flew here today to see her daughters.  She started having her first symptoms this morning when she was leaving her daughter's house in [place] at about 6:30 a.m.  They noticed some slurred speech, but she refused to go be seen, and they got on the airplane and flew to [place].  The symptoms increased a little bit initially with some right hand weakness and difficulty writing, and she started to develop a right-sided facial droop.  The hand weakness is pretty much resolved, but the facial droop and speech deficits are getting worse.  The patient is now outside of the window for TPA.  Her workup in the ED so far consists of CT head with and without contrast, and a CT angio of the head and neck, and this shows no deficit, no etiology to explain the patient's symptoms.  the CT angio does not show any occlusion of any vessels nor does she have any significant stenosis in either of her carotids nor a dissection.  They did call a code stroke.  Dr. Moncada from Neurology will see the patient, but she needs to be admitted as she has persistent neurological deficits and needs further workup.  The only treatment we can really offer at this point in time is high-dose rectal aspirin.  She needs to come in and get therapy evaluations.      ALLERGIES:  No known drug allergies.      HOME MEDICATION LIST:   1.  Pravastatin 20 mg every evening.   2.  Colace 100 mg every day.   3. Escitalopram 5 mg daily.   4.  Aspirin 162 mg daily.   5.  Zonisamide 100 mg at bedtime.     6.  Multaq 400 mg 2 times a day.      PAST MEDICAL HISTORY:   1.  Atrial fibrillation.   2.  Depression.   3.  Vertigo.    4.  Remote  history of colon cancer.      PAST SURGICAL HISTORY:  She has had a cholecystectomy, an orthopedic surgery and a colonoscopy.      FAMILY HISTORY:  I reviewed this with the patient and her daughters, and no known history of heart disease or stroke in the family.      SOCIAL HISTORY:  She has never used tobacco.  Does not drink alcohol.  No illicit drug history.  She is retired, , lives in [place] and is in town visiting family.      REVIEW OF SYSTEMS:  Review of systems with this patient was difficult as her slurred speech is significant, but she was able to shake her head yes/no, and I also was able to incorporate her daughters into the review.  After a full 10-system review, other than the symptoms listed in the HPI, negative.      PHYSICAL EXAMINATION:   VITAL SIGNS:  Blood temperature is 98.2 Fahrenheit, heart rate 68, respiratory rate is 21, blood pressure 158/76, O2 sats 94% on room air.   GENERAL:  This is an obese, elderly female in no apparent distress.  She is alert.  She has significantly slurred speech.   HEENT:  Normocephalic, atraumatic.  No oropharyngeal erythema.   NECK:  No cervical lymphadenopathy.  No thyromegaly.   RESPIRATORY:  Lungs clear to auscultation bilaterally.  Normal work of breathing.  No wheezes, rales or rhonchi.   CARDIOVASCULAR:  Normal S1, S2; no S3, S4; irregularly irregular; no murmurs, rubs or gallops; 2+ pulses palpable in all 4 extremities.   ABDOMEN:  Normal bowel sounds.  Abdomen is soft, nontender, nondistended.  No hepatosplenomegaly or mass palpable.   EXTREMITIES:  No clubbing, cyanosis or edema.   NEUROLOGIC:  She has a significant right facial droop, slurred speech.   strength is equal bilaterally.  Her right lower extremity is marginally weaker than the left.  Coordination is normal by finger-to-nose and heel-to-shin test.      LABORATORY EVALUATION:  Metabolic panel:  All values within normal limits.  Nonfasting glucose of 144.  CBC:  All values are  in normal limits.  Coags:  INR is 0.98.      IMAGING STUDIES:  CT head without contrast.  Impression:  Chronic changes, no evidence for intracranial hemorrhage or any other acute process.  CT head with contrast: Negative CT perfusion brain imaging.  CT angiogram head and neck.  Impression:  No significant stenosis seen at either carotid bifurcation.  N arterial dissection seen.  No occluded intracranial vessels or high-grade intracranial vascular stenosis identified.      ASSESSMENT:  This is an 82-year-old female with past medical history of atrial fibrillation, on aspirin and Multaq, also vertigo, remote history of colon cancer and depression.  She began having neurological deficits at 6:30 a.m. to include word finding difficulties, had progressed to full slurred speech, some right-sided weakness which seems to be resolving but speech deficits are worsening.  She is being admitted for further workup and treatment for suspected left hemispheric stroke.      PLAN:   1.  Suspected left hemispheric stroke.  Suspect this is probably in the language center.  She has pretty significant slurred speech, and the daughter said that earlier today when her speech was less slurred, she was having a hard time finding the right words to say.  The patient confirms this by nodding her head yes.  She has equal  strength bilaterally and her leg strength seems to be pretty much equal bilaterally, but facial droop and speech is definitely getting worse, per family's report.  Unfortunately, outside the window for TPA, and we really cannot find a lesion on CT.  She will need an MRI.  She has had bilateral knee replacements, but she said she has had MRIs in the past, sounds like these are compatible.  She has had MRIs on her on her brain in the past for workup for migraines and vertigo.  I asked the emergency room provider to give her full-dose rectal aspirin.  She will be n.p.o., admitted to neuro on Telemetry.  Suspect that she has  probably had the stroke secondary to atrial fibrillation.  Her CHADS2-VASc score is definitely above 2.  Will control her blood pressure tonight with IV labetalol and hydralazine, but permissively allow her blood pressure to go to 200 systolic and 100 diastolic.  She will need a formal Speech Therapy swallow evaluation because of her facial droop, so she will likely remain n.p.o. overnight, and we will have her on IV fluids, normal saline at 100 mL an hour.  I have consulted Neurology and the ED provider has staffed the patient with Dr. Moncada, so he is aware of the patient.   2.  History of atrial fibrillation.  We are going to watch her on Telemetry tonight.  I am unable to give her her Multaq because she is n.p.o., but I will have IV metoprolol available for her.  If her rate gets above 120, we can give her 5 mg IV.   3.  Depression.  Despite everything that was going on, her mood actually seems stable.  Unfortunately, I cannot give her her escitalopram, but will monitor closely.  I am hoping that we can return to p.o. within the next 12-24 hours, and we can resume her home meds.   4.  DVT prophylaxis:  PCDs due to risk of possible hemorrhagic conversion.      CODE STATUS:  The patient shakes her head yes to full code.  Family endorses this as well.      DISPOSITION:  Anticipate 2-3 days for possible workup and safe disposition planning.         MACI RICHARDS MD             D: 2017 17:15   T: 2017 18:24   MT: CARMEN      Name:     KEYANNA SEGOVIA   MRN:      [id number]        Account:      [id number]   :      1935           Admitted:     [id number]      Document: [id number]

## 2017-12-02 NOTE — PROGRESS NOTES
12/02/17 1022   General Information   Onset Date 12/01/17   Start of Care Date 12/02/17   Referring Physician Jessee Medellin MD   Patient Profile Review/OT: Additional Occupational Profile Info See Profile for full history and prior level of function   Patient/Family Goals Statement Pt would like coffee   Swallowing Evaluation Bedside swallow evaluation   Behaviorial Observations WFL (within functional limits)   Mode of current nutrition NPO   Respiratory Status Room air   Comments Daija Burgess is a 82 year old female with a medical history of vertigo and cancer who presents with aphasia. Patient was getting ready to get on a flight from [place] to [place]. As she began to leave her daughter's house around 6:30 AM this morning, her daughter noticed slurred and difficulty with speech, as well as the patient stating that she was having a difficult time speaking. Daughter suggested that she go to the hospital however the patient was not agreeable with that plan and continued to go to the airport. Pt continues to present with dysarthria and mild R facial droop. Bedside swallow eval completed per MD orders to further assess oropharyngeal swallow function.    Clinical Swallow Evaluation   Oral Musculature other (see comments)  (mild R sided droop)   Structural Abnormalities none present   Dentition present and adequate   Mucosal Quality adequate   Mandibular Strength and Mobility intact   Oral Labial Strength and Mobility WFL   Lingual Strength and Mobility WFL   Velar Elevation intact   Buccal Strength and Mobility intact   Laryngeal Function Cough;Throat clear;Swallow;Voicing initiated   Oral Musculature Comments dysarthria; mild R facial droop    Additional Documentation Yes   Clinical Swallow Eval: Thin Liquid Texture Trial   Mode of Presentation, Thin Liquids cup;self-fed   Volume of Liquid or Food Presented 4 oz   Oral Phase of Swallow WFL   Pharyngeal Phase of Swallow intact   Diagnostic  Statement Pt tolerated thin liquids via cup with no overt s/sx of aspiration   Clinical Swallow Eval: Puree Solid Texture Trial   Mode of Presentation, Puree spoon;self-fed   Volume of Puree Presented 5 tbsp   Oral Phase, Puree WFL   Pharyngeal Phase, Puree intact   Diagnostic Statement Pt tolerated pureed textures via spoon with no overt s/sx of aspiration    Clinical Swallow Eval: Solid Food Texture Trial   Mode of Presentation, Solid self-fed   Volume of Solid Food Presented 1 cracker   Oral Phase, Solid other (see comments)  (prolonged but functional time for mastication )   Pharyngeal Phase, Solid impaired;throat clearing   Diagnostic Statement Regular solid textures resulted in mildly prolonged but functional time for mastication; mild throat clearing noted x1. No other overt s/sx of aspiration on regular solid textures.    VFSS Evaluation   VFSS Additional Documentation No   FEES Evaluation   Additional Documentation No   Swallow Compensations   Swallow Compensations Alternate viscosity of consistencies;Effortful swallow;Pacing;Reduce amounts;Multiple swallow   Results Oral difficulties only   Esophageal Phase of Swallow   Patient reports or presents with symptoms of esophageal dysphagia No   General Therapy Interventions   Planned Therapy Interventions Dysphagia Treatment   Dysphagia treatment Compensatory strategies for swallowing;Instruction of safe swallow strategies   Swallow Eval: Clinical Impressions   Skilled Criteria for Therapy Intervention Skilled criteria met.  Treatment indicated.   Functional Assessment Scale (FAS) 5   Treatment Diagnosis mild oropharyngeal dysphagia   Diet texture recommendations Thin liquids;Regular diet  (self-select softer textures)   Recommended Feeding/Eating Techniques alternate between small bites and sips of food/liquid;check mouth frequently for oral residue/pocketing;hard swallow w/ each bite or sip;maintain upright posture during/after eating for 30 mins;small  sips/bites   Demonstrates Need for Referral to Another Service occupational therapy;physical therapy   Therapy Frequency 5 times/wk   Predicted Duration of Therapy Intervention (days/wks) 1 week   Anticipated Discharge Disposition other (see comments)  (defer pending PT/OT)   Risks and Benefits of Treatment have been explained. Yes   Patient, family and/or staff in agreement with Plan of Care Yes   Clinical Impression Comments SLP: Bedside swallow eval completed per MD orders. Pt presents with mild oropharyngeal dysphagia and moderate dysarthria. Pt noted to have mild R facial droop. Pt tolerated thin liquids via cup and pureed textures via spoon with no overt s/sx of aspiration. Regular solid textures resulted in mildly prolonged but functional time for mastication; mild throat clear noted x1 however no other overt s/sx of aspiration. Recommend pt initiate regular textures (self-select soft foods) and thin liquids. Pt should be fully upright for all PO, take small single sips/bites, pace self, and alternate between consistencies. ST to continue to follow for diet tolerance and completion of s/l eval pending d/c plans. Pt will require ongoing ST upon d/c to next level of care.    Total Evaluation Time   Total Evaluation Time (Minutes) 10

## 2017-12-02 NOTE — PLAN OF CARE
Problem: Patient Care Overview  Goal: Plan of Care/Patient Progress Review  Outcome: Therapy, progress toward functional goals as expected  OT: eval completed and treatment initiated.   Discharge Planner OT   Patient plan for discharge: none stated in chart  Current status: pt is SBA with functional mobility and BR transfers, SLUMS exam score 22/30 indicating moderate impairment. Difficulty with STM, drawing the clock, reversal of numbers. Pt needed to go to CT during eval so will continue this afternoon.   Barriers to return to prior living situation: staying at daughters home, had planned on just coming for the weekend but can stay longer as needed.   Recommendations for discharge: anticipate discharge home with OP if needed  Rationale for recommendations: pt has decreased cognition        Entered by: Ana María Romero 12/02/2017 12:17 PM

## 2017-12-02 NOTE — PROVIDER NOTIFICATION
Text page sent to hospitalist regarding restarting PTA meds as pt is on a regular diet. Awaiting call back/orders

## 2017-12-02 NOTE — PLAN OF CARE
Problem: Patient Care Overview  Goal: Plan of Care/Patient Progress Review  Discharge Planner SLP   Patient plan for discharge: none stated  Current status: Bedside swallow eval completed today. Pt presents with mild oropharyngeal dysphagia and moderate dysarthria. Pt noted to have mild R facial droop. Recommend pt initiate regular textures (self-select soft foods) and thin liquids. Pt should be fully upright for all PO, take small single sips/bites, pace self, and alternate between consistencies.   Barriers to return to prior living situation: dysphagia; dysarthria   Recommendations for discharge: ongoing ST upon d/c to next level of care  Rationale for recommendations: pt with moderate dysarthria impacting ability to communicate; dysphagia        Entered by: Lissett Velasquez 12/02/2017 10:36 AM

## 2017-12-03 ENCOUNTER — APPOINTMENT (OUTPATIENT)
Dept: SPEECH THERAPY | Facility: CLINIC | Age: 82
DRG: 066 | End: 2017-12-03
Payer: MEDICARE

## 2017-12-03 ENCOUNTER — APPOINTMENT (OUTPATIENT)
Dept: OCCUPATIONAL THERAPY | Facility: CLINIC | Age: 82
DRG: 066 | End: 2017-12-03
Payer: MEDICARE

## 2017-12-03 ENCOUNTER — APPOINTMENT (OUTPATIENT)
Dept: CARDIOLOGY | Facility: CLINIC | Age: 82
DRG: 066 | End: 2017-12-03
Attending: INTERNAL MEDICINE
Payer: MEDICARE

## 2017-12-03 LAB
GLUCOSE BLDC GLUCOMTR-MCNC: 101 MG/DL (ref 70–99)
GLUCOSE BLDC GLUCOMTR-MCNC: 87 MG/DL (ref 70–99)

## 2017-12-03 PROCEDURE — 25000132 ZZH RX MED GY IP 250 OP 250 PS 637: Mod: GY | Performed by: PSYCHIATRY & NEUROLOGY

## 2017-12-03 PROCEDURE — 93306 TTE W/DOPPLER COMPLETE: CPT

## 2017-12-03 PROCEDURE — 12000000 ZZH R&B MED SURG/OB

## 2017-12-03 PROCEDURE — 99232 SBSQ HOSP IP/OBS MODERATE 35: CPT | Performed by: INTERNAL MEDICINE

## 2017-12-03 PROCEDURE — 92526 ORAL FUNCTION THERAPY: CPT | Mod: GN

## 2017-12-03 PROCEDURE — 93306 TTE W/DOPPLER COMPLETE: CPT | Mod: 26 | Performed by: INTERNAL MEDICINE

## 2017-12-03 PROCEDURE — 25000132 ZZH RX MED GY IP 250 OP 250 PS 637: Mod: GY | Performed by: INTERNAL MEDICINE

## 2017-12-03 PROCEDURE — 99232 SBSQ HOSP IP/OBS MODERATE 35: CPT | Mod: 25 | Performed by: INTERNAL MEDICINE

## 2017-12-03 PROCEDURE — A9270 NON-COVERED ITEM OR SERVICE: HCPCS | Mod: GY | Performed by: INTERNAL MEDICINE

## 2017-12-03 PROCEDURE — 00000146 ZZHCL STATISTIC GLUCOSE BY METER IP

## 2017-12-03 PROCEDURE — 25000128 H RX IP 250 OP 636: Performed by: INTERNAL MEDICINE

## 2017-12-03 PROCEDURE — 25000125 ZZHC RX 250: Performed by: INTERNAL MEDICINE

## 2017-12-03 PROCEDURE — 40000133 ZZH STATISTIC OT WARD VISIT: Performed by: OCCUPATIONAL THERAPIST

## 2017-12-03 PROCEDURE — 40000225 ZZH STATISTIC SLP WARD VISIT

## 2017-12-03 PROCEDURE — A9270 NON-COVERED ITEM OR SERVICE: HCPCS | Mod: GY | Performed by: PSYCHIATRY & NEUROLOGY

## 2017-12-03 PROCEDURE — 97535 SELF CARE MNGMENT TRAINING: CPT | Mod: GO | Performed by: OCCUPATIONAL THERAPIST

## 2017-12-03 RX ORDER — ZONISAMIDE 100 MG/1
100 CAPSULE ORAL AT BEDTIME
Status: DISCONTINUED | OUTPATIENT
Start: 2017-12-03 | End: 2017-12-04 | Stop reason: HOSPADM

## 2017-12-03 RX ORDER — PRAVASTATIN SODIUM 20 MG
20 TABLET ORAL EVERY EVENING
Status: DISCONTINUED | OUTPATIENT
Start: 2017-12-03 | End: 2017-12-03

## 2017-12-03 RX ORDER — DOCUSATE SODIUM 100 MG/1
100 CAPSULE, LIQUID FILLED ORAL DAILY
Status: DISCONTINUED | OUTPATIENT
Start: 2017-12-03 | End: 2017-12-04 | Stop reason: HOSPADM

## 2017-12-03 RX ORDER — METOPROLOL TARTRATE 25 MG/1
25 TABLET, FILM COATED ORAL ONCE
Status: COMPLETED | OUTPATIENT
Start: 2017-12-03 | End: 2017-12-03

## 2017-12-03 RX ORDER — METOPROLOL TARTRATE 50 MG
50 TABLET ORAL 2 TIMES DAILY
Status: DISCONTINUED | OUTPATIENT
Start: 2017-12-03 | End: 2017-12-04 | Stop reason: HOSPADM

## 2017-12-03 RX ORDER — ESCITALOPRAM OXALATE 5 MG/1
5 TABLET ORAL DAILY
Status: DISCONTINUED | OUTPATIENT
Start: 2017-12-03 | End: 2017-12-04 | Stop reason: HOSPADM

## 2017-12-03 RX ORDER — PRAVASTATIN SODIUM 10 MG
10 TABLET ORAL EVERY EVENING
Status: DISCONTINUED | OUTPATIENT
Start: 2017-12-03 | End: 2017-12-04 | Stop reason: HOSPADM

## 2017-12-03 RX ADMIN — METOPROLOL TARTRATE 50 MG: 50 TABLET ORAL at 20:56

## 2017-12-03 RX ADMIN — METOROPROLOL TARTRATE 2.5 MG: 5 INJECTION, SOLUTION INTRAVENOUS at 18:51

## 2017-12-03 RX ADMIN — ESCITALOPRAM 5 MG: 5 TABLET, FILM COATED ORAL at 08:13

## 2017-12-03 RX ADMIN — ZONISAMIDE 100 MG: 100 CAPSULE ORAL at 00:28

## 2017-12-03 RX ADMIN — APIXABAN 5 MG: 5 TABLET, FILM COATED ORAL at 20:57

## 2017-12-03 RX ADMIN — PRAVASTATIN SODIUM 10 MG: 10 TABLET ORAL at 20:57

## 2017-12-03 RX ADMIN — DRONEDARONE 400 MG: 400 TABLET, FILM COATED ORAL at 20:57

## 2017-12-03 RX ADMIN — ZONISAMIDE 100 MG: 100 CAPSULE ORAL at 22:57

## 2017-12-03 RX ADMIN — METOPROLOL TARTRATE 25 MG: 25 TABLET ORAL at 14:36

## 2017-12-03 RX ADMIN — METOROPROLOL TARTRATE 2.5 MG: 5 INJECTION, SOLUTION INTRAVENOUS at 08:51

## 2017-12-03 RX ADMIN — SODIUM CHLORIDE: 9 INJECTION, SOLUTION INTRAVENOUS at 08:13

## 2017-12-03 RX ADMIN — APIXABAN 5 MG: 5 TABLET, FILM COATED ORAL at 08:13

## 2017-12-03 RX ADMIN — METOPROLOL TARTRATE 25 MG: 25 TABLET ORAL at 08:13

## 2017-12-03 ASSESSMENT — ACTIVITIES OF DAILY LIVING (ADL)
ADLS_ACUITY_SCORE: 8

## 2017-12-03 ASSESSMENT — VISUAL ACUITY
OU: NORMAL ACUITY

## 2017-12-03 NOTE — PLAN OF CARE
Problem: Patient Care Overview  Goal: Plan of Care/Patient Progress Review  Outcome: Therapy, progress toward functional goals as expected   Discharge Planner OT   Patient plan for discharge: none stated, possibly home today  Current status: pt is SBA with mobility and BR transfers. Completed modified path finding with 100% accuracy speech seems a little improved but still garbled  Barriers to return to prior living situation: none  Recommendations for discharge: home with assist from family, OP SLP for language and further cog screen as needed, seems to be clearing  Rationale for recommendations: would benefit from OP therapies to return to PLOF       Entered by: Ana María Romero 12/03/2017 9:25 AM

## 2017-12-03 NOTE — CONSULTS
CARDIAC CONSULTATION      DATE OF SERVICE:  12/02/2017      REASON FOR CONSULTATION:  Atrial fibrillation with recent cardioembolic stroke.      HISTORY OF PRESENT ILLNESS:  Mrs. Daija Burgess is a 82-year-old female new to the [facility] who arrives following acute onset slurred speech and right-sided deficits suggestive of signs of stroke.  She has been hospitalized and was outside the window for the provision of TPA, and has had confirmatory imaging of acute left posterolateral infarct.  She has been seen by Neurology and has recently been engaged with oral anticoagulation with apixaban.  Given her atrial fibrillation history, a Cardiology consultation was undertaken.  Additional background history includes paroxysmal atrial fibrillation, for which she is chronically rhythm controlled with Multaq.  She has not engaged in chronic anticoagulation therapy due to personal preference.  Additional history includes obesity, hyperlipidemia, prior colon cancer, status post colectomy, and major depressive disorder.      In speaking with Mrs. Burgess, she denies a history of overt cardiac complaints.  She specifically denies spells of tachy palpitations.  While in atrial fibrillation, she does not perceive irregular or tachy palpitations.  She denies overt heart failure syndrome.  She denies chest pain syndrome.  She has not sustained syncope or presyncopal events.  She endorses that she has felt overall well until her stroke event.      Vital signs in the chart indicate a blood pressure of 142/74, pulse of 66, saturating acceptably well on room air, and afebrile.  Ins and outs are acceptable.      A 12-lead ECG demonstrates coarse atrial fibrillation with rapid ventricular response, possible anterior septal infarct versus low anterior forces present.  No other abnormal findings.  No findings suggestive of ischemia.  Positive 1 aberrantly conducted complex with a left bundle branch block pattern.       CBC is without abnormalities.  Hemoglobin is 14.0.  Creatinine is normal at 0.94.  Sodium and potassium are within acceptable limits of 140 and 3.9, respectively.  INR is 0.98.      There is no transthoracic echocardiogram available for my review.  There is no prior coronary angiogram or stress testing available for my review.      CTA imaging confirms a left posterolateral infarction without evidence of major vessel stenoses.  No hemorrhagic transformation was present.      SOCIAL HISTORY:  The patient does not use tobacco, alcohol or drugs.  She is retired and .  She lives independently.      FAMILY HISTORY:  No family history of stroke or cardiac disease.      REVIEW OF SYSTEMS:  Otherwise negative except as what is noted in the HPI.      PHYSICAL EXAMINATION:   GENERAL:  Well-appearing female, appears younger than stated age, obese, sensorium clear, speech forced and garbled on occasions.  Able to communicate freely, however.  Relaxed, nondyspneic.   HEENT:  Right facial slurring otherwise no major abnormalities.   LUNGS:  Clear to auscultation bilaterally.  Acceptable air movement.   CARDIOVASCULAR:  Irregular rhythm, rapid.  Variable S1.  Positive systolic flow murmur, loudest at the cardiac base.  No other murmurs appreciated.   SKIN:  No precordial heaves.     VESSELS:  JVP appears to be nonelevated.   EXTREMITIES:  Right lower extremity edema present (mild to moderate).  Trace left lower extremity edema.  Normal perfusion bilaterally.      IMPRESSION, REPORT, PLAN:   1.  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     2.  Paroxysmal atrial fibrillation, with current rapid ventricular rates, now on Eliquis. Chronic Multaq use.     3.  Hyperlipidemia.   4.  Obesity.     5.  Major depressive disorder.     6.  History of colon cancer, status post colectomy.        Mrs. Burgess has unfortunately sustained a left hemispheric stroke with persistent neurologic deficits.  We agree with the overall  assessment that it is likely cardioembolic, and we would especially recommend long-term anticoagulation in this setting.  Apixaban is a very good treatment option for her, and we agree with this approach.      As it pertains to her atrial fibrillation, she is asymptomatic at this time.  She is without evidence of cardiac decompensation.  She is, however, with rapid ventricular rates that does warrant better rate control.  I will provide 25 mg of metoprolol b.i.d. to start off rate control measures.  We will get a transthoracic echocardiogram to evaluate her overall myocardial function and/or presence of structural heart disease.  Depending on the echo, we may make alternative arrangements concerning Multaq antiarrhythmic versus an alternative agent.  Another consideration would be that, since she is without symptoms, she may not need long-term antiarrhythmic therapy at all.  I will await the results of the transthoracic echocardiogram to further this discussion with the patient.      Thank you for this consultation.  Will continue to follow along.         TYREE MCKEON MD             D: 2017 15:24   T: 2017 21:26   MT:       Name:     KEYANNA SEGOVIA   MRN:      [id number]        Account:       [id number]   :      1935           Consult Date:  2017      Document: [id number]

## 2017-12-03 NOTE — PROGRESS NOTES
Price check for Eliquis:    Patient would qualify for 1 month free and then price would be $60 per month.    12/3/2017, 11:32 AM  Explained above coverage for Eliquis to patient and she is in agreement to starting Eliquis and paying the above price.

## 2017-12-03 NOTE — PLAN OF CARE
Problem: Patient Care Overview  Goal: Plan of Care/Patient Progress Review  Outcome: No Change  2423-5430 Patient A/Ox4. VSS on RA. Patient denies pain, SOB, headache, nausea, numbness/tingling. Neuro's garbled speech, slight WFD, right facial droop. BG 81. Tele: A-fib CVR. IVF nacl 50ml/h. Tolerating regular diet. Up with SBA, steady gait. Voiding adequately. Plan: possible d/c 12/3Will continue to monitor

## 2017-12-03 NOTE — PLAN OF CARE
Problem: Patient Care Overview  Goal: Plan of Care/Patient Progress Review  Discharge Planner SLP   Patient plan for discharge: return home today or tomorrow   Current status: Pt tolerated thin liquids via straw and regular solid textures with no overt s/sx of aspiration. Recommend continue regular textures and thin liquids. Swallow goals met.   Barriers to return to prior living situation: dysarthria   Recommendations for discharge: OP ST for speech and language   Rationale for recommendations: pt with ongoing dysarthria; recommend completion of s/l eval upon d/c home with OP ST       Entered by: Lissett Velasquez 12/03/2017 2:09 PM

## 2017-12-03 NOTE — PROGRESS NOTES
[facility]    Cardiology Progress Note     Assessment & Plan   Daija Burgess is a 82 year old female who was admitted on 12/1/2017. Mrs Burgess has a longstanding history of Afib (on dronedarone chronically, was not on anticoagulation) who sustained a recent probable cardioembolic CVA.  Following her admission she developed atrial fibrillation with RVR.  Introducing beta-blockade for rate control. Long-term anticoagulation has been initiatied    Ventricular rates are still elevated.  Will increase beta-blockade dosing. Echo results are reassuring.  Will re-engaged dronedarone.    If rate control is unsucessful, will consider YAS guided cardioversion.    1.  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     2.  Paroxysmal atrial fibrillation, with current rapid ventricular rates, now on Eliquis. chronic Multaq use.     3.  Hyperlipidemia.   4.  Obesity.     5.  Major depressive disorder.     6.  History of colon cancer, status post colectomy.     - increasing metoprolol 50 mg BID  - restarting Multag  - continue Esperanzaquis    Tito Hayes MD    Interval History   Patient remains asymptomatic.  No acute overnight events.    Physical Exam   Temp: 97.9  F (36.6  C) Temp src: Oral BP: 137/77   Heart Rate: 86 Resp: 16 SpO2: 94 % O2 Device: None (Room air)    Vitals:    12/01/17 1526 12/01/17 1718   Weight: 90.7 kg (200 lb) 98.7 kg (217 lb 8 oz)     Vital Signs with Ranges  Temp:  [97.4  F (36.3  C)-98.3  F (36.8  C)] 97.9  F (36.6  C)  Heart Rate:  [] 86  Resp:  [16-18] 16  BP: (113-152)/(58-95) 137/77  SpO2:  [94 %-96 %] 94 %  I/O last 3 completed shifts:  In: 1864 [P.O.:440; I.V.:1424]  Out: 2900 [Urine:2900]  Patient Active Problem List   Diagnosis     Left-sided cerebrovascular accident (CVA) (H)       PHYSICAL EXAMINATION:   GENERAL:  Well-appearing female, appears younger than stated age, obese, sensorium clear, speech forced and garbled on occasions.  Able to communicate freely, however.   Relaxed, nondyspneic.   HEENT:  Right facial slurring otherwise no major abnormalities.   LUNGS:  Clear to auscultation bilaterally.  Acceptable air movement.   CARDIOVASCULAR:  Irregular rhythm, rapid.  Variable S1.  Positive systolic flow murmur, loudest at the cardiac base.  No other murmurs appreciated.   SKIN:  No precordial heaves.     VESSELS:  JVP is nonelevated.   EXTREMITIES:  Right lower extremity edema present (mild to moderate).  Trace left lower extremity edema.  Normal perfusion bilaterally.         Medications     - MEDICATION INSTRUCTIONS -       NaCl 50 mL/hr at 17 0813       docusate sodium (COLACE) capsule 100 mg  100 mg Oral Daily     escitalopram (LEXAPRO) tablet 5 mg  5 mg Oral Daily     pravastatin (PRAVACHOL) tablet 20 mg  20 mg Oral QPM     zonisamide (ZONEGRAN) capsule 100 mg  100 mg Oral At Bedtime     metoprolol  50 mg Oral BID     metoprolol  25 mg Oral Once     apixaban ANTICOAGULANT  5 mg Oral BID     sodium chloride (PF)  3 mL Intracatheter Q8H       Data   Results for orders placed or performed during the hospital encounter of 17 (from the past 24 hour(s))   EKG 12-lead, tracing only   Result Value Ref Range    Interpretation ECG Click View Image link to view waveform and result    Glucose by meter   Result Value Ref Range    Glucose 81 70 - 99 mg/dL   Glucose by meter   Result Value Ref Range    Glucose 87 70 - 99 mg/dL   Echocardiogram Complete    Narrative    [id number]  [facility]  [id number]  [id number]^MAY^TYREE^[facility]  Echocardiography Laboratory  [address] [place] [address]        Name: KEYANNA SEGOVIA  MRN: [id number]  : 1935  Study Date: 2017 10:31 AM  Age: 82 yrs  Gender: Female  Patient Location: [facility]  Reason For Study: Afib  Ordering Physician: TYREE MCKEON  Referring Physician: TYREE MCKEON  Performed By: Caity Mccarty RDCS     BSA: 2.0 m2  Height: 65 in  Weight: 217 lb  HR: 97  BP: 142/74  mmHg  _____________________________________________________________________________  __        Procedure  Complete Echo Adult.  _____________________________________________________________________________  __        Interpretation Summary     The rhythm was atrial flutter.  The left ventricle is normal in size.  Left ventricular systolic function is normal.  The visual ejection fraction is estimated at 55-60%.  Normal left ventricular wall motion  The left atrium is mildly dilated.  There is no comparison study available.  _____________________________________________________________________________  __        Left Ventricle  The left ventricle is normal in size. There is normal left ventricular wall  thickness. Left ventricular systolic function is normal. The visual ejection  fraction is estimated at 55-60%. Normal left ventricular wall motion.     Right Ventricle  The right ventricle is normal in structure, function and size.     Atria  The left atrium is mildly dilated. Right atrial size is normal. There is no  atrial shunt seen.     Mitral Valve  The mitral valve is normal in structure and function. There is trace mitral  regurgitation.        Tricuspid Valve  The tricuspid valve is normal in structure and function. There is trace  tricuspid regurgitation. Right ventricular systolic pressure could not be  approximated due to inadequate tricuspid regurgitation. Normal IVC (1.5-2.5cm)  with >50% respiratory collapse; right atrial pressure is estimated at 5-  10mmHg.     Aortic Valve  The aortic valve is normal in structure and function. No aortic regurgitation  is present. No aortic stenosis is present.     Pulmonic Valve  The pulmonic valve is not well seen, but is grossly normal. There is trace  pulmonic valvular regurgitation.     Vessels  Normal size aorta. The IVC is normal in size and reactivity with respiration,  suggesting normal central venous pressure.     Pericardium  There is no pericardial  effusion.        Rhythm  The rhythm was atrial flutter.  _____________________________________________________________________________  __  MMode/2D Measurements & Calculations  IVSd: 1.1 cm     LVIDd: 3.9 cm  LVIDs: 3.3 cm  LVPWd: 1.1 cm  FS: 16.5 %  EDV(Teich): 67.3 ml  ESV(Teich): 43.7 ml  LV mass(C)d: 138.8 grams  LV mass(C)dI: 67.8 grams/m2  Ao root diam: 3.2 cm  LA dimension: 3.4 cm  asc Aorta Diam: 3.4 cm  LA/Ao: 1.1  LVOT diam: 2.1 cm  LVOT area: 3.6 cm2  LA Volume (BP): 69.8 ml  LA Volume Index (BP): 34.0 ml/m2  RWT: 0.56           Doppler Measurements & Calculations  MV E max tucker: 90.9 cm/sec  MV dec time: 0.22 sec  E/E' avg: 10.8  Lateral E/e': 11.8  Medial E/e': 9.9           _____________________________________________________________________________  __           Report approved by: Rabia Urban 12/03/2017 12:03 PM          Recent Labs  Lab 12/02/17  0128 12/01/17  1903 12/01/17  1535   WBC  --   --  7.5   HGB  --   --  14.0   MCV  --   --  93   PLT  --   --  209   INR  --   --  0.98   NA  --   --  140   POTASSIUM  --   --  3.9   CHLORIDE  --   --  108   CO2  --   --  24   BUN  --   --  16   CR  --   --  0.94   ANIONGAP  --   --  8   FAINA  --   --  9.1   GLC  --   --  144*   TROPI <0.015 <0.015  --      No results found for this or any previous visit (from the past 24 hour(s)).

## 2017-12-03 NOTE — PLAN OF CARE
Problem: Patient Care Overview  Goal: Plan of Care/Patient Progress Review  SLP: Attempted to see pt for dysphagia tx, however pt busy receiving echo. RN reports good tolerance of current diet level. Pt may d/c home today, would recommend OP ST for speech and language. Will follow up as appropriate.

## 2017-12-03 NOTE — PLAN OF CARE
Problem: Patient Care Overview  Goal: Plan of Care/Patient Progress Review  Outcome: No Change  Pt alert and oriented x4. Tele afib with RVR - -130s in a.m. PRN IV metoprolol administered x1. Cardiology increased scheduled dose and 1x oral dose (25 mg) administered for HR in 120s at 1430. VS otherwise stable. Neuros stable with L facial droop and garbled/slurred speech. CMS intact. HYATT equally. Tolerating regular diet. Loose stools x1. Ambulated in room with SBA. Plan to d/c to family when HR better controlled. IVF d/eric per neurology.

## 2017-12-03 NOTE — PLAN OF CARE
Problem: Patient Care Overview  Goal: Plan of Care/Patient Progress Review  Outcome: Improving  A&O x4. Neuros intact except garbled speech and WFD. VSS. Tele NSR/AFIB RVR (later afternoon)Pt has hx of intermittent afib. Regular diet. Up SBA. Voiding adequately. +BM.Denies pain.  Cards consulted- BID metoprolol started.  Speech/OT following. Discharge pending progress. Echo to be completed. Continue to monitor.

## 2017-12-03 NOTE — PROGRESS NOTES
"Neurology Progress Note          Assessment and Plan:     Left-sided cerebrovascular accident (CVA) (H)    Atrial fibrillation    Continue Eliquis. Continue rehabilitation effort. Agree with cardiology input. IVF can be discontinued after the ECHO if patient is having adequate po. Discussed with patient. At this point neurology will sign off. Call if questions.               Interval History:   Continues to have mild aphasia and dysarthria. Initiated apixaban, tolerating well. Cardiology consulted, Multaq held, now on low dose propranolol for rate control.              Review of Systems:   The 5 point Review of Systems is negative other than noted in the HPI             Medications:       docusate sodium (COLACE) capsule 100 mg  100 mg Oral Daily     escitalopram (LEXAPRO) tablet 5 mg  5 mg Oral Daily     pravastatin (PRAVACHOL) tablet 20 mg  20 mg Oral QPM     zonisamide (ZONEGRAN) capsule 100 mg  100 mg Oral At Bedtime     apixaban ANTICOAGULANT  5 mg Oral BID     metoprolol  25 mg Oral BID     sodium chloride (PF)  3 mL Intracatheter Q8H     naloxone, - MEDICATION INSTRUCTIONS -, acetaminophen, ondansetron **OR** ondansetron, labetalol, hydrALAZINE, metoprolol, sodium chloride (PF)               Physical Exam:   Vitals were reviewed  Blood pressure (P) 113/79, pulse 66, temperature 98.3  F (36.8  C), temperature source Oral, resp. rate 16, height 1.651 m (5' 5\"), weight 98.7 kg (217 lb 8 oz), SpO2 94 %.  Wt Readings from Last 4 Encounters:   17 98.7 kg (217 lb 8 oz)     Temperatures:  Current - Temp: 98.3  F (36.8  C); Max - Temp  Av.9  F (36.6  C)  Min: 97.4  F (36.3  C)  Max: 98.3  F (36.8  C)  Blood pressure range: Systolic (24hrs), Av , Min:113 , Max:152   ; Diastolic (24hrs), Av, Min:58, Max:95    I/O last 3 completed shifts:  In: 1864 [P.O.:440; I.V.:1424]  Out: 2900 [Urine:2900]  Mental Status Exam:   Level of Alertness:   awake  Orientation:   normal to person, place, time  Memory:   " normal (3/3)  Fund of Knowledge:  normal  Attention/Concentration:  normal  Language:  Slurred with word finding difficulty; naming, reading, repetition, commands are normal.  Cranial Nerves: Visual fields: full; EOM: intact; Pupils: PERRL; Fundi: No papilledema; V, VII: no facial asymmetry to sensory, very slight right facial weakness; tongue, palate: midline; shoulder and neck muscle strength: symmetric. Moderate dysarthria.  Motor Exam:  moves all extremities well and symmetrically.  Sensory:  sensory intact to light touch/vibration.  Coordination: finger/Nose:  right:  normal; left:  Normal; heel-Knee-Shin:  right:  normal; left:  normal; rapid Alternating Movements:  right:  normal; left:  normal.  Gait: independent, slightly waddling.  Deep Tendon Reflexes:  reflexes are hypoactive and symmetrical, except for a reduced left KJ compared to right; plantar response:  Right: flexor; left: flexor.                   Data:   All laboratory and imaging data reviewed by me:  Results for orders placed or performed during the hospital encounter of 12/01/17 (from the past 24 hour(s))   EKG 12-lead, tracing only   Result Value Ref Range    Interpretation ECG Click View Image link to view waveform and result    Glucose by meter   Result Value Ref Range    Glucose 81 70 - 99 mg/dL   Glucose by meter   Result Value Ref Range    Glucose 87 70 - 99 mg/dL

## 2017-12-03 NOTE — PROGRESS NOTES
[facility]    Hospitalist Progress Note    Date of Service (when I saw the patient): 12/02/2017    Assessment & Plan    This is an 82-year-old female with past medical history of atrial fibrillation, on aspirin and Multaq, also vertigo, remote history of colon cancer and depression.  Presented 12/1 for evaluation of word finding difficulties, slurred speech, and right-sided weakness.      PLAN:   1.  Left frontal lobe CVA; stable and improving.  Suspect this is probably in the language center.   Her CHADS2-VASc score is above 2.   - Appreciate eval. By Neurology today.  - Cardiology consult re. A-fib; reviewed w/ on-call and note addition of metoprolol.  - continue monitoring on telemetry.  - appreciate SLP eval. Today; note diet has been advanced; resume PTA PO meds (except for ASA).    2.  Hypertension; pressures stable.  - continue PRN IV labetalol and hydralazine; will continue with permissive blood pressures up to 200 systolic and 100 diastolic.     3.  Atrial fibrillation, rate stable.  Reviewed w/ Dr. Tito Hayes today - appreciate his input.  - check EKG (done); continue telemetry; add metoprolol.  - hold off on Multaq for now; will need further f/u w/ Cardiology shortly after discharge (Daija will be staying in [place] for at least the next 1-2 weeks, before returning to [place])  - PRN IV metoprolol available    4.  Depression; euthymic and cheerful when I see her.  - resume PTA escitalopram    5. Hyperglycemia with elevated A1C; may have pre-diabetes or mild DM.  - glucose testing Qshift; re-eval. 12/3.     DVT prophylaxis:  PCDs due to risk of possible hemorrhagic conversion.       CODE STATUS: Full Code      DISPOSITION:  Anticipate 2-3 days for possible workup and safe disposition planning.      NICOLE Pink MD, FACP   Internal Medicine Hospitalist        Interval History   No new problems; still having some difficulty with word finding, but speech less slurred.  Was up walking at least once; no  problems with balance.  Able to eat/feed herself without difficulty.  No headache or vertigo Sx at present, but asking to have her PRN zonisamide resumed.  Remainder of ROS negative.    -Data reviewed today: I reviewed all new labs and imaging results over the last 24 hours. I personally reviewed no images or EKG's today.    Physical Exam   Temp: 97.7  F (36.5  C) Temp src: Oral BP: 141/58   Heart Rate: 121 Resp: 16 SpO2: 95 % O2 Device: None (Room air)    Vitals:    12/01/17 1526 12/01/17 1718   Weight: 90.7 kg (200 lb) 98.7 kg (217 lb 8 oz)     Vital Signs with Ranges  Temp:  [97.5  F (36.4  C)-98.2  F (36.8  C)] 97.7  F (36.5  C)  Heart Rate:  [] 121  Resp:  [16-18] 16  BP: (139-154)/(58-82) 141/58  SpO2:  [92 %-97 %] 95 %  I/O last 3 completed shifts:  In: 403 [I.V.:403]  Out: 1550 [Urine:1550]    Constitutional: No acute distress; lying in bed.  HEENT: AT/NC; sclerae clear; EOMI. MM's moist.  Respiratory: Good air entry bilaterally, no wheezing or rhonchi  Cardiovascular: S1, S2 present, irregular rate and rhythm; no murmur, rubs or gallops  GI: obese; soft, positive bowel sounds, non-tender, non-distended  Skin/Integumen: no edema, no cyanosis, no rashes  Neurologic: awake, conversant, jovial; follows directions well. Speech sl. Slurred at times, and with some difficulty finding words.  Please see Neurology note from earlier today for exam at that time.     Medications     - MEDICATION INSTRUCTIONS -       NaCl 50 mL/hr at 12/02/17 1824       apixaban ANTICOAGULANT  5 mg Oral BID     metoprolol  25 mg Oral BID     sodium chloride (PF)  3 mL Intracatheter Q8H       Data     Recent Labs  Lab 12/02/17  0128 12/01/17  1903 12/01/17  1535   WBC  --   --  7.5   HGB  --   --  14.0   MCV  --   --  93   PLT  --   --  209   INR  --   --  0.98   NA  --   --  140   POTASSIUM  --   --  3.9   CHLORIDE  --   --  108   CO2  --   --  24   BUN  --   --  16   CR  --   --  0.94   ANIONGAP  --   --  8   FAINA  --   --  9.1    GLC  --   --  144*   TROPI <0.015 <0.015  --        Recent Results (from the past 24 hour(s))   MRI Brain w & w/o contrast    Narrative    MR BRAIN W/O & W CONTRAST  12/1/2017 9:44 PM     HISTORY:  Acute CVI;     TECHNIQUE: Multiplanar, multisequence MRI of the brain without and  with 9mL gadavist IV contrast material.    COMPARISON: CT studies dated 12/1/2017.    FINDINGS:  Diffusion-weighted images reveal area of restricted  diffusion in the lateral cortex of the left posterior frontal lobe.  This area of restricted diffusion measures approximately 3 cm in  transverse dimension by 1 cm in AP dimension. This area of restricted  diffusion is consistent with an acute cortical infarct..  There is no  hemorrhage or mass effect.. There are no gadolinium enhancing lesions.   The facial structures appear normal.  The arteries at the base of the  brain and the dural venous sinuses appear patent.      Impression    IMPRESSION: Study is consistent with an acute infarct in the lateral  cortex of the left frontal lobe.     CYNTHIA NOGUERA MD   CT Head w/o Contrast    Narrative    CT SCAN OF THE HEAD WITHOUT CONTRAST   12/2/2017 9:55 AM     HISTORY: Follow-up cerebrovascular incident.      TECHNIQUE: Axial images of the head and coronal reformations without  IV contrast material.  Radiation dose for this scan was reduced using  automated exposure control, adjustment of the mA and/or kV according  to patient size, or iterative reconstruction technique.    COMPARISON: MR dated 12/1/2017.    FINDINGS: The left posterolateral acute frontal ischemic infarct seen  on MR is not as readily appreciated on CT. There is no evidence for  any hemorrhagic transformation. Mild cerebral atrophy is present along  with some minimal nonspecific white matter changes. There is no  evidence for hydrocephalus, mass effect, or skull fracture.      Impression    IMPRESSION:  1. No evidence for hemorrhagic transformation of posterolateral left  frontal  infarct.  2. Mild cerebral atrophy with minimal nonspecific white matter  changes.    SAI ESTRADA MD

## 2017-12-03 NOTE — PLAN OF CARE
Problem: Stroke (Ischemic) (Adult)  Goal: Signs and Symptoms of Listed Potential Problems Will be Absent, Minimized or Managed (Stroke)  Signs and symptoms of listed potential problems will be absent, minimized or managed by discharge/transition of care (reference Stroke (Ischemic) (Adult) CPG).   Outcome: Improving  A&O x4. Neuros: garbled speech and WFD, R facial droop. VSS. Tele Afib CVR. Reg diet. Up with SBA. Denies pain. Plan d/c pending, nrsng cont to monitor.

## 2017-12-03 NOTE — PROGRESS NOTES
"[facility]    Hospitalist Progress Note    Date of Service (when I saw the patient): 12/03/2017    Assessment & Plan    This is an 82-year-old female with past medical history of atrial fibrillation, on aspirin and Multaq, also vertigo, remote history of colon cancer and depression.  Presented 12/1 for evaluation of word finding difficulties, slurred speech, and right-sided weakness.      PLAN:   1.  Left frontal lobe CVA; stable and improving.  Her CHADS2-VASc score is above 2.   - Appreciate eval. By Neurology today.  - Appreciate Cardiology f/u re. A-fib  - continue monitoring on telemetry.  - adjust Pravastatin dose per Pharm. rec's.    2.  Hypertension; pressures stable.  - continue PRN IV labetalol and hydralazine; will continue with permissive blood pressures up to 200 systolic and 100 diastolic.   - increase metoprolol and continue current monitoring    3.  Atrial fibrillation, rate in 130's earlier.  Appreciate f/u by Dr. Tito Hayes.  - continue telemetry; increase metoprolol per Dr. Hayes  - resume Multaq  - PRN IV metoprolol available    4.  Depression; euthymic and cheerful when I see her.  - continue PTA escitalopram    5. Hyperglycemia with elevated A1C; may have pre-diabetes or mild DM.  - glucose testing Qshift; re-eval. 12/3.    6. H/O vertigo; no new Sx.  - continue zonisamide dosing     DVT prophylaxis:  PCDs due to risk of possible hemorrhagic conversion.       CODE STATUS: Full Code      DISPOSITION:  Anticipate 1-2 days; likely to her daughter's house.      NICOLE Pink MD, FACP   Internal Medicine Hospitalist        Interval History   No new problems; still having some difficulty with slurred speech/word finding; a bit frustrated by this - says \"I'm a very independent person\"; also upset about having new medications (metoprolol, Multaq), but willing to comply. Up walking at least once today; no problems.  Able to eat/feed herself without difficulty.  No headache or vertigo Sx;  " Remainder of ROS negative.    -Data reviewed today: I reviewed all new labs and imaging results over the last 24 hours. I personally reviewed labs and ECHO report (see below).    Physical Exam   Temp: 98  F (36.7  C) Temp src: Oral BP: 118/74   Heart Rate: 69 Resp: 16 SpO2: 90 % O2 Device: None (Room air)    Vitals:    12/01/17 1526 12/01/17 1718   Weight: 90.7 kg (200 lb) 98.7 kg (217 lb 8 oz)     Vital Signs with Ranges  Temp:  [97.4  F (36.3  C)-98.3  F (36.8  C)] 98  F (36.7  C)  Heart Rate:  [] 69  Resp:  [16-18] 16  BP: (113-152)/(70-95) 118/74  SpO2:  [90 %-96 %] 90 %  I/O last 3 completed shifts:  In: 2294 [P.O.:440; I.V.:1854]  Out: 2600 [Urine:2600]    Constitutional: No acute distress; walking in [place], then sitting in chair.  HEENT: AT/NC; sclerae clear; EOMI. MM's moist.  Mild Right facial droop/weakness.  Respiratory: Good air entry bilaterally, no wheezing or rhonchi  Cardiovascular: S1, S2 present, irregular rate and rhythm; no murmur, rubs or gallops  GI: obese; soft, positive bowel sounds, non-tender, non-distended  Skin/Integumen: no edema, no cyanosis, no rashes  Neurologic: awake, conversant, pleasant; follows directions well. Speech sl. Slurred at times, and with some difficulty finding words.  Please see Neurology note from earlier today for exam at that time.     Medications     - MEDICATION INSTRUCTIONS -         docusate sodium (COLACE) capsule 100 mg  100 mg Oral Daily     escitalopram (LEXAPRO) tablet 5 mg  5 mg Oral Daily     zonisamide (ZONEGRAN) capsule 100 mg  100 mg Oral At Bedtime     metoprolol  50 mg Oral BID     dronedarone  400 mg Oral BID w/meals     pravastatin (PRAVACHOL) tablet 10 mg  10 mg Oral QPM     apixaban ANTICOAGULANT  5 mg Oral BID     sodium chloride (PF)  3 mL Intracatheter Q8H       Data     Recent Labs  Lab 12/02/17  0128 12/01/17  1903 12/01/17  1535   WBC  --   --  7.5   HGB  --   --  14.0   MCV  --   --  93   PLT  --   --  209   INR  --   --  0.98   NA   --   --  140   POTASSIUM  --   --  3.9   CHLORIDE  --   --  108   CO2  --   --  24   BUN  --   --  16   CR  --   --  0.94   ANIONGAP  --   --  8   FAINA  --   --  9.1   GLC  --   --  144*   TROPI <0.015 <0.015  --        No results found for this or any previous visit (from the past 24 hour(s)).     Echocardiogram Complete   Status:  Edited Result - FINAL   Visible to patient:  No (Not Released) Next appt:  2017 at 05:00 AM in Speech Therapy (Geraldine Harris, SLP) Order: [id number]     Details      Reading Physician Reading Date Result Priority     Haja Sheriff MD 12/3/2017          Narrative           [id number]  [facility]  [id number]  [id number]^MAY^TYREE^DOV        [facility]  Echocardiography Laboratory  [address] [place] [address]        Name: KEYANNA SEGOVIA  MRN: [id number]  : 1935  Study Date: 2017 10:31 AM  Age: 82 yrs  Gender: Female  Patient Location: [facility]  Reason For Study: Afib  Ordering Physician: TYREE MCKEON  Referring Physician: TYREE MCKEON  Performed By: Caity Mccarty RDCS     BSA: 2.0 m2  Height: 65 in  Weight: 217 lb  HR: 97  BP: 142/74 mmHg  _____________________________________________________________________________  Procedure  Complete Echo Adult.  _____________________________________________________________________________  Interpretation Summary     The rhythm was atrial flutter.  The left ventricle is normal in size.  Left ventricular systolic function is normal.  The visual ejection fraction is estimated at 55-60%.  Normal left ventricular wall motion  The left atrium is mildly dilated.  There is no comparison study available.

## 2017-12-04 ENCOUNTER — APPOINTMENT (OUTPATIENT)
Dept: OCCUPATIONAL THERAPY | Facility: CLINIC | Age: 82
DRG: 066 | End: 2017-12-04
Payer: MEDICARE

## 2017-12-04 ENCOUNTER — APPOINTMENT (OUTPATIENT)
Dept: SPEECH THERAPY | Facility: CLINIC | Age: 82
DRG: 066 | End: 2017-12-04
Payer: MEDICARE

## 2017-12-04 VITALS
HEIGHT: 65 IN | BODY MASS INDEX: 36.24 KG/M2 | OXYGEN SATURATION: 93 % | SYSTOLIC BLOOD PRESSURE: 119 MMHG | HEART RATE: 66 BPM | TEMPERATURE: 97.5 F | RESPIRATION RATE: 16 BRPM | WEIGHT: 217.5 LBS | DIASTOLIC BLOOD PRESSURE: 64 MMHG

## 2017-12-04 LAB
CREAT SERPL-MCNC: 0.94 MG/DL (ref 0.52–1.04)
DEPRECATED CALCIDIOL+CALCIFEROL SERPL-MC: 15 UG/L (ref 20–75)
GFR SERPL CREATININE-BSD FRML MDRD: 57 ML/MIN/1.7M2
GLUCOSE BLDC GLUCOMTR-MCNC: 103 MG/DL (ref 70–99)

## 2017-12-04 PROCEDURE — 99232 SBSQ HOSP IP/OBS MODERATE 35: CPT | Performed by: INTERNAL MEDICINE

## 2017-12-04 PROCEDURE — A9270 NON-COVERED ITEM OR SERVICE: HCPCS | Mod: GY | Performed by: PSYCHIATRY & NEUROLOGY

## 2017-12-04 PROCEDURE — A9270 NON-COVERED ITEM OR SERVICE: HCPCS | Mod: GY | Performed by: INTERNAL MEDICINE

## 2017-12-04 PROCEDURE — 82565 ASSAY OF CREATININE: CPT | Performed by: INTERNAL MEDICINE

## 2017-12-04 PROCEDURE — 25000132 ZZH RX MED GY IP 250 OP 250 PS 637: Mod: GY | Performed by: INTERNAL MEDICINE

## 2017-12-04 PROCEDURE — 40000225 ZZH STATISTIC SLP WARD VISIT: Performed by: SPEECH-LANGUAGE PATHOLOGIST

## 2017-12-04 PROCEDURE — 99239 HOSP IP/OBS DSCHRG MGMT >30: CPT | Performed by: INTERNAL MEDICINE

## 2017-12-04 PROCEDURE — 97535 SELF CARE MNGMENT TRAINING: CPT | Mod: GO | Performed by: OCCUPATIONAL THERAPY ASSISTANT

## 2017-12-04 PROCEDURE — 25000132 ZZH RX MED GY IP 250 OP 250 PS 637: Mod: GY | Performed by: PSYCHIATRY & NEUROLOGY

## 2017-12-04 PROCEDURE — 36415 COLL VENOUS BLD VENIPUNCTURE: CPT | Performed by: INTERNAL MEDICINE

## 2017-12-04 PROCEDURE — 92610 EVALUATE SWALLOWING FUNCTION: CPT | Mod: GN | Performed by: SPEECH-LANGUAGE PATHOLOGIST

## 2017-12-04 PROCEDURE — 40000133 ZZH STATISTIC OT WARD VISIT: Performed by: OCCUPATIONAL THERAPY ASSISTANT

## 2017-12-04 PROCEDURE — 00000146 ZZHCL STATISTIC GLUCOSE BY METER IP

## 2017-12-04 RX ORDER — ESCITALOPRAM OXALATE 5 MG/1
5 TABLET ORAL DAILY
Qty: 60 TABLET | Refills: 0 | Status: SHIPPED | OUTPATIENT
Start: 2017-12-04 | End: 2017-12-08

## 2017-12-04 RX ORDER — METOPROLOL TARTRATE 50 MG
50 TABLET ORAL 2 TIMES DAILY
Qty: 120 TABLET | Refills: 0 | Status: SHIPPED | OUTPATIENT
Start: 2017-12-04 | End: 2017-12-04

## 2017-12-04 RX ORDER — PRAVASTATIN SODIUM 10 MG
20 TABLET ORAL EVERY EVENING
Qty: 120 TABLET | Refills: 0 | Status: SHIPPED | OUTPATIENT
Start: 2017-12-04

## 2017-12-04 RX ORDER — METOPROLOL TARTRATE 50 MG
25 TABLET ORAL 2 TIMES DAILY
Qty: 120 TABLET | Refills: 0 | Status: SHIPPED | OUTPATIENT
Start: 2017-12-04 | End: 2017-12-08

## 2017-12-04 RX ADMIN — DRONEDARONE 400 MG: 400 TABLET, FILM COATED ORAL at 08:53

## 2017-12-04 RX ADMIN — METOPROLOL TARTRATE 50 MG: 50 TABLET ORAL at 08:54

## 2017-12-04 RX ADMIN — APIXABAN 5 MG: 5 TABLET, FILM COATED ORAL at 08:53

## 2017-12-04 RX ADMIN — ESCITALOPRAM 5 MG: 5 TABLET, FILM COATED ORAL at 08:53

## 2017-12-04 ASSESSMENT — ACTIVITIES OF DAILY LIVING (ADL)
ADLS_ACUITY_SCORE: 8

## 2017-12-04 ASSESSMENT — VISUAL ACUITY
OU: NORMAL ACUITY

## 2017-12-04 NOTE — PROGRESS NOTES
12/04/17 1042   General Information, SLP   Type of Evaluation Speech and Language;Dysarthria   Type of Visit Initial   Start of Care Date 12/04/17   Onset of Illness/Injury or Date of Surgery - Date 12/01/17   Referring Physician Dr. Medellin   Patient/Family Goals Statement Patient wants to go home.    Pertinent History of Current Problem This is an 82-year-old female with past medical history of atrial fibrillation, on aspirin and Multaq, also vertigo, remote history of colon cancer and depression.  She began having neurological deficits at 6:30 a.m. to include word finding difficulties, had progressed to full slurred speech, some right-sided weakness which seems to be resolving but speech deficits are worsening.  She is being admitted for further workup and treatment for suspected left hemispheric stroke.  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    Precautions/Limitations fall precautions;sternal precautions   General Observations Pleasant and cooperative.    Oral Motor Sensory Function   Completed on Swallow Evaluation Completed Clinical Bedside Swallow Evaluation   Speech   Deficits in Speech Respiration Abdominal   Deficits in Phonation Loudness (soft)   Deficits in Articulation Flaccid dysarthria  (Mixed dysarthria)   Deficits in Resonance None   Deficits in [place] Disordered intonation patterns   Speech Comments Mild to moderate dysarthria characterized by articulatory  imprecision and slow rate.    Language: Auditory Comprehension (understanding of spoken language)   Tests were administered at the following levels Complex (vocation/community/social activities)   Paragraph; Discourse Comprehension Test (out of 8 total; less than 7 is below mean) 5   Functional Assessment Scale (Auditory Comprehension) Mild Impairment   Comments (Auditory Comprehension) Length and complexity comprehension decreases.    Language: Verbal Expression (use of spoken language to express  information)   Tests were administered at the following levels Complex (vocation/community/social activities)   Generative Naming; Cognitive Linguistic Quick Test Result Below mean   Functional Assessment Scale (Verbal Expression) Minimal Impairment   Comments (Verbal Expression) Slightly below the mean for her age. No apparent word finding noted in conversation.    Reading Comprehension (understanding of written language)   Comments (Reading Comprehension) Did not complete due to HA with vestibular issues when reading.    Cognitive Status Examination   Attention intact   Behavioral Observations alert   Reasoning intact  (At the basic to moderate level. )   Organization (Able to verbally sequence 4 step task. )   General Therapy Interventions   Planned Therapy Interventions Communication;Language   Language Auditory comprehension   Communication Improve speech intelligibility   Clinical Impression, SLP Eval   Criteria for Skilled Therapeutic Interventions Met Yes   SLP Diagnosis Mild to moderate dysarthria.    Functional limitations due to impairments Decrease speech intelligibility.   Rehab Potential Good, to achieve stated therapy goals   Therapy Frequency Daily   Predicted Duration of Therapy Intervention (days/wks) 3 days   Anticipated Discharge Disposition Home with Outpatient Therapy   Risks and Benefits of Treatment have been explained. Yes   Patient, Family & other staff in agreement with plan of care Yes   Clinical Impression Comments Patient presents with minimal to mild language deficits for auditory comprehension at the complex level and verbal language for word fluency. Mild to moderate dysarthria characterized by articulatory imprecision and slow rate.    Total Evaluation Time      Total Evaluation Time (Minutes) 20

## 2017-12-04 NOTE — PLAN OF CARE
Problem: Patient Care Overview  Goal: Plan of Care/Patient Progress Review  Discharge Planner OT   Patient plan for discharge: none stated  Current status: Pt completed bed mobility supine to sit EOB independent, SBA amb without AD to/from bathroom, toilet transfer with clothing management independent. Pt declined further activity due to lunch coming and wanting to watch her granddaughter on TV news.    Barriers to return to prior living situation: none  Recommendations for discharge:  home with assist from family, OP SLP for language and further cog screen as needed,per plan established by the Occupational Therapist  Rationale for recommendations: pt will have family assist as needed and will follow up with OP SLP       Entered by: Shu Malik 12/04/2017 11:52 AM      Pt discharging home today with family assist, will follow up with OP SLP, GOALS PARTIALLY MET, see discharge summary

## 2017-12-04 NOTE — PLAN OF CARE
Problem: Patient Care Overview  Goal: Plan of Care/Patient Progress Review  Outcome: No Change  A&Ox4. Neuros, Left facial droop, speech garbled and slurred at times.  VS tachy at times, Tele A Fib with RVR. Overnight pt heart rate and BP within parameters no PRN medications given. Regular diet. Pt had reports of 2 loose stools during day shift, per pt she has chronic diarrhea, no BM overnight. SBA, ambulates to bathroom, voiding adequately. Cardiology following, plan to DC to family.

## 2017-12-04 NOTE — PLAN OF CARE
Problem: Patient Care Overview  Goal: Plan of Care/Patient Progress Review  Occupational Therapy Discharge Summary    Reason for therapy discharge:    Discharged to home.    Progress towards therapy goal(s). See goals on Care Plan in [facility] electronic health record for goal details.  Goals partially met.  Barriers to achieving goals:   discharge from facility.    Therapy recommendation(s):    No further therapy is recommended.

## 2017-12-04 NOTE — PLAN OF CARE
Problem: Patient Care Overview  Goal: Plan of Care/Patient Progress Review  Discharge Planner SLP   Patient plan for discharge: Home with OP.  Current status: Speech and language evaluation completed. Patient presents with minimal to mild language deficits for auditory comprehension at the complex level and verbal language for word fluency. Mild to moderate dysarthria characterized by articulatory imprecision and slow rate. Patient is self monitoring and correcting. Recommend: 1. Daily speech/language treatment focusing on intelligibility.   Barriers to return to prior living situation: Acuity of her illness.  Recommendations for discharge: Home with OP speech therapy.  Rationale for recommendations: Patient patient is motivated to improve her speech intelligibly and will benefit from OP speech therapy.        Entered by: Geraldine Harris 12/04/2017 12:50 PM

## 2017-12-04 NOTE — DISCHARGE INSTRUCTIONS
Follow-up with a neurologist and cardiologist once you return to [place]. Your PCP can help you set this up.     Your risk factors for stroke or TIA (transient ischemic attack):    Your Risk Factors Your Results Normal Ranges   High blood pressure BP Readings from Last 1 Encounters:   12/04/17 119/64    Less than 120/80   Cholesterol              Total Lab Results   Component Value Date    CHOL 137 12/01/2017      Less than 150    Triglycerides   Lab Results   Component Value Date    TRIG 93 12/01/2017    Less than 150   LDL Lab Results   Component Value Date    LDL 53 12/01/2017       Less than 70   HDL Lab Results   Component Value Date    HDL 65 12/01/2017            Greater than 40 (men)  Greater than 50 (women)   Diabetes   Recent Labs  Lab 12/01/17  1535   *    Fasting blood glucose    Smoking/tobacco use  Quit smoking and tobacco   Overweight  Lose 1-2 pounds a week   Lack of exercise  30 minutes moderate activity each day   Other risk factors include carotid (neck) artery disease, atrial fibrillation and stress. You may be on new medicine to treat high blood pressure, cholesterol, diabetes or atrial fibrillation.    Understanding Stroke Booklet given to patient. Please refer to booklet for further information.    Stroke warning signs and symptoms - CALL 911 right away for:  - Sudden numbness or weakness in the face, arm or leg (often on one side of the body).  - Sudden confusion or trouble understanding what is going on.  - Sudden blurred or decreased vision in one or both eyes.  - Sudden trouble speaking, loss of balance, dizziness or problems with coordination.  - Sudden, severe headache for no reason.  - Fainting or seizures.  - Symptoms may go away then come back suddenly.

## 2017-12-04 NOTE — PROGRESS NOTES
[facility]    Cardiology Progress Note    Date of Service (when I saw the patient): 12/04/2017     Assessment & Plan   Daija Burgess is a 82 year old female who was admitted on 12/1/2017 who wad admitted with symptoms suggestive of signs of stroke.  She has been hospitalized and was outside the window for the provision of TPA, and has had confirmatory imaging of acute left posterolateral infarct. Oral anticoagulation with apixaban was started.  Her PMH  included paroxysmal atrial fibrillation, for which she is chronically rhythm controlled with Multaq.  She has not engaged in chronic anticoagulation therapy due to personal preference.      1. Atrial 'Fib- now back in SR with rate in 60's- on Multaq 400 mg bid and metoprolol 50 mg bid  Echo demonstrated normal finding with mild dilated left atrium. LV EF=55-60 %    PLAN  1. Reduce metoprolol to 25 mg bid  2. Continue with her chronic dose of Multaq 400mg bid  3. Since she will be in town for next 6 weeks will order a 24 hour holter monitor in 1-2 weeks with a follow-up appt with EP JETT.    Appointments made and reviewed with Patient      JUNE Penny, CNP    ATTESTATION:  Ms. Burgess was seen and examined. Agree with note and plan of care.   FRANSICO Sauer MD     Interval History    Feels tired no palpitations.    Physical Exam   Temp: 97.6  F (36.4  C) Temp src: Oral BP: 116/63   Heart Rate: 58 Resp: 16 SpO2: 93 % O2 Device: None (Room air)    Vitals:    12/01/17 1526 12/01/17 1718   Weight: 90.7 kg (200 lb) 98.7 kg (217 lb 8 oz)     Vital Signs with Ranges  Temp:  [97.5  F (36.4  C)-98.3  F (36.8  C)] 97.6  F (36.4  C)  Heart Rate:  [] 58  Resp:  [16-20] 16  BP: (114-149)/(63-86) 116/63  SpO2:  [90 %-96 %] 93 %  I/O last 3 completed shifts:  In: 470 [P.O.:40; I.V.:430]  Out: 600 [Urine:600]    PHYSICAL EXAMINATION:   GENERAL:  Well-appearing female, appears younger than stated age, obese, sensorium clear, speech forced and garbled  on occasions.  Able to communicate freely, however.   HEENT:  Right facial slurring otherwise no major abnormalities.   LUNGS:  Clear to auscultation bilaterally.  Acceptable air movement.   CARDIOVASCULAR:  Regular rate in the 50's.    VESSELS:  JVP is nonelevated.   EXTREMITIES:  Right lower extremity edema present (mild to moderate).  Trace left lower extremity edema.  Normal perfusion bilaterally    Medications     - MEDICATION INSTRUCTIONS -         docusate sodium (COLACE) capsule 100 mg  100 mg Oral Daily     escitalopram (LEXAPRO) tablet 5 mg  5 mg Oral Daily     zonisamide (ZONEGRAN) capsule 100 mg  100 mg Oral At Bedtime     metoprolol  50 mg Oral BID     dronedarone  400 mg Oral BID w/meals     pravastatin (PRAVACHOL) tablet 10 mg  10 mg Oral QPM     apixaban ANTICOAGULANT  5 mg Oral BID     sodium chloride (PF)  3 mL Intracatheter Q8H       Data   I personally reviewed Telemetry SR with rate in the 58  Results for orders placed or performed during the hospital encounter of 12/01/17 (from the past 24 hour(s))   Glucose by meter   Result Value Ref Range    Glucose 101 (H) 70 - 99 mg/dL   Glucose by meter   Result Value Ref Range    Glucose 103 (H) 70 - 99 mg/dL   Creatinine   Result Value Ref Range    Creatinine 0.94 0.52 - 1.04 mg/dL    GFR Estimate 57 (L) >60 mL/min/1.7m2    GFR Estimate If Black 69 >60 mL/min/1.7m2

## 2017-12-04 NOTE — PROGRESS NOTES
Pt's -140s (afib with RVR) for 19 min starting at 1839. BP stable, pt asymptomatic. PRN IV 2.5 metoprolol administered per order, HR decreased to low 100s. RN will continue to monitor.

## 2017-12-04 NOTE — PLAN OF CARE
Problem: Patient Care Overview  Goal: Plan of Care/Patient Progress Review  Outcome: Improving  Pt is A&O x4. Neuros intact except for weakness on R side and slight R facial droop. VSS. Tele is SR with PAC. Regular diet. Up with SBA. Denies pain. Plan is d/c home with daughter.

## 2017-12-04 NOTE — PROGRESS NOTES
Discussed dc plan with Guera White NP.  She will order a Holter monitor for dc and would like pt to have a f/u appt in 2 weeks with EP NP.  Contacted [facility] and appt made with Jessica Martínez for 12/18.  Pt will need a PCP as she is staying in [place] to recover for a couple months rather than dc back home to [place].  Will meet with pt.     Addendum:  Met w/ pt to discuss dc plan.  She lives alone in [place] but she has friends and family there as well.  She will be staying with her dtr who lives in [place] for now.  She needs a [place] PCP while she is here.  She would like to go to [facility] so have made an appt for her with Dr Davenport for 12/8.  Her PCP in [place] is Katie Figueroa.   Her cardiologist and her neurologist in [place] have both retired so she will need to get referrals from Dr Figueroa for both.  Will send pt with CD of her imaging and paper copy of her chart to bring home to [place] when she returns. Updated bedside RN.

## 2017-12-05 ENCOUNTER — CARE COORDINATION (OUTPATIENT)
Dept: CARDIOLOGY | Facility: CLINIC | Age: 82
End: 2017-12-05

## 2017-12-05 LAB — INTERPRETATION ECG - MUSE: NORMAL

## 2017-12-05 NOTE — PLAN OF CARE
Problem: Patient Care Overview  Goal: Plan of Care/Patient Progress Review  Speech Language Therapy Discharge Summary    Reason for therapy discharge:    Discharged to home with outpatient therapy.    Progress towards therapy goal(s). See goals on Care Plan in [facility] electronic health record for goal details.  Goals not met.  Barriers to achieving goals:   discharge from facility.    Therapy recommendation(s):    Continued therapy is recommended.  Rationale/Recommendations:  Continue ST for speech and language function to improve speech intelligibility. .

## 2017-12-05 NOTE — PROGRESS NOTES
Patient was evaluated by cardiology while inpatient for Afib. Called patient and left a voicemail to discuss any post hospital d/c questions/concerns she may have, review medication changes, and confirm f/u appts. RN confirmed with patient that she has an apt scheduled on 12/11/17 for a holter monitor and on 12/18/17 with JETT Jessica Martínez. Patient advised to call clinic with any cardiac related questions or concerns.

## 2017-12-08 ENCOUNTER — HOSPITAL ENCOUNTER (OUTPATIENT)
Dept: SPEECH THERAPY | Facility: CLINIC | Age: 82
Setting detail: THERAPIES SERIES
End: 2017-12-08
Attending: INTERNAL MEDICINE
Payer: MEDICARE

## 2017-12-08 ENCOUNTER — OFFICE VISIT (OUTPATIENT)
Dept: FAMILY MEDICINE | Facility: CLINIC | Age: 82
End: 2017-12-08
Payer: MEDICARE

## 2017-12-08 VITALS
SYSTOLIC BLOOD PRESSURE: 112 MMHG | DIASTOLIC BLOOD PRESSURE: 59 MMHG | WEIGHT: 213 LBS | TEMPERATURE: 98.3 F | RESPIRATION RATE: 14 BRPM | BODY MASS INDEX: 35.49 KG/M2 | HEART RATE: 50 BPM | OXYGEN SATURATION: 97 % | HEIGHT: 65 IN

## 2017-12-08 DIAGNOSIS — I63.9 CEREBROVASCULAR ACCIDENT (CVA), UNSPECIFIED MECHANISM (H): Primary | ICD-10-CM

## 2017-12-08 DIAGNOSIS — F33.9 EPISODE OF RECURRENT MAJOR DEPRESSIVE DISORDER, UNSPECIFIED DEPRESSION EPISODE SEVERITY (H): ICD-10-CM

## 2017-12-08 DIAGNOSIS — E78.5 HYPERLIPIDEMIA LDL GOAL <100: ICD-10-CM

## 2017-12-08 DIAGNOSIS — I48.20 CHRONIC ATRIAL FIBRILLATION (H): ICD-10-CM

## 2017-12-08 DIAGNOSIS — R47.1 DYSARTHRIA: ICD-10-CM

## 2017-12-08 PROCEDURE — G8999 MOTOR SPEECH CURRENT STATUS: HCPCS | Mod: GN,CK | Performed by: SPEECH-LANGUAGE PATHOLOGIST

## 2017-12-08 PROCEDURE — 92522 EVALUATE SPEECH PRODUCTION: CPT | Mod: GN | Performed by: SPEECH-LANGUAGE PATHOLOGIST

## 2017-12-08 PROCEDURE — G9186 MOTOR SPEECH GOAL STATUS: HCPCS | Mod: GN,CI | Performed by: SPEECH-LANGUAGE PATHOLOGIST

## 2017-12-08 PROCEDURE — 40000211 ZZHC STATISTIC SLP  DEPARTMENT VISIT: Performed by: SPEECH-LANGUAGE PATHOLOGIST

## 2017-12-08 PROCEDURE — 99214 OFFICE O/P EST MOD 30 MIN: CPT | Performed by: INTERNAL MEDICINE

## 2017-12-08 RX ORDER — METOPROLOL TARTRATE 50 MG
25 TABLET ORAL 2 TIMES DAILY
Qty: 120 TABLET | Refills: 0 | Status: SHIPPED | OUTPATIENT
Start: 2017-12-08 | End: 2017-12-18

## 2017-12-08 RX ORDER — ESCITALOPRAM OXALATE 5 MG/1
5 TABLET ORAL DAILY
Qty: 60 TABLET | Refills: 11 | Status: SHIPPED | OUTPATIENT
Start: 2017-12-08 | End: 2017-12-18

## 2017-12-08 NOTE — PROGRESS NOTES
"[facility] Outpatient Speech-Language and Dysarthria Evaluation   12/08/17 1000   General Information   Type of Evaluation Speech and Language;Dysarthria   Type Of Visit Initial   Start Of Care Date 12/08/17   Referring Physician Dr. Clark   Orders Evaluate And Treat   Medical Diagnosis Cerebrovascular accident (CVA), dysarthrai   Onset Of Illness/injury Or Date Of Surgery 12/01/17   Surgical/Medical history reviewed Yes   Pertinent History Of Current Problem 82-year-old female with past medical history of atrial fibrillation, on aspirin and Multaq, also vertigo, remote history of colon cancer and depression.  Admitted to [facility] with word finding difficulties, slurred speech, right-sided weakness. MRI confirmed a CVA in the left lateral cortex of the left of the frontal lobe.   Prior Level Of Function Comment Living independently, no prior speech or language impairments   Current Community Support  (Staying with daughter [place] for rehab)   Patient Role/employment History Retired   Living environment [place]   General Observations Pt and family report they feel speech has improved greatly since time of injury when they say patient \"was unable to speak\" at all.    Patient/family Goals To improve speech intelligibilty   General Information Comments Highest level of education in 12th grade   Oral Motor Sensory Function   Comments Right sided labial weakness, reduced coordination of labial and lingual musculature.    Speech   Deficits in Phonation Strained-strangled quality;Loudness (soft)  (monotone)   Deficits in Articulation Unilateral upper motor neuron   AIDS-words, % intelligible 86   AIDS-sentences, % intelligible 89   Speech Comments Vowel distortions present, reduced articulatory precision, particularly with blends, multisyllabics, and lingual sounds (/r/, /ch/, /t/). Rate of speech is slow, effortful, pitch is somewhat monotone with reduced volume. Patient's voice is hyponasal, but she endorses that " this was present prior to CVA.    Language: Auditory Comprehension (understanding of spoken language)   Tests were administered at the following levels Complex (vocation/community/social activities)   Commands; Pullman Diagnostic Aphasia Exam 3 (out of 15 total) 15   Functional Assessment Scale (Auditory Comprehension) No Impairment   Language: Verbal Expression (use of spoken language to express information)   Tests were administered at the following levels Complex (vocation/community/social activities)   [place] Naming Test, short form (out of 15 total) 12   Define Words; [place] Test for Differential Diagnosis Of Aphasia (out of 10 total) 7.5   Generative Naming Score; Cognitive Linguistic Quick Test 7   Generative Naming; Cognitive Linguistic Quick Test Result WNL   Functional Assessment Scale (Verbal Expression) Minimal Impairment   Comments (Verbal Expression) Patient denies concerns about verbal expression and word-finding ability, feels she is at baseline.   Reading Comprehension (understanding of written language)   Tests were administered at the following levels Complex (vocation/community/social activities)   Sentences and Paragraphs; [place] Diagnostic Aphasia Exam (out of 10 total) 10   Functional Assessment Scale (Reading Comprehension) Minimal Impairment   Comments (Reading Comprehension) Pt denies concerns about reading ability, did not extended time to process for more complex paragraph reading tasks   Written Expression (use of writing to express information)   Tests were administered at the following levels Complex (vocation/community/social activities)   Functional Assessment Scale (Written Expression) No Impairment   Comments (Written Expression) Adequate access to word-finding and spelling ability in writing.    Cognitive Status Examination   Cognitive Status Exam Comments Did not assess.   Education Assessment   Barriers to Learning No barriers   Preferred Learning Style  Listening;Reading;Demonstration;Pictures/video   General Therapy Interventions   Planned Therapy Interventions Communication   Communication Improve speech intelligibility   Intervention Comments Patient is not interested in language intervention. Feels she is close to baseline and would like to focus on speech intelligibility only.   Clinical Impression, SLP Eval   Criteria for Skilled Therapeutic Interventions Met yes;treatment indicated   SLP Diagnosis Mild-moderate dysarthria   Functional limitations due to impairments Patient unable to communicate effectively for daily language needs   Therapy Frequency 2 times;per week  (for 4 weeks, then 1x/week for 4 weeks)   Risks and Benefits of Treatment have been explained. Yes   Patient, Family & other staff in agreement with plan of care Yes   Clinical Impression Comments Patient presents with mild-moderate dysarthria secondary to right-sided facial weakness from recent CVA. Testing of language reveals minimal impairment to reading and verbal expression, WNL on writing and listening tasks. However, patient denies concerns about these areas (feels they are at baseline) and indicates she would like to focus treatment solely in improving speech intelligiblity. RECOMMEND: Patient will benefit from skilled intervention to train compensatory strategies for speech intelligibilty, as well as training in oral motor and speech intelligibility exercises to improve strength and coordination of facial musculature for speech tasks.   Cognitive/Communication Goals   Cognitive/Communication Goals 1;2   Cognitive/Communication Goal 1   Goal Description Patient will learn and implement independently 2-3 strategies to increase speech intelligibilty at the word and phrase level to >95% intelligiblity to meet daily speech needs.   Target Date 02/26/18   Cognitive/Communication Goal 2   Goal Description Patient will learn and implement independently 2-3 strategies to increase speech  intelligibilty to >95% across at 20 minute conversation to meet daily speech needs.   Target Date 02/26/18   Total Session Time   Total Evaluation Time 45   Therapy Certification   Certification date from 12/08/17   Certification date to 02/26/18   Medical Diagnosis Cerebrovascular accident (CVA), dysarthrai     Thank you for your referral of this patient.      Zakia Pompa MA, CCC-SLP  Speech-Language Pathology  [facility]  Phone: [phone]  Pager: [phone]

## 2017-12-08 NOTE — PROGRESS NOTES
[facility]          OUTPATIENT SPEECH LANGUAGE PATHOLOGY LANGUAGE-COGNITION  EVALUATION  PLAN OF TREATMENT FOR OUTPATIENT REHABILITATION  (COMPLETE FOR INITIAL CLAIMS ONLY)  Patient's Last Name, First Name, M.I.  YOB: 1935  Daija Burgess                        Provider s Name: [facility] Medical Record No.  [id number]     Onset Date:  12/01/17   Start of Care Date: 12/08/17   Type:     ___PT  __OT   _X_SLP    Medical Diagnosis:  Cerebrovascular accident (CVA), dysarthrai   Speech Language Pathology Diagnosis:  Mild-moderate dysarthria    Visits from SOC: 1                                        ________________________________________________________________________________  Plan of Treatment/Functional Goals:   Planned Therapy Interventions: Communication   Intervention Comments: Patient is not interested in language intervention. Feels she is close to baseline and would like to focus on speech intelligibility only.    Communication Goals   1.         Goal Description: Patient will learn and implement independently 2-3 strategies to increase speech intelligibilty at the word and phrase level to >95% intelligiblity to meet daily speech needs.       Target Date: 02/26/18   2.         Goal Description: Patient will learn and implement independently 2-3 strategies to increase speech intelligibilty to >95% across at 20 minute conversation to meet daily speech needs.       Target Date: 02/26/18                Zakia Pompa, SLP       I CERTIFY THE NEED FOR THESE SERVICES FURNISHED UNDER        THIS PLAN OF TREATMENT AND WHILE UNDER MY CARE     (Physician co-signature of this document indicates review and certification of the therapy plan).                    Certification Date From:  12/08/17  Certification Date To:   02/26/18          Referring Physician:    Amber    Initial Assessment        See Epic Evaluation Start Of Care Date: 12/08/17

## 2017-12-08 NOTE — PROGRESS NOTES
SUBJECTIVE:   Daija Burgess is a 82 year old female who presents to clinic today for the following health issues:          Hospital Follow-up Visit:    Hospital/Nursing Home/IP Rehab Facility: [facility]  Date of Admission: 12/01/2017  Date of Discharge: 12/04/2017  Reason(s) for Admission: Left-sided cerebrovascular accident (CVA) (H) and A-Fib            Problems taking medications regularly:  Possible-       Medication changes since discharge: yes       Problems adhering to non-medication therapy:  None    Summary of hospitalization:  [facility] discharge summary reviewed  Diagnostic Tests/Treatments reviewed.  Follow up needed: Cardiology, Speech therapy  Other Healthcare Providers Involved in Patient s Care:         Specialist appointment - Cardiology  Update since discharge: improved.     Post Discharge Medication Reconciliation: discharge medications reconciled and changed, per note/orders (see AVS).  Plan of care communicated with patient and family     Coding guidelines for this visit:  Type of Medical   Decision Making Face-to-Face Visit       within 7 Days of discharge Face-to-Face Visit        within 14 days of discharge   Moderate Complexity [id number]   High Complexity [id number]              HPI:   Patient Daija Burgess is a very pleasant 82 year old female with history of recent CVA, chronic atrial fibrillation who presents to Internal Medicine clinic today for post hospital discharge follow up for recent hospitalization for Left-sided cerebrovascular accident (CVA) (H) and A-Fib. Regarding the patient's left sided CVA, the patient was evaluated by Neurology during her recent hospitalization. Patient is originally from [facility] and is visiting her daughter's family who lives in the [facility]. Patient reports that she will be moving back to [place] in the near future and will try to establish care with a local Neurology clinic upon her return to [place].  Regarding the patient's atrial fibrillation, the patient was evaluated by cardiology specialist in the hospital and has been started on both metoprolol and Eliquis medications for atrial fibrillation. She is also due to follow up with cardiology and speech therapy outpatient clinics in the near future while she is in [place] for the next a few weeks before her return to [place].         Current Medications:     Current Outpatient Prescriptions   Medication Sig Dispense Refill     apixaban ANTICOAGULANT (ELIQUIS) 5 MG tablet Take 1 tablet (5 mg) by mouth 2 times daily 120 tablet 3     escitalopram (LEXAPRO) 5 MG tablet Take 1 tablet (5 mg) by mouth daily 60 tablet 11     metoprolol (LOPRESSOR) 50 MG tablet Take 0.5 tablets (25 mg) by mouth 2 times daily 120 tablet 0     dronedarone (MULTAQ) 400 MG TABS tablet Take 1 tablet (400 mg) by mouth 2 times daily (with meals) 120 tablet 0     pravastatin (PRAVACHOL) 10 MG tablet Take 2 tablets (20 mg) by mouth every evening (Patient taking differently: Take 10 mg by mouth every evening ) 120 tablet 0     Docusate Sodium (COLACE PO) Take 100 mg by mouth daily        ZONISAMIDE PO Take 100 mg by mouth At Bedtime        [DISCONTINUED] escitalopram (LEXAPRO) 5 MG tablet Take 1 tablet (5 mg) by mouth daily 60 tablet 0     [DISCONTINUED] metoprolol (LOPRESSOR) 50 MG tablet Take 0.5 tablets (25 mg) by mouth 2 times daily 120 tablet 0     [DISCONTINUED] Dronedarone HCl (MULTAQ PO) Take 400 mg by mouth 2 times daily (with meals)            Allergies:    No Known Allergies         Past Medical History:     Past Medical History:   Diagnosis Date     Cancer (H)      Depressive disorder      Vertigo          Past Surgical History:     Past Surgical History:   Procedure Laterality Date     CHOLECYSTECTOMY       COLONOSCOPY       ORTHOPEDIC SURGERY           Family Medical History:   History reviewed. No pertinent family history.      Social History:     Social History     Social History     Marital  "status:      Spouse name: N/A     Number of children: N/A     Years of education: N/A     Occupational History     Not on file.     Social History Main Topics     Smoking status: Never Smoker     Smokeless tobacco: Never Used     Alcohol use No     Drug use: No     Sexual activity: Not on file     Other Topics Concern     Not on file     Social History Narrative           Review of System:     Constitutional: Negative for fever or chills  Skin: Negative for rashes  Ears/Nose/Throat: Negative for nasal congestion, sore throat  Respiratory: No shortness of breath, dyspnea on exertion, cough, or hemoptysis  Cardiovascular: Negative for chest pain. Positive for atrial fibrillation  Gastrointestinal: Negative for nausea, vomiting  Genitourinary: Negative for dysuria, hematuria  Musculoskeletal: Negative for myalgias  Neurologic: Positive for recent CVA  Psychiatric: Negative for depression, anxiety  Hematologic/Lymphatic/Immunologic: Negative  Endocrine: Negative  Behavioral: Negative for tobacco use       Physical Exam:   /59  Pulse 50  Temp 98.3  F (36.8  C) (Oral)  Resp 14  Ht 5' 5\" (1.651 m)  Wt 213 lb (96.6 kg)  SpO2 97%  Breastfeeding? No  BMI 35.45 kg/m2    GENERAL: alert and no distress  EYES: eyes grossly normal to inspection, and conjunctivae and sclerae normal  HENT: Normocephalic atraumatic. Nose and mouth without ulcers or lesions  NECK: supple  RESP: lungs clear to auscultation   CV: mildly bradycardic  LYMPH: no peripheral edema   ABDOMEN: nondistended  MS: no gross musculoskeletal defects noted  SKIN: no suspicious lesions or rashes  NEURO: Alert & Oriented x 3. Mild dysarthria present.  PSYCH: mentation appears normal, affect normal        Diagnostic Test Results:     Diagnostic Test Results:  Results for orders placed or performed during the hospital encounter of 12/01/17   CT Head w/o Contrast    Narrative    CT SCAN OF THE HEAD WITHOUT CONTRAST   12/1/2017 3:57 PM     HISTORY: " Code stroke.    TECHNIQUE:  Axial images of the head and coronal reformations without  IV contrast material.  Radiation dose for this scan was reduced using  automated exposure control, adjustment of the mA and/or kV according  to patient size, or iterative reconstruction technique.    COMPARISON: None.    FINDINGS: There is some mild cerebral atrophy. Minimal nonspecific  white matter changes are present without mass effect. There is no  evidence for intracranial hemorrhage, mass effect, acute infarct, or  skull fracture.      Impression    IMPRESSION: Chronic changes. No evidence for intracranial hemorrhage  or any acute process.      SAI ESTRADA MD   CT Head w Contrast    Narrative    CT HEAD WITH CONTRAST 12/1/2017 4:08 PM     HISTORY: Aphasia.    TECHNIQUE: Axial images were obtained through the brain with  intravenous contrast for CT brain perfusion imaging. 50 mL of  Isovue-370 was given. Multiplanar reconstructions were performed.  Radiation dose for this scan was reduced using automated exposure  control, adjustment of the mA and/or kV according to patient size, or  iterative reconstruction technique..    FINDINGS: Cerebral blood flow, cerebral blood volume, mean transit  time maps are symmetric. There is no evidence for ischemia or infarct.      Impression    IMPRESSION: Negative CT brain perfusion imaging.    SAI ESTRADA MD   CTA Angiogram Head Neck    Narrative    CTA ANGIOGRAM HEAD NECK  12/1/2017 4:04 PM     HISTORY: code stroke;     TECHNIQUE:  Precontrast localizing scans were followed by CT  angiography with an injection of 70 mL Isovue -370 IV contrast with  scans through the head and neck.  Images were transferred to a  separate 3-D workstation where multiplanar reformations and 3-D images  were created.  Estimates of carotid stenoses are made relative to the  distal internal carotid artery diameters except as noted. Radiation  dose for this scan was reduced using automated exposure  control,  adjustment of the mA and/or kV according to patient size, or iterative  reconstruction technique.    COMPARISON: None.    FINDINGS: Estimates of stenosis at the carotid bifurcations are  relative to the distal internal carotids.    Arch: Calcified atherosclerotic plaque in the arch. Normal anatomy.    Neck:  Right Carotid:  The right common carotid artery is normal.  Calcified  atherosclerotic plaque is seen at the right carotid bifurcation. No  significant stenosis is seen..  Calcified plaque in the carotid  siphon..     Left Carotid:  Left common carotid artery is tortuous..  Calcified  atherosclerotic plaque is seen at the left carotid bifurcation. No  significant stenosis is seen. Stenosis was calculated at 25%..  Calcified atherosclerotic plaque is seen in the left carotid siphon..       Vertebrals:  The vertebral arteries are normal.    Head:  Right Carotid:No occluded vessels are seen. .    Left Carotid:  No occluded vessels are identified. .    Basilar:  The basilar artery and its branches appear normal.       Impression    IMPRESSION:   1. No significant stenosis is seen at either carotid bifurcation.  2. No arterial dissection is seen.  3. No occluded intracranial vessels or high-grade intracranial  vascular stenosis is identified.    I agree with the preliminary report that was communicated to the  referring physician.     CYNTHIA NOGUERA MD   MRI Brain w & w/o contrast    Narrative    MR BRAIN W/O & W CONTRAST  12/1/2017 9:44 PM     HISTORY:  Acute CVI;     TECHNIQUE: Multiplanar, multisequence MRI of the brain without and  with 9mL gadavist IV contrast material.    COMPARISON: CT studies dated 12/1/2017.    FINDINGS:  Diffusion-weighted images reveal area of restricted  diffusion in the lateral cortex of the left posterior frontal lobe.  This area of restricted diffusion measures approximately 3 cm in  transverse dimension by 1 cm in AP dimension. This area of restricted  diffusion is consistent  with an acute cortical infarct..  There is no  hemorrhage or mass effect.. There are no gadolinium enhancing lesions.   The facial structures appear normal.  The arteries at the base of the  brain and the dural venous sinuses appear patent.      Impression    IMPRESSION: Study is consistent with an acute infarct in the lateral  cortex of the left frontal lobe.     CYNTHIA NOGUERA MD   CT Head w/o Contrast    Narrative    CT SCAN OF THE HEAD WITHOUT CONTRAST   12/2/2017 9:55 AM     HISTORY: Follow-up cerebrovascular incident.      TECHNIQUE: Axial images of the head and coronal reformations without  IV contrast material.  Radiation dose for this scan was reduced using  automated exposure control, adjustment of the mA and/or kV according  to patient size, or iterative reconstruction technique.    COMPARISON: MR dated 12/1/2017.    FINDINGS: The left posterolateral acute frontal ischemic infarct seen  on MR is not as readily appreciated on CT. There is no evidence for  any hemorrhagic transformation. Mild cerebral atrophy is present along  with some minimal nonspecific white matter changes. There is no  evidence for hydrocephalus, mass effect, or skull fracture.      Impression    IMPRESSION:  1. No evidence for hemorrhagic transformation of posterolateral left  frontal infarct.  2. Mild cerebral atrophy with minimal nonspecific white matter  changes.    SAI ESTRADA MD   Basic metabolic panel   Result Value Ref Range    Sodium 140 133 - 144 mmol/L    Potassium 3.9 3.4 - 5.3 mmol/L    Chloride 108 94 - 109 mmol/L    Carbon Dioxide 24 20 - 32 mmol/L    Anion Gap 8 3 - 14 mmol/L    Glucose 144 (H) 70 - 99 mg/dL    Urea Nitrogen 16 7 - 30 mg/dL    Creatinine 0.94 0.52 - 1.04 mg/dL    GFR Estimate 57 (L) >60 mL/min/1.7m2    GFR Estimate If Black 69 >60 mL/min/1.7m2    Calcium 9.1 8.5 - 10.1 mg/dL   CBC with platelets differential   Result Value Ref Range    WBC 7.5 4.0 - 11.0 10e9/L    RBC Count 4.54 3.8 - 5.2 10e12/L     Hemoglobin 14.0 11.7 - 15.7 g/dL    Hematocrit 42.2 35.0 - 47.0 %    MCV 93 78 - 100 fl    MCH 30.8 26.5 - 33.0 pg    MCHC 33.2 31.5 - 36.5 g/dL    RDW 13.3 10.0 - 15.0 %    Platelet Count 209 150 - 450 10e9/L    Diff Method Automated Method     % Neutrophils 73.8 %    % Lymphocytes 19.4 %    % Monocytes 6.5 %    % Eosinophils 0.1 %    % Basophils 0.1 %    % Immature Granulocytes 0.1 %    Nucleated RBCs 0 0 /100    Absolute Neutrophil 5.6 1.6 - 8.3 10e9/L    Absolute Lymphocytes 1.5 0.8 - 5.3 10e9/L    Absolute Monocytes 0.5 0.0 - 1.3 10e9/L    Absolute Eosinophils 0.0 0.0 - 0.7 10e9/L    Absolute Basophils 0.0 0.0 - 0.2 10e9/L    Abs Immature Granulocytes 0.0 0 - 0.4 10e9/L    Absolute Nucleated RBC 0.0    INR   Result Value Ref Range    INR 0.98 0.86 - 1.14   Partial thromboplastin time   Result Value Ref Range    PTT 24 22 - 37 sec   Troponin I   Result Value Ref Range    Troponin I ES <0.015 0.000 - 0.045 ug/L   CRP inflammation   Result Value Ref Range    CRP Inflammation <2.9 0.0 - 8.0 mg/L   Hemoglobin A1c   Result Value Ref Range    Hemoglobin A1C 6.2 (H) 4.3 - 6.0 %   Lipid panel reflex to direct LDL   Result Value Ref Range    Cholesterol 137 <200 mg/dL    Triglycerides 93 <150 mg/dL    HDL Cholesterol 65 >49 mg/dL    LDL Cholesterol Calculated 53 <100 mg/dL    Non HDL Cholesterol 72 <130 mg/dL   TSH with free T4 reflex   Result Value Ref Range    TSH 2.00 0.40 - 4.00 mU/L   Vitamin D Deficiency   Result Value Ref Range    Vitamin D Deficiency screening 15 (L) 20 - 75 ug/L   Troponin I   Result Value Ref Range    Troponin I ES <0.015 0.000 - 0.045 ug/L   Glucose by meter   Result Value Ref Range    Glucose 81 70 - 99 mg/dL   Glucose by meter   Result Value Ref Range    Glucose 87 70 - 99 mg/dL   Glucose by meter   Result Value Ref Range    Glucose 101 (H) 70 - 99 mg/dL   Creatinine   Result Value Ref Range    Creatinine 0.94 0.52 - 1.04 mg/dL    GFR Estimate 57 (L) >60 mL/min/1.7m2    GFR Estimate If Black  69 >60 mL/min/1.7m2   Glucose by meter   Result Value Ref Range    Glucose 103 (H) 70 - 99 mg/dL   EKG 12-lead, tracing only   Result Value Ref Range    Interpretation ECG Click View Image link to view waveform and result    Neurology IP Consult: Patient to be seen: Routine - within 24 hours; Right facial droop and hand weakness, slurred speech, please eval, thank you; Consultant may enter orders: Yes    Narrative    Betsey Hill MD     12/2/2017 10:53 AM  Neurology Consultation Note          Assessment and Plan:     Left hemispheric cardioembolic infarction    Paroxysmal atrial fibrillation    She does not have any contraindications to anticoagulation,   therefore I would recommend repeating a HCT today and if there is   no hemorrhagic conversion to start anticoagulation. I would   recommend apixaban once she has passed her swallow evaluation. If   swallowing is significantly affected and she is NPO (which I   think unlikely) then we would anticoagulate with heparin   initially. Start rehabilitation effort. She has not seen her   cardiologist in 3 years a=since he was retired, so a review by   cardiology may be useful. The family are in favor of a cardiology   consultation. We will defer to hospitalist service. Discussed   with patient and her daughters.    ADDENDUM: repeat HCT without hemorrhagic conversion, patient able   to swallow, we will start apixaban.         History of Present Illness:   I was asked to provide neurological consultation for Mrs. Daija Burgess with regard to a recent stroke. She is attended by two   daughters. She lives in [place], and yesterday am she boarded an   airplane to come to [place] for a family function. While   boarding she noticed that she had some word finding difficulty.   During the flight she did not talk much, and felt that her right   hand was slightly weak, especially fingers II-III. No numbness.   Her speech was noted to be slurred as well, so she cane to  the   hospital for further evaluation. Some f her symptoms had improved   in the interim, and the work up in the ED showed no large vessel   occlusion or bleed. She did take 3 low intensity aspirins before   arriving to the hospital. She was outside the window for IV tPA.   A brain MRI obtained last night confirmed a left cortical    embolic infarct. Her tele strips have been in NSR overnight. She   has a history of PAF which has been treated with Multaq for many   years and she has not had any TIAs. However in the last few   months she has had episodes where she feels weak and devoid of   energy, without any sensation of palpitations. I accessed her   records from [place], and it does not look like there has been any   specific contraindication to anticoagulation. She has a rthritic   gait pattern, but no falls and she is independent in her   ambulation.          Review of Systems:   The 10 point Review of Systems is negative other than noted in   the HPI. She has chronic vertigo, for which she is on zonisamide,   which has helped.           Past Medical History:     Past Medical History:   Diagnosis Date     Cancer (H)      Depressive disorder      Vertigo             Past Surgical History:     Past Surgical History:   Procedure Laterality Date     CHOLECYSTECTOMY       COLONOSCOPY       ORTHOPEDIC SURGERY              Medications:       sodium chloride (PF)  3 mL Intracatheter Q8H     naloxone, - MEDICATION INSTRUCTIONS -, acetaminophen, ondansetron   **OR** ondansetron, labetalol, hydrALAZINE, metoprolol, sodium   chloride (PF)                   Allergies:   None; however she states that she is sensitive to many   medications.         Family History:   Mother and sister had strokes.         Social History:     Social History     Social History     Marital status:      Spouse name: N/A     Number of children: N/A     Years of education: N/A     Occupational History     Not on file.     Social History Main  "Topics     Smoking status: Never Smoker     Smokeless tobacco: Not on file     Alcohol use No     Drug use: No     Sexual activity: Not on file     Other Topics Concern     Not on file     Social History Narrative     No narrative on file                 Physical Exam:   Vitals were reviewed  Blood pressure 139/71, pulse 66, temperature 98.2  F (36.8  C),   temperature source Oral, resp. rate 18, height 1.651 m (5' 5\"),   weight 98.7 kg (217 lb 8 oz), SpO2 97 %.  Wt Readings from Last 4 Encounters:   17 98.7 kg (217 lb 8 oz)     Temperatures:  Current - Temp: 98.2  F (36.8  C); Max - Temp    Av.9  F (36.6  C)  Min: 97.5  F (36.4  C)  Max: 98.2  F   (36.8  C)  Blood pressure range: Systolic (24hrs), Av , Min:139 ,   Max:170   ; Diastolic (24hrs), Av, Min:71, Max:82    I/O last 3 completed shifts:  In: 403 [I.V.:403]  Out: 650 [Urine:650]  General appearance:  healthy, alert and no distress, appears hydarated, vital signs   stable  and overweight  Cardiovascular:  NORMAL - regular rate and rhythm without murmur. and carotids   with brisk upstroke/without bruit bilaterally easily palpated   bilaterally  Neurologic:   Mental Status Exam:   Level of Alertness:   awake  Orientation:   normal to person, place, time  Memory:   normal for age (2/3)  Fund of Knowledge:  normal  Attention/Concentration:  normal  Language:  Slurred with word finding difficulty; naming, reading,   repetition, commands are normal.  Cranial Nerves: Visual fields: full; EOM: intact; Pupils: PERRL;   Fundi: No papilledema; V, VII: no facial asymmetry to sensory,   very slight right facial weakness; tongue, palate: midline;   shoulder and neck muscle strength: symmetric. Moderate   dysarthria.  Motor Exam:  moves all extremities well and symmetrically.  Sensory:  sensory intact to light touch/vibration.  Coordination: finger/Nose:  right:  normal; left:  Normal;   heel-Knee-Shin:  right:  normal; left:  normal; rapid Alternating "   Movements:  right:  normal; left:  normal.  Gait: independent, slightly waddling.  Deep Tendon Reflexes:  reflexes are hypoactive and symmetrical,   except for a reduced left KJ compared to right; plantar response:    Right: flexor; left: flexor.              Data:   All laboratory and imaging data reviewed by me:  Results for orders placed or performed during the hospital   encounter of 12/01/17 (from the past 24 hour(s))   Basic metabolic panel   Result Value Ref Range    Sodium 140 133 - 144 mmol/L    Potassium 3.9 3.4 - 5.3 mmol/L    Chloride 108 94 - 109 mmol/L    Carbon Dioxide 24 20 - 32 mmol/L    Anion Gap 8 3 - 14 mmol/L    Glucose 144 (H) 70 - 99 mg/dL    Urea Nitrogen 16 7 - 30 mg/dL    Creatinine 0.94 0.52 - 1.04 mg/dL    GFR Estimate 57 (L) >60 mL/min/1.7m2    GFR Estimate If Black 69 >60 mL/min/1.7m2    Calcium 9.1 8.5 - 10.1 mg/dL   CBC with platelets differential   Result Value Ref Range    WBC 7.5 4.0 - 11.0 10e9/L    RBC Count 4.54 3.8 - 5.2 10e12/L    Hemoglobin 14.0 11.7 - 15.7 g/dL    Hematocrit 42.2 35.0 - 47.0 %    MCV 93 78 - 100 fl    MCH 30.8 26.5 - 33.0 pg    MCHC 33.2 31.5 - 36.5 g/dL    RDW 13.3 10.0 - 15.0 %    Platelet Count 209 150 - 450 10e9/L    Diff Method Automated Method     % Neutrophils 73.8 %    % Lymphocytes 19.4 %    % Monocytes 6.5 %    % Eosinophils 0.1 %    % Basophils 0.1 %    % Immature Granulocytes 0.1 %    Nucleated RBCs 0 0 /100    Absolute Neutrophil 5.6 1.6 - 8.3 10e9/L    Absolute Lymphocytes 1.5 0.8 - 5.3 10e9/L    Absolute Monocytes 0.5 0.0 - 1.3 10e9/L    Absolute Eosinophils 0.0 0.0 - 0.7 10e9/L    Absolute Basophils 0.0 0.0 - 0.2 10e9/L    Abs Immature Granulocytes 0.0 0 - 0.4 10e9/L    Absolute Nucleated RBC 0.0    INR   Result Value Ref Range    INR 0.98 0.86 - 1.14   Partial thromboplastin time   Result Value Ref Range    PTT 24 22 - 37 sec   CT Head w/o Contrast    Narrative    CT SCAN OF THE HEAD WITHOUT CONTRAST   12/1/2017 3:57 PM     HISTORY: Code  stroke.    TECHNIQUE:  Axial images of the head and coronal reformations   without  IV contrast material.  Radiation dose for this scan was reduced   using  automated exposure control, adjustment of the mA and/or kV   according  to patient size, or iterative reconstruction technique.    COMPARISON: None.    FINDINGS: There is some mild cerebral atrophy. Minimal   nonspecific  white matter changes are present without mass effect. There is no  evidence for intracranial hemorrhage, mass effect, acute infarct,   or  skull fracture.      Impression    IMPRESSION: Chronic changes. No evidence for intracranial   hemorrhage  or any acute process.      SAI ESTRADA MD   CTA Angiogram Head Neck    Narrative    CTA ANGIOGRAM HEAD NECK  12/1/2017 4:04 PM     HISTORY: code stroke;     TECHNIQUE:  Precontrast localizing scans were followed by CT  angiography with an injection of 70 mL Isovue -370 IV contrast   with  scans through the head and neck.  Images were transferred to a  separate 3-D workstation where multiplanar reformations and 3-D   images  were created.  Estimates of carotid stenoses are made relative to   the  distal internal carotid artery diameters except as noted.   Radiation  dose for this scan was reduced using automated exposure control,  adjustment of the mA and/or kV according to patient size, or   iterative  reconstruction technique.    COMPARISON: None.    FINDINGS: Estimates of stenosis at the carotid bifurcations are  relative to the distal internal carotids.    Arch: Calcified atherosclerotic plaque in the arch. Normal   anatomy.    Neck:  Right Carotid:  The right common carotid artery is normal.    Calcified  atherosclerotic plaque is seen at the right carotid bifurcation.   No  significant stenosis is seen..  Calcified plaque in the carotid  siphon..     Left Carotid:  Left common carotid artery is tortuous..    Calcified  atherosclerotic plaque is seen at the left carotid bifurcation.    No  significant stenosis is seen. Stenosis was calculated at 25%..  Calcified atherosclerotic plaque is seen in the left carotid   siphon..       Vertebrals:  The vertebral arteries are normal.    Head:  Right Carotid:No occluded vessels are seen. .    Left Carotid:  No occluded vessels are identified. .    Basilar:  The basilar artery and its branches appear normal.       Impression    IMPRESSION:   1. No significant stenosis is seen at either carotid bifurcation.  2. No arterial dissection is seen.  3. No occluded intracranial vessels or high-grade intracranial  vascular stenosis is identified.    I agree with the preliminary report that was communicated to the  referring physician.     CYNTHIA NOGUERA MD   CT Head w Contrast    Narrative    CT HEAD WITH CONTRAST 12/1/2017 4:08 PM     HISTORY: Aphasia.    TECHNIQUE: Axial images were obtained through the brain with  intravenous contrast for CT brain perfusion imaging. 50 mL of  Isovue-370 was given. Multiplanar reconstructions were performed.  Radiation dose for this scan was reduced using automated exposure  control, adjustment of the mA and/or kV according to patient   size, or  iterative reconstruction technique..    FINDINGS: Cerebral blood flow, cerebral blood volume, mean   transit  time maps are symmetric. There is no evidence for ischemia or   infarct.      Impression    IMPRESSION: Negative CT brain perfusion imaging.    SAI ESTRADA MD   Troponin I   Result Value Ref Range    Troponin I ES <0.015 0.000 - 0.045 ug/L   CRP inflammation   Result Value Ref Range    CRP Inflammation <2.9 0.0 - 8.0 mg/L   Hemoglobin A1c   Result Value Ref Range    Hemoglobin A1C 6.2 (H) 4.3 - 6.0 %   Lipid panel reflex to direct LDL   Result Value Ref Range    Cholesterol 137 <200 mg/dL    Triglycerides 93 <150 mg/dL    HDL Cholesterol 65 >49 mg/dL    LDL Cholesterol Calculated 53 <100 mg/dL    Non HDL Cholesterol 72 <130 mg/dL   TSH with free T4 reflex   Result Value Ref Range     TSH 2.00 0.40 - 4.00 mU/L   MRI Brain w & w/o contrast    Narrative    MR BRAIN W/O & W CONTRAST  2017 9:44 PM     HISTORY:  Acute CVI;     TECHNIQUE: Multiplanar, multisequence MRI of the brain without   and  with 9mL gadavist IV contrast material.    COMPARISON: CT studies dated 2017.    FINDINGS:  Diffusion-weighted images reveal area of restricted  diffusion in the lateral cortex of the left posterior frontal   lobe.  This area of restricted diffusion measures approximately 3 cm in  transverse dimension by 1 cm in AP dimension. This area of   restricted  diffusion is consistent with an acute cortical infarct..  There   is no  hemorrhage or mass effect.. There are no gadolinium enhancing   lesions.   The facial structures appear normal.  The arteries at the base   of the  brain and the dural venous sinuses appear patent.      Impression    IMPRESSION: Study is consistent with an acute infarct in the   lateral  cortex of the left frontal lobe.     CYNTHIA NOGUERA MD   Troponin I   Result Value Ref Range    Troponin I ES <0.015 0.000 - 0.045 ug/L          Echocardiogram Complete    Narrative    [id number]  [facility]  [id number]  [id number]^MAY^TYREE^DOV           [facility]  Echocardiography Laboratory  [address] [place] [address]        Name: KEYANNA SEGOVIA  MRN: [id number]  : 1935  Study Date: 2017 10:31 AM  Age: 82 yrs  Gender: Female  Patient Location: [facility]  Reason For Study: Afib  Ordering Physician: TYREE MCKEON  Referring Physician: TYREE MCKEON  Performed By: Caity Mccarty RDCS     BSA: 2.0 m2  Height: 65 in  Weight: 217 lb  HR: 97  BP: 142/74 mmHg  _____________________________________________________________________________  __        Procedure  Complete Echo Adult.  _____________________________________________________________________________  __        Interpretation Summary     The rhythm was atrial flutter.  The left ventricle is normal in size.  Left  ventricular systolic function is normal.  The visual ejection fraction is estimated at 55-60%.  Normal left ventricular wall motion  The left atrium is mildly dilated.  There is no comparison study available.  _____________________________________________________________________________  __        Left Ventricle  The left ventricle is normal in size. There is normal left ventricular wall  thickness. Left ventricular systolic function is normal. The visual ejection  fraction is estimated at 55-60%. Normal left ventricular wall motion.     Right Ventricle  The right ventricle is normal in structure, function and size.     Atria  The left atrium is mildly dilated. Right atrial size is normal. There is no  atrial shunt seen.     Mitral Valve  The mitral valve is normal in structure and function. There is trace mitral  regurgitation.        Tricuspid Valve  The tricuspid valve is normal in structure and function. There is trace  tricuspid regurgitation. Right ventricular systolic pressure could not be  approximated due to inadequate tricuspid regurgitation. Normal IVC (1.5-2.5cm)  with >50% respiratory collapse; right atrial pressure is estimated at 5-  10mmHg.     Aortic Valve  The aortic valve is normal in structure and function. No aortic regurgitation  is present. No aortic stenosis is present.     Pulmonic Valve  The pulmonic valve is not well seen, but is grossly normal. There is trace  pulmonic valvular regurgitation.     Vessels  Normal size aorta. The IVC is normal in size and reactivity with respiration,  suggesting normal central venous pressure.     Pericardium  There is no pericardial effusion.        Rhythm  The rhythm was atrial flutter.  _____________________________________________________________________________  __  MMode/2D Measurements & Calculations  IVSd: 1.1 cm     LVIDd: 3.9 cm  LVIDs: 3.3 cm  LVPWd: 1.1 cm  FS: 16.5 %  EDV(Teich): 67.3 ml  ESV(Teich): 43.7 ml  LV mass(C)d: 138.8 grams  LV  mass(C)dI: 67.8 grams/m2  Ao root diam: 3.2 cm  LA dimension: 3.4 cm  asc Aorta Diam: 3.4 cm  LA/Ao: 1.1  LVOT diam: 2.1 cm  LVOT area: 3.6 cm2  LA Volume (BP): 69.8 ml  LA Volume Index (BP): 34.0 ml/m2  RWT: 0.56           Doppler Measurements & Calculations  MV E max tucker: 90.9 cm/sec  MV dec time: 0.22 sec  E/E' avg: 10.8  Lateral E/e': 11.8  Medial E/e': 9.9           _____________________________________________________________________________  __           Report approved by: Rabia Urban 12/03/2017 12:03 PM          ASSESSMENT/PLAN:     (I63.9) Cerebrovascular accident (CVA), unspecified mechanism (H)  (primary encounter diagnosis)  (R47.1) Dysarthria  Comment: Recent hospitalization for CVA. Patient is due for follow up with outpatient speech therapy.  Plan: Patient is advised to continue outpatient speech therapy follow up and to establish outpatient Neurology clinic follow up ASAP upon her return to [place].      (I48.2) Chronic atrial fibrillation (H)   Comment: patient has been compliant with her cardiac medications since hospital discharge including Eliquis. Her heart rate is currently well controlled on the Metoprolol beta blocker medication.   Plan: Patient is scheduled to follow up with cardiology specialist clinic on 12/18/2017. I have refilled the patietn's apixaban ANTICOAGULANT (ELIQUIS) 5 MG tablet, and metoprolol (LOPRESSOR) 50 MG tablet      (F33.9) Episode of recurrent major depressive disorder, unspecified depression episode severity (H)  Comment: Patient is due for a refill of her Lexapro medication.  Plan:  I have refilled escitalopram (LEXAPRO) 5 MG tablet medication today.      (E78.5) Hyperlipidemia LDL goal <100  Comment: Patient is compliant with Pravastatin medication.  Plan: Continue Pravastatin cholesterol medication.      Follow Up Plan:     Patient is instructed to return to Internal Medicine clinic for follow-up visit in 2 weeks.        Dia Davenport MD  Internal  [facility]

## 2017-12-08 NOTE — MR AVS SNAPSHOT
After Visit Summary   12/8/2017    Daija Burgess    MRN: [id number]           Patient Information     Date Of Birth          1935        Visit Information        Provider Department      12/8/2017 11:30 AM Dia Davenport MD [facility]        Today's Diagnoses     Cerebrovascular accident (CVA), unspecified mechanism (H)    -  1    Chronic atrial fibrillation (H)        Episode of recurrent major depressive disorder, unspecified depression episode severity (H)        Hyperlipidemia LDL goal <100        Dysarthria           Follow-ups after your visit        Follow-up notes from your care team     Return in about 2 weeks (around 12/22/2017).      Your next 10 appointments already scheduled     Dec 11, 2017  9:00 AM CST   Neuro Treatment with SADI Sanchez   [facility] ([facility])    [address]  [address]  [facility] [address]   [phone]            Dec 11, 2017 10:30 AM CST   Holter Monitor with MARIE   [facility] - [facility] ([facility])    [address]  [facility] [address]   [phone]            Dec 12, 2017  1:30 PM CST   Neuro Treatment with SADI Eagle   [facility] ([facility])    [address] [place] [address]  [address]  [facility] [address]   [phone]            Dec 15, 2017  9:00 AM CST   Neuro Treatment with SADI Eagle   [facility] ([facility])    [address] [place] [address]  [address]  [facility] [address]   [phone]            Dec 18, 2017  1:30 PM CST   [facility] EP RETURN with Pao Martínez PA-C   [facility] ([facility])    [address]  [facility] [address]   [phone]            Dec 19, 2017 10:00 AM CST   Neuro Treatment with Ashleigh Marshall, SADI   [facility] ([facility])    [address] [facility]  "[facility] [address]  [place] [address]   [phone]            Dec 22, 2017 10:30 AM CST   Neuro Treatment with Ashleigh Marshall, SADI   [facility] Speech Therapy ([facility])    [address]  [place] [address]   [phone]            Dec 26, 2017  3:15 PM CST   Neuro Treatment with Ashleigh Marshall, SADI   [facility] Speech Therapy ([facility])    [address] [place] [address] [address]  [place] [address]   [phone]            Dec 29, 2017 11:00 AM CST   Neuro Treatment with Ashleigh Marshall, SADI   [facility] ([facility])    [address] [place] [address] [address]  [place] [address]   [phone]            Jan 03, 2018 11:15 AM CST   Neuro Treatment with Allison E Alpers, SADI   [facility] ([facility])    [address] [place] [address] [address]  [place] [address]   [phone]              Who to contact     If you have questions or need follow up information about today's clinic visit or your schedule please contact [facility] directly at [phone].  Normal or non-critical lab and imaging results will be communicated to you by MyChart, letter or phone within 4 business days after the clinic has received the results. If you do not hear from us within 7 days, please contact the clinic through [employer] or phone. If you have a critical or abnormal lab result, we will notify you by phone as soon as possible.  Submit refill requests through [employer] or call your pharmacy and they will forward the refill request to us. Please allow 3 business days for your refill to be completed.          Additional Information About Your Visit        [employer] Information     [employer] lets you send messages to your doctor, view your test results, renew your prescriptions, schedule appointments and more. To sign up, go to [email or web address] . Click on \"Log in\" on the left side of the screen, which will take you to the Welcome page. Then " "click on \"Sign up Now\" on the right side of the page.     You will be asked to enter the access code listed below, as well as some personal information. Please follow the directions to create your username and password.     Your access code is: [email or web address]  Expires: 3/4/2018 11:55 AM     Your access code will  in 90 days. If you need help or a new code, please call your [place] clinic or [phone].        Care EveryWhere ID     This is your Care EveryWhere ID. This could be used by other organizations to access your [place] medical records  [id number]        Your Vitals Were     Pulse Temperature Respirations Height Pulse Oximetry Breastfeeding?    50 98.3  F (36.8  C) (Oral) 14 5' 5\" (1.651 m) 97% No    BMI (Body Mass Index)                   35.45 kg/m2            Blood Pressure from Last 3 Encounters:   17 112/59   17 119/64    Weight from Last 3 Encounters:   17 213 lb (96.6 kg)   17 217 lb 8 oz (98.7 kg)              Today, you had the following     No orders found for display         Today's Medication Changes          These changes are accurate as of: 17  2:16 PM.  If you have any questions, ask your nurse or doctor.               These medicines have changed or have updated prescriptions.        Dose/Directions    pravastatin 10 MG tablet   Commonly known as:  PRAVACHOL   This may have changed:  how much to take   Used for:  Cerebrovascular accident (CVA), unspecified mechanism (H)        Dose:  20 mg   Take 2 tablets (20 mg) by mouth every evening   Quantity:  120 tablet   Refills:  0            Where to get your medicines      These medications were sent to [id number] - [place], [place] - [address]  [address] [facility] [address]     Phone:  [phone]     apixaban ANTICOAGULANT 5 MG tablet    escitalopram 5 MG tablet    metoprolol 50 MG tablet                Primary Care Provider Office Phone # Fax #    Dia Davenport [phone] " [phone]       [address] [facility] [address]  [facility] [address]        Equal Access to Services     VIV VALENTIN : Hadii aad ku hadkrissyo Mercedes, waaxda luqadaha, qaybta kaalmada chadwick, maggie lanrein hayaadenise priceruben badillo romaine burger. So [facility] [phone].    ATENCIÓN: Si habla español, tiene a weinberg disposición servicios gratuitos de asistencia lingüística. Llame al [phone].    We comply with applicable federal civil rights laws and [place] laws. We do not discriminate on the basis of race, color, national origin, age, disability, sex, sexual orientation, or gender identity.            Thank you!     Thank you for choosing [facility]  for your care. Our goal is always to provide you with excellent care. Hearing back from our patients is one way we can continue to improve our services. Please take a few minutes to complete the written survey that you may receive in the mail after your visit with us. Thank you!             Your Updated Medication List - Protect others around you: Learn how to safely use, store and throw away your medicines at www.disposemymeds.org.          This list is accurate as of: 12/8/17  2:16 PM.  Always use your most recent med list.                   Brand Name Dispense Instructions for use Diagnosis    apixaban ANTICOAGULANT 5 MG tablet    ELIQUIS    120 tablet    Take 1 tablet (5 mg) by mouth 2 times daily    Cerebrovascular accident (CVA), unspecified mechanism (H)       COLACE PO      Take 100 mg by mouth daily        dronedarone 400 MG Tabs tablet    MULTAQ    120 tablet    Take 1 tablet (400 mg) by mouth 2 times daily (with meals)    Cerebrovascular accident (CVA), unspecified mechanism (H)       escitalopram 5 MG tablet    LEXAPRO    60 tablet    Take 1 tablet (5 mg) by mouth daily    Cerebrovascular accident (CVA), unspecified mechanism (H)       metoprolol 50 MG tablet    LOPRESSOR    120 tablet    Take 0.5 tablets (25 mg) by mouth 2 times daily    Cerebrovascular accident  (CVA), unspecified mechanism (H)       pravastatin 10 MG tablet    PRAVACHOL    120 tablet    Take 2 tablets (20 mg) by mouth every evening    Cerebrovascular accident (CVA), unspecified mechanism (H)       ZONISAMIDE PO      Take 100 mg by mouth At Bedtime

## 2017-12-11 ENCOUNTER — HOSPITAL ENCOUNTER (OUTPATIENT)
Dept: CARDIOLOGY | Facility: CLINIC | Age: 82
Discharge: HOME | End: 2017-12-11
Attending: NURSE PRACTITIONER | Admitting: NURSE PRACTITIONER
Payer: MEDICARE

## 2017-12-11 ENCOUNTER — HOSPITAL ENCOUNTER (OUTPATIENT)
Dept: SPEECH THERAPY | Facility: CLINIC | Age: 82
Setting detail: THERAPIES SERIES
End: 2017-12-11
Attending: INTERNAL MEDICINE
Payer: MEDICARE

## 2017-12-11 DIAGNOSIS — I48.0 PAROXYSMAL ATRIAL FIBRILLATION (H): ICD-10-CM

## 2017-12-11 PROCEDURE — 93227 XTRNL ECG REC<48 HR R&I: CPT | Performed by: INTERNAL MEDICINE

## 2017-12-11 PROCEDURE — 93225 XTRNL ECG REC<48 HRS REC: CPT | Performed by: NURSE PRACTITIONER

## 2017-12-11 PROCEDURE — 92507 TX SP LANG VOICE COMM INDIV: CPT | Mod: GN | Performed by: SPEECH-LANGUAGE PATHOLOGIST

## 2017-12-11 PROCEDURE — 40000211 ZZHC STATISTIC SLP  DEPARTMENT VISIT: Performed by: SPEECH-LANGUAGE PATHOLOGIST

## 2017-12-13 PROBLEM — I48.0 PAROXYSMAL ATRIAL FIBRILLATION (H): Status: ACTIVE | Noted: 2017-12-13

## 2017-12-13 PROBLEM — F32.A DEPRESSION: Status: ACTIVE | Noted: 2017-12-13

## 2017-12-13 PROBLEM — I63.9 CARDIOEMBOLIC STROKE (H): Status: ACTIVE | Noted: 2017-12-13

## 2017-12-13 PROBLEM — R42 VERTIGO: Status: ACTIVE | Noted: 2017-12-13

## 2017-12-15 ENCOUNTER — HOSPITAL ENCOUNTER (OUTPATIENT)
Dept: SPEECH THERAPY | Facility: CLINIC | Age: 82
Setting detail: THERAPIES SERIES
End: 2017-12-15
Attending: INTERNAL MEDICINE
Payer: MEDICARE

## 2017-12-15 PROCEDURE — 40000211 ZZHC STATISTIC SLP  DEPARTMENT VISIT: Performed by: SPEECH-LANGUAGE PATHOLOGIST

## 2017-12-15 PROCEDURE — 92507 TX SP LANG VOICE COMM INDIV: CPT | Mod: GN | Performed by: SPEECH-LANGUAGE PATHOLOGIST

## 2017-12-18 ENCOUNTER — OFFICE VISIT (OUTPATIENT)
Dept: CARDIOLOGY | Facility: CLINIC | Age: 82
End: 2017-12-18
Attending: NURSE PRACTITIONER
Payer: MEDICARE

## 2017-12-18 VITALS
HEIGHT: 65 IN | HEART RATE: 60 BPM | DIASTOLIC BLOOD PRESSURE: 74 MMHG | BODY MASS INDEX: 35.16 KG/M2 | WEIGHT: 211 LBS | SYSTOLIC BLOOD PRESSURE: 128 MMHG

## 2017-12-18 DIAGNOSIS — I48.0 PAROXYSMAL ATRIAL FIBRILLATION (H): Primary | ICD-10-CM

## 2017-12-18 DIAGNOSIS — I63.9 CEREBROVASCULAR ACCIDENT (CVA), UNSPECIFIED MECHANISM (H): ICD-10-CM

## 2017-12-18 PROCEDURE — 99214 OFFICE O/P EST MOD 30 MIN: CPT | Mod: 25 | Performed by: PHYSICIAN ASSISTANT

## 2017-12-18 PROCEDURE — 93000 ELECTROCARDIOGRAM COMPLETE: CPT | Performed by: PHYSICIAN ASSISTANT

## 2017-12-18 RX ORDER — METOPROLOL TARTRATE 25 MG/1
12.5 TABLET, FILM COATED ORAL 2 TIMES DAILY
COMMUNITY
Start: 2017-12-18 | End: 2018-01-04

## 2017-12-18 NOTE — MR AVS SNAPSHOT
"              After Visit Summary   12/18/2017    Daija Burgess    MRN: [id number]           Patient Information     Date Of Birth          1935        Visit Information        Provider Department      12/18/2017 1:30 PM Pao Martínez PA-C [facility]   [place]        Today's Diagnoses     Paroxysmal atrial fibrillation (H)    -  1    Cerebrovascular accident (CVA), unspecified mechanism (H)          Care Instructions    1. Reviewed your Holter showing you had atrial fibrillation most of the monitor, with heart rate while you were in AFib being  bpm on average and you went back into normal rhythm at about 0730 the following morning. Your average HR in normal rhythm was about ~50 bpm.  This shows you have mild \"tachybrady syndrome,\" where your heart rate goes fast in AFib but slow in normal rhythm. You have \"paroxysmal AFib\"    2. As Multaq hasn't helped keep you out of AFib, please stop it.    3. Continue metoprolol tartrate 25 mg twice a day. Continue Eliquis 5 mg twice daily    4. Reviewed echocardiogram done in the hospital showing normal EF (ejection fraction) of 55-60% and no valve issues.    5. OK to take Vitamin D3 2000 IUs daily with food    6. EKG today looked good - sinus bradycardia @ 55 bpm              Follow-ups after your visit        Additional Services     Follow-Up with Cardiac Advanced Practice Provider                 Your next 10 appointments already scheduled     Dec 19, 2017 10:00 AM CST   Neuro Treatment with S[facility]I [facility] Speech Therapy ([facility])    [address]  [address]  [facility] [address]   [phone]            Dec 22, 2017 10:30 AM CST   Neuro Treatment with [facility] [place] [facility] ([facility])    [address] [place] [address]  [address]  [facility] [address]   [phone]            Dec 26, 2017  3:15 PM CST   Neuro Treatment with S[facility] Speech Therapy ([facility])    [address]  [address]  [place] [address]   [phone]            Dec 29, 2017 11:00 AM CST   Neuro Treatment with [facility] Speech Therapy ([facility])    [address]  [address]  [place] [address]   [phone]            Jan 03, 2018 11:15 AM CST   Neuro Treatment with Allison E Alpers, SLP [facility] Speech Therapy ([facility])    [address]  [address]  [place] [address]   [phone]            Jan 05, 2018 11:00 AM CST   Neuro Treatment with [facility] Speech Therapy ([facility])    [address]  [address]  [place] [address]   [phone]            Curtis 10, 2018 11:15 AM CST   Neuro Treatment with Allison E Alpers, SLP [facility] Speech Therapy ([facility])    [address]  [address]  [place] [address]   [phone]            Jan 17, 2018 11:15 AM CST   Neuro Treatment with Allison E Alpers, SLP [facility] Speech Therapy ([facility])    [address]  [address]  [place] [address]   [phone]            Jan 24, 2018 11:15 AM CST   Neuro Treatment with Allison E Alpers, SLP [facility] Speech Therapy ([facility])    [address]  [address]  [place] [address]   [phone]            Jan 31, 2018 11:15 AM CST   Neuro Treatment with Allison E Alpers, SLP [facility] Speech Therapy ([facility])    [address]  [address]  [place] [address]   [phone]              Future tests that were ordered for you today     Open Future Orders        Priority Expected Expires Ordered    EKG 12-lead complete w/read - Clinics (to be scheduled) Routine 1/1/2018 12/18/2018 12/18/2017    Follow-Up with Cardiac Advanced Practice Provider Routine 1/1/2018 12/18/2018 12/18/2017            Who to contact     If you have questions or need follow up  "information about today's clinic visit or your schedule please contact [facility] HEART [facility] directly at [phone].  Normal or non-critical lab and imaging results will be communicated to you by Pinstripehart, letter or phone within 4 business days after the clinic has received the results. If you do not hear from us within 7 days, please contact the clinic through Pinstripehart or phone. If you have a critical or abnormal lab result, we will notify you by phone as soon as possible.  Submit refill requests through Moprise or call your pharmacy and they will forward the refill request to us. Please allow 3 business days for your refill to be completed.          Additional Information About Your Visit        PinstripeharTM[facility] Health Information     Moprise lets you send messages to your doctor, view your test results, renew your prescriptions, schedule appointments and more. To sign up, go to [email or web address] . Click on \"Log in\" on the left side of the screen, which will take you to the Welcome page. Then click on \"Sign up Now\" on the right side of the page.     You will be asked to enter the access code listed below, as well as some personal information. Please follow the directions to create your username and password.     Your access code is: [email or web address]  Expires: 3/4/2018 11:55 AM     Your access code will  in 90 days. If you need help or a new code, please call your [facility] clinic or [phone].        Care EveryWhere ID     This is your Care EveryWhere ID. This could be used by other organizations to access your [facility] medical records  [id number]        Your Vitals Were     Pulse Height BMI (Body Mass Index)             60 1.651 m (5' 5\") 35.11 kg/m2          Blood Pressure from Last 3 Encounters:   17 128/74   17 112/59   17 119/64    Weight from Last 3 Encounters:   17 95.7 kg (211 lb)   17 96.6 kg (213 lb)   17 98.7 kg (217 lb 8 oz)              We " Performed the Following     EKG 12-lead complete w/read - Clinics (performed today)     Follow-Up with Cardiac Advanced Practice Provider          Today's Medication Changes          These changes are accurate as of: 12/18/17  2:22 PM.  If you have any questions, ask your nurse or doctor.               These medicines have changed or have updated prescriptions.        Dose/Directions    metoprolol 25 MG tablet   Commonly known as:  LOPRESSOR   This may have changed:  medication strength   Used for:  Cerebrovascular accident (CVA), unspecified mechanism (H)   Changed by:  Pao Martínez PA-C        Dose:  25 mg   Take 1 tablet (25 mg) by mouth 2 times daily   Refills:  0       pravastatin 10 MG tablet   Commonly known as:  PRAVACHOL   This may have changed:  how much to take   Used for:  Cerebrovascular accident (CVA), unspecified mechanism (H)        Dose:  20 mg   Take 2 tablets (20 mg) by mouth every evening   Quantity:  120 tablet   Refills:  0         Stop taking these medicines if you haven't already. Please contact your care team if you have questions.     dronedarone 400 MG Tabs tablet   Commonly known as:  MULTAQ   Stopped by:  Pao Martínez PA-C                    Primary Care Provider Office Phone # Fax #    Dia Bairon Davenport [phone] [address] [address]        Equal Access to Services     [facility] AH: Hadii marc moreno hadasho Sosiri, waaxda luqadaha, qaybta kaalmada adeegyada, maggie billings haymiya gavin . So [facility] [phone].    ATENCIÓN: Si habla español, tiene a weinberg disposición servicios gratuitos de asistencia lingüística. [place] al [phone].    We comply with applicable federal civil rights laws and [place] laws. We do not discriminate on the basis of race, color, national origin, age, disability, sex, sexual orientation, or gender identity.            Thank you!     Thank you for choosing [facility]  [facility] CARE   [place]  for your care. Our goal is always to provide you with excellent care. Hearing back from our patients is one way we can continue to improve our services. Please take a few minutes to complete the written survey that you may receive in the mail after your visit with us. Thank you!             Your Updated Medication List - Protect others around you: Learn how to safely use, store and throw away your medicines at www.disposemymeds.org.          This list is accurate as of: 12/18/17  2:22 PM.  Always use your most recent med list.                   Brand Name Dispense Instructions for use Diagnosis    apixaban ANTICOAGULANT 5 MG tablet    ELIQUIS    120 tablet    Take 1 tablet (5 mg) by mouth 2 times daily    Cerebrovascular accident (CVA), unspecified mechanism (H)       COLACE PO      Take 100 mg by mouth daily        metoprolol 25 MG tablet    LOPRESSOR     Take 1 tablet (25 mg) by mouth 2 times daily    Cerebrovascular accident (CVA), unspecified mechanism (H)       pravastatin 10 MG tablet    PRAVACHOL    120 tablet    Take 2 tablets (20 mg) by mouth every evening    Cerebrovascular accident (CVA), unspecified mechanism (H)       ZONISAMIDE PO      Take 100 mg by mouth At Bedtime

## 2017-12-18 NOTE — LETTER
"12/18/2017    Dia Davenport MD  [address]  [place] [address]    RE: Daija Burgess       Dear Colleague,    I had the pleasure of seeing Daija MOORE Jenifer in the [facility].    HPI:   I had the pleasure of meeting Daija today when she came for follow up of her recent hospitalization. She is a pleasant 81 y/o who was travelling from [place] to [place] to visit family and developed R facial droop and some aphasia. She did not seek attention until she was out of the window of TPA and imaging confirmed acute L posterolateral infarct. She had previously had a h/o paroxysmal AFib and had been on Multaq. Despite a high CHADSVASc score she had opted to remain on only aspirin therapy and had not been on chronic anticoagulation.      She was initially in SR but did have have an episode of atrial fibrillation while hospitalized. Her PTA Multaq was continued, metoprolol tartrate was added and Eliquis was started for CHADSVASc of at least 5 (CVA, Age >75 and female sex). A 24 hour Holter was placed and it was recommended she follow up before she went back to [place]. She was DCd in SR in the 60s on:    Multaq 400 mg BID   Metoprolol tartrate 25 mg BID (new)  Eliquis 5 mg BID (new)  Lexapro 5 mg daily  Colace 100 mg daily  Pravastatin 40 mg daily  Zonisamide 100 mg at bedtime.    Aspirin was stopped.     She states that since she left the hospital her major complaint has been that of severe fatigue. She has not had palpitations, lightheadedness, chest pain, edema or shortness of breath. She feels her stroke symptoms have largely abated.    Holter worn from 12/11-12/13 x 24 hours showed AFib with conversation to SR on 12/13 @ 7:49 AM with no significant post-conversion pause.  In AFib, HR was ~90 on average (57,158 bpm).  In SR, HR was ~53 on average (42, 72 bpm).  1 pause = 2.047 seconds noted @ 0200 (not related to conversion to SR). 677 PVCs and 3 PACs.  \"Extreme weakness and fatigue\" " was noted from 2141-3473, correlating with AFib with average HR ~100 bpm.    EKG 12/2/2017 showed coarse AFib with rate 124 bpm.    Echocardiogram 12/3/2017 showed EF 55-60% with mild dilation of the LA (3.4 cm with volume index 34.0 ml/m2) and no significant valvular abnormalities.     CTA Angiogram Head/Neck 12/1/17 showed no significant stenosis at either carotid bifurcation or arterial dissection.  No occluded intracranial vessels or high-grade intercranial vascular stenosis.     MRI brain 12/1 showed acute infarct in there lateral cortex of the L frontal lobe.       Assessment & Plan:    1. Recurrent paroxysmal AFib - PTA had been on Multaq and aspirin.  * Follow up Holter showed recurrent AFib with some element of mild tachy-maribell syndrome. Average HR in AFib 90s, average in SR was 50s. At this point, no strong evidence she requires PPM implantation, but will need to have FU.       PLAN:   * DC Multaq, as ineffective in preventing AFib   * Continue Eliquis 5 mg BID for CHADSVASc of 5 (CVA, age >75 and sex)   * Continue metoprolol tartrate 25 mg BID   * FU with EP/Cardiology team in [place] for close monitoring of tachybrady syndrome.    * As she will be here until after the holidays, requests another FU here - will see me in 2 weeks with EKG to review fatigue, Heart Rate and any concerns for recurrent atrial fibrillation.     2. 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, and started on AC after follow up CT showed no hemorrhagic conversion of her ischemic CVA   PLAN:   * Continue AC with Eliquis therapy    * Outpatient Speech Therapy   * Establish Neurology FU in [place]      Jessica Martínez PA-C, MSPAS      Orders Placed This Encounter   Procedures     Follow-Up with Cardiac Advanced Practice Provider     EKG 12-lead complete w/read - Clinics (performed today)     EKG 12-lead complete w/read - Clinics (to be scheduled)     Orders Placed This Encounter   Medications     metoprolol (LOPRESSOR) 25 MG tablet     Sig:  Take 1 tablet (25 mg) by mouth 2 times daily     Medications Discontinued During This Encounter   Medication Reason     escitalopram (LEXAPRO) 5 MG tablet Therapy completed     dronedarone (MULTAQ) 400 MG TABS tablet      metoprolol (LOPRESSOR) 50 MG tablet Reorder         Encounter Diagnoses   Name Primary?     Paroxysmal atrial fibrillation (H) Yes     Cerebrovascular accident (CVA), unspecified mechanism (H)        CURRENT MEDICATIONS:  Current Outpatient Prescriptions   Medication Sig Dispense Refill     metoprolol (LOPRESSOR) 25 MG tablet Take 1 tablet (25 mg) by mouth 2 times daily       apixaban ANTICOAGULANT (ELIQUIS) 5 MG tablet Take 1 tablet (5 mg) by mouth 2 times daily 120 tablet 3     pravastatin (PRAVACHOL) 10 MG tablet Take 2 tablets (20 mg) by mouth every evening (Patient taking differently: Take 10 mg by mouth every evening ) 120 tablet 0     Docusate Sodium (COLACE PO) Take 100 mg by mouth daily        ZONISAMIDE PO Take 100 mg by mouth At Bedtime        [DISCONTINUED] metoprolol (LOPRESSOR) 50 MG tablet Take 0.5 tablets (25 mg) by mouth 2 times daily 120 tablet 0       ALLERGIES   No Known Allergies    PAST MEDICAL HISTORY:  Past Medical History:   Diagnosis Date     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 No pertinent family history.    SOCIAL HISTORY:  Social History     Social History     Marital status:      Spouse name: N/A     Number of children: N/A     Years of education: N/A     Social History Main Topics     Smoking status: Never Smoker     Smokeless tobacco: Never Used     Alcohol use No     Drug use: No     Sexual activity: Not Asked     Other Topics Concern     None     Social History Narrative       Review of  "Systems:  Skin:  Negative     Eyes:  Negative    ENT:  Negative    Respiratory:  Negative for shortness of breath;dyspnea on exertion;cough  Cardiovascular:  Negative for;palpitations;chest pain;edema Positive for;fatigue  Gastroenterology: Negative for melena;hematochezia  Genitourinary:  Negative    Musculoskeletal:  Negative    Neurologic:  Negative    Psychiatric:  Negative    Heme/Lymph/Imm:  Negative    Endocrine:  Negative      Physical Exam:  Vitals: /74  Pulse 60  Ht 1.651 m (5' 5\")  Wt 95.7 kg (211 lb)  BMI 35.11 kg/m2    Constitutional:  cooperative, alert and oriented, well developed, well nourished, in no acute distress        Skin:  warm and dry to the touch, no apparent skin lesions or masses noted        Head:  normocephalic, no masses or lesions        Eyes:  pupils equal and round;conjunctivae and lids unremarkable;sclera white;no xanthalasma        ENT:           Neck:  JVP normal;no carotid bruit        Chest:  normal breath sounds, clear to auscultation, normal A-P diameter, normal symmetry, normal respiratory excursion, no use of accessory muscles        Cardiac: regular rhythm, normal S1/S2, no S3 or S4, apical impulse not displaced, no murmurs, gallops or rubs                  Abdomen:  abdomen soft obese      Vascular:                                        Extremities and Back:           Neurological:  no gross motor deficits          Recent Lab Results:  LIPID RESULTS:  Lab Results   Component Value Date    CHOL 137 12/01/2017    HDL 65 12/01/2017    LDL 53 12/01/2017    TRIG 93 12/01/2017       LIVER ENZYME RESULTS:  No results found for: AST, ALT    CBC RESULTS:  Lab Results   Component Value Date    WBC 7.5 12/01/2017    RBC 4.54 12/01/2017    HGB 14.0 12/01/2017    HCT 42.2 12/01/2017    MCV 93 12/01/2017    MCH 30.8 12/01/2017    MCHC 33.2 12/01/2017    RDW 13.3 12/01/2017     12/01/2017       BMP RESULTS:  Lab Results   Component Value Date     12/01/2017    " POTASSIUM 3.9 12/01/2017    CHLORIDE 108 12/01/2017    CO2 24 12/01/2017    ANIONGAP 8 12/01/2017     (H) 12/01/2017    BUN 16 12/01/2017    CR 0.94 12/04/2017    GFRESTIMATED 57 (L) 12/04/2017    GFRESTBLACK 69 12/04/2017    FAINA 9.1 12/01/2017        A1C RESULTS:  Lab Results   Component Value Date    A1C 6.2 (H) 12/01/2017       INR RESULTS:  Lab Results   Component Value Date    INR 0.98 12/01/2017       Thank you for allowing me to participate in the care of your patient.    Sincerely,     Pao Martínez PA-C     [facility]

## 2017-12-18 NOTE — PATIENT INSTRUCTIONS
"1. Reviewed your Holter showing you had atrial fibrillation most of the monitor, with heart rate while you were in AFib being  bpm on average and you went back into normal rhythm at about 0730 the following morning. Your average HR in normal rhythm was about ~50 bpm.  This shows you have mild \"tachybrady syndrome,\" where your heart rate goes fast in AFib but slow in normal rhythm. You have \"paroxysmal AFib\"    2. As Multaq hasn't helped keep you out of AFib, please stop it.    3. Continue metoprolol tartrate 25 mg twice a day. Continue Eliquis 5 mg twice daily    4. Reviewed echocardiogram done in the hospital showing normal EF (ejection fraction) of 55-60% and no valve issues.    5. OK to take Vitamin D3 2000 IUs daily with food    6. EKG today looked good - sinus bradycardia @ 55 bpm      "

## 2017-12-18 NOTE — PROGRESS NOTES
"HPI:   I had the pleasure of meeting Daija today when she came for follow up of her recent hospitalization. She is a pleasant 81 y/o who was travelling from [place] to [place] to visit family and developed R facial droop and some aphasia. She did not seek attention until she was out of the window of TPA and imaging confirmed acute L posterolateral infarct. She had previously had a h/o paroxysmal AFib and had been on Multaq. Despite a high CHADSVASc score she had opted to remain on only aspirin therapy and had not been on chronic anticoagulation.      She was initially in SR but did have have an episode of atrial fibrillation while hospitalized. Her PTA Multaq was continued, metoprolol tartrate was added and Eliquis was started for CHADSVASc of at least 5 (CVA, Age >75 and female sex). A 24 hour Holter was placed and it was recommended she follow up before she went back to [place]. She was DCd in SR in the 60s on:    Multaq 400 mg BID   Metoprolol tartrate 25 mg BID (new)  Eliquis 5 mg BID (new)  Lexapro 5 mg daily  Colace 100 mg daily  Pravastatin 40 mg daily  Zonisamide 100 mg at bedtime.    Aspirin was stopped.     She states that since she left the hospital her major complaint has been that of severe fatigue. She has not had palpitations, lightheadedness, chest pain, edema or shortness of breath. She feels her stroke symptoms have largely abated.    Holter worn from 12/11-12/13 x 24 hours showed AFib with conversation to SR on 12/13 @ 7:49 AM with no significant post-conversion pause.  In AFib, HR was ~90 on average (57,158 bpm).  In SR, HR was ~53 on average (42, 72 bpm).  1 pause = 2.047 seconds noted @ 0200 (not related to conversion to SR). 677 PVCs and 3 PACs.  \"Extreme weakness and fatigue\" was noted from 8822-2140, correlating with AFib with average HR ~100 bpm.    EKG 12/2/2017 showed coarse AFib with rate 124 bpm.    Echocardiogram 12/3/2017 showed EF 55-60% with mild dilation of the LA (3.4 cm with " volume index 34.0 ml/m2) and no significant valvular abnormalities.     CTA Angiogram Head/Neck 12/1/17 showed no significant stenosis at either carotid bifurcation or arterial dissection.  No occluded intracranial vessels or high-grade intercranial vascular stenosis.     MRI brain 12/1 showed acute infarct in there lateral cortex of the L frontal lobe.       Assessment & Plan:    1. Recurrent paroxysmal AFib - PTA had been on Multaq and aspirin.  * Follow up Holter showed recurrent AFib with some element of mild tachy-maribell syndrome. Average HR in AFib 90s, average in SR was 50s. At this point, no strong evidence she requires PPM implantation, but will need to have FU.       PLAN:   * DC Multaq, as ineffective in preventing AFib   * Continue Eliquis 5 mg BID for CHADSVASc of 5 (CVA, age >75 and sex)   * Continue metoprolol tartrate 25 mg BID   * FU with EP/Cardiology team in [place] for close monitoring of tachybrady syndrome.    * As she will be here until after the holidays, requests another FU here - will see me in 2 weeks with EKG to review fatigue, Heart Rate and any concerns for recurrent atrial fibrillation.     2. 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, and started on AC after follow up CT showed no hemorrhagic conversion of her ischemic CVA   PLAN:   * Continue AC with Eliquis therapy    * Outpatient Speech Therapy   * Establish Neurology FU in [place]      Jessica Martínez PA-C, MSPAS      Orders Placed This Encounter   Procedures     Follow-Up with Cardiac Advanced Practice Provider     EKG 12-lead complete w/read - Clinics (performed today)     EKG 12-lead complete w/read - Clinics (to be scheduled)     Orders Placed This Encounter   Medications     metoprolol (LOPRESSOR) 25 MG tablet     Sig: Take 1 tablet (25 mg) by mouth 2 times daily     Medications Discontinued During This Encounter   Medication Reason     escitalopram (LEXAPRO) 5 MG tablet Therapy completed     dronedarone (MULTAQ) 400 MG TABS  tablet      metoprolol (LOPRESSOR) 50 MG tablet Reorder         Encounter Diagnoses   Name Primary?     Paroxysmal atrial fibrillation (H) Yes     Cerebrovascular accident (CVA), unspecified mechanism (H)        CURRENT MEDICATIONS:  Current Outpatient Prescriptions   Medication Sig Dispense Refill     metoprolol (LOPRESSOR) 25 MG tablet Take 1 tablet (25 mg) by mouth 2 times daily       apixaban ANTICOAGULANT (ELIQUIS) 5 MG tablet Take 1 tablet (5 mg) by mouth 2 times daily 120 tablet 3     pravastatin (PRAVACHOL) 10 MG tablet Take 2 tablets (20 mg) by mouth every evening (Patient taking differently: Take 10 mg by mouth every evening ) 120 tablet 0     Docusate Sodium (COLACE PO) Take 100 mg by mouth daily        ZONISAMIDE PO Take 100 mg by mouth At Bedtime        [DISCONTINUED] metoprolol (LOPRESSOR) 50 MG tablet Take 0.5 tablets (25 mg) by mouth 2 times daily 120 tablet 0       ALLERGIES   No Known Allergies    PAST MEDICAL HISTORY:  Past Medical History:   Diagnosis Date     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 No pertinent family history.    SOCIAL HISTORY:  Social History     Social History     Marital status:      Spouse name: N/A     Number of children: N/A     Years of education: N/A     Social History Main Topics     Smoking status: Never Smoker     Smokeless tobacco: Never Used     Alcohol use No     Drug use: No     Sexual activity: Not Asked     Other Topics Concern     None     Social History Narrative       Review of Systems:  Skin:  Negative     Eyes:  Negative    ENT:  Negative    Respiratory:  Negative for shortness of breath;dyspnea on exertion;cough  Cardiovascular:  Negative for;palpitations;chest pain;edema Positive  "for;fatigue  Gastroenterology: Negative for melena;hematochezia  Genitourinary:  Negative    Musculoskeletal:  Negative    Neurologic:  Negative    Psychiatric:  Negative    Heme/Lymph/Imm:  Negative    Endocrine:  Negative      Physical Exam:  Vitals: /74  Pulse 60  Ht 1.651 m (5' 5\")  Wt 95.7 kg (211 lb)  BMI 35.11 kg/m2    Constitutional:  cooperative, alert and oriented, well developed, well nourished, in no acute distress        Skin:  warm and dry to the touch, no apparent skin lesions or masses noted        Head:  normocephalic, no masses or lesions        Eyes:  pupils equal and round;conjunctivae and lids unremarkable;sclera white;no xanthalasma        ENT:           Neck:  JVP normal;no carotid bruit        Chest:  normal breath sounds, clear to auscultation, normal A-P diameter, normal symmetry, normal respiratory excursion, no use of accessory muscles        Cardiac: regular rhythm, normal S1/S2, no S3 or S4, apical impulse not displaced, no murmurs, gallops or rubs                  Abdomen:  abdomen soft obese      Vascular:                                        Extremities and Back:           Neurological:  no gross motor deficits          Recent Lab Results:  LIPID RESULTS:  Lab Results   Component Value Date    CHOL 137 12/01/2017    HDL 65 12/01/2017    LDL 53 12/01/2017    TRIG 93 12/01/2017       LIVER ENZYME RESULTS:  No results found for: AST, ALT    CBC RESULTS:  Lab Results   Component Value Date    WBC 7.5 12/01/2017    RBC 4.54 12/01/2017    HGB 14.0 12/01/2017    HCT 42.2 12/01/2017    MCV 93 12/01/2017    MCH 30.8 12/01/2017    MCHC 33.2 12/01/2017    RDW 13.3 12/01/2017     12/01/2017       BMP RESULTS:  Lab Results   Component Value Date     12/01/2017    POTASSIUM 3.9 12/01/2017    CHLORIDE 108 12/01/2017    CO2 24 12/01/2017    ANIONGAP 8 12/01/2017     (H) 12/01/2017    BUN 16 12/01/2017    CR 0.94 12/04/2017    GFRESTIMATED 57 (L) 12/04/2017    " GFRESTBLACK 69 12/04/2017    FAINA 9.1 12/01/2017        A1C RESULTS:  Lab Results   Component Value Date    A1C 6.2 (H) 12/01/2017       INR RESULTS:  Lab Results   Component Value Date    INR 0.98 12/01/2017

## 2017-12-19 ENCOUNTER — HOSPITAL ENCOUNTER (OUTPATIENT)
Dept: SPEECH THERAPY | Facility: CLINIC | Age: 82
Setting detail: THERAPIES SERIES
End: 2017-12-19
Attending: INTERNAL MEDICINE
Payer: MEDICARE

## 2017-12-19 PROCEDURE — 40000211 ZZHC STATISTIC SLP  DEPARTMENT VISIT: Performed by: SPEECH-LANGUAGE PATHOLOGIST

## 2017-12-19 PROCEDURE — 92507 TX SP LANG VOICE COMM INDIV: CPT | Mod: GN | Performed by: SPEECH-LANGUAGE PATHOLOGIST

## 2017-12-20 NOTE — DISCHARGE SUMMARY
"Physician Discharge Summary     Name: Daija Burgess    MRN: [id number]     YOB: 1935    Age: 82 year old                                                 Primary care provider: Dia Davenport      Admit date:  12/1/2017      Discharge date and time: 12/4/2017  4:36 PM       Discharge Physician:  Mohit Clark        Discharge Diagnosis and brief summary        #1.  Acute left frontal lobe CVA    #2.  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      Admission Condition: fair     Discharged Condition: stable    /64 (BP Location: Left arm)  Pulse 66  Temp 97.5  F (36.4  C) (Oral)  Resp 16  Ht 1.651 m (5' 5\")  Wt 98.7 kg (217 lb 8 oz)  SpO2 93%  BMI 36.19 kg/m2       Presenting problem/signs and symptoms:    Slurred speech    Brief Hospital Summary:      This is an 82-year-old female with past medical history of atrial fibrillation, on aspirin and Multaq, also vertigo, remote history of colon cancer and depression.  She began having neurological deficits at 6:30 a.m.on day of admission to include word finding difficulties, had progressed to full slurred speech, some right-sided weakness which seems to be resolving but speech deficits are worsening.  She was admitted for further workup and treatment for suspected left hemispheric stroke.       1.  Left frontal lobe CVA; stable and improving.  Her CHADS2-VASc score is above 2.   - Appreciate eval. By Neurology today.  - Appreciate " Cardiology f/u re. A-fib  -Monitored on telemetry.   -continue Eliquis   2.  Hypertension; pressures stable.       3.  Atrial fibrillation, rate in 130's earlier.  Appreciate f/u by Dr. Tito Hayes.  - continued telemetry; increased metoprolol per Dr. Hayes  - resumed Multaq  - PRN IV metoprolol available     Patient's atrial fibrillation was controlled with heart rate in 60s and echocardiogram demonstrated normal findings with mildly dilated left atrium with EF of 55-60%.  Patient was seen by cardiologist and patient's beta-blocker was decreased to 25 of metoprolol twice a day and she was advised to continue her chronic Multaq and she was recommended to wear 24 hour Holter monitor with follow-up with electrophysiology in 1-2 weeks following discharge    4.  Depression; euthymic and cheerful on discharge .  - continue PTA escitalopram     5. Hyperglycemia with elevated A1C; may have pre-diabetes or mild DM.  -Patient's hemoglobin A1c was 6.2 on December 1, 2017     6. H/O vertigo; no new Sx.  - continue zonisamide dosing        Consultations during hospital stay         NEUROLOGY IP CONSULT  PHYSICAL THERAPY ADULT IP CONSULT  OCCUPATIONAL THERAPY ADULT IP CONSULT  SPEECH LANGUAGE PATH ADULT IP CONSULT  SWALLOW EVAL SPEECH PATH AT BEDSIDE IP CONSULT  SMOKING CESSATION PROGRAM IP CONSULT  CARDIOLOGY IP CONSULT      Major procedure performed/  Significant Diagnostic Studies           Results for orders placed or performed during the hospital encounter of 12/01/17   CT Head w/o Contrast    Narrative    CT SCAN OF THE HEAD WITHOUT CONTRAST   12/1/2017 3:57 PM     HISTORY: Code stroke.    TECHNIQUE:  Axial images of the head and coronal reformations without  IV contrast material.  Radiation dose for this scan was reduced using  automated exposure control, adjustment of the mA and/or kV according  to patient size, or iterative reconstruction technique.    COMPARISON: None.    FINDINGS: There is some mild cerebral atrophy.  Minimal nonspecific  white matter changes are present without mass effect. There is no  evidence for intracranial hemorrhage, mass effect, acute infarct, or  skull fracture.      Impression    IMPRESSION: Chronic changes. No evidence for intracranial hemorrhage  or any acute process.      SAI ESTRADA MD   CT Head w Contrast    Narrative    CT HEAD WITH CONTRAST 12/1/2017 4:08 PM     HISTORY: Aphasia.    TECHNIQUE: Axial images were obtained through the brain with  intravenous contrast for CT brain perfusion imaging. 50 mL of  Isovue-370 was given. Multiplanar reconstructions were performed.  Radiation dose for this scan was reduced using automated exposure  control, adjustment of the mA and/or kV according to patient size, or  iterative reconstruction technique..    FINDINGS: Cerebral blood flow, cerebral blood volume, mean transit  time maps are symmetric. There is no evidence for ischemia or infarct.      Impression    IMPRESSION: Negative CT brain perfusion imaging.    SAI ESTRADA MD   CTA Angiogram Head Neck    Narrative    CTA ANGIOGRAM HEAD NECK  12/1/2017 4:04 PM     HISTORY: code stroke;     TECHNIQUE:  Precontrast localizing scans were followed by CT  angiography with an injection of 70 mL Isovue -370 IV contrast with  scans through the head and neck.  Images were transferred to a  separate 3-D workstation where multiplanar reformations and 3-D images  were created.  Estimates of carotid stenoses are made relative to the  distal internal carotid artery diameters except as noted. Radiation  dose for this scan was reduced using automated exposure control,  adjustment of the mA and/or kV according to patient size, or iterative  reconstruction technique.    COMPARISON: None.    FINDINGS: Estimates of stenosis at the carotid bifurcations are  relative to the distal internal carotids.    Arch: Calcified atherosclerotic plaque in the arch. Normal anatomy.    Neck:  Right Carotid:  The right common carotid artery  is normal.  Calcified  atherosclerotic plaque is seen at the right carotid bifurcation. No  significant stenosis is seen..  Calcified plaque in the carotid  siphon..     Left Carotid:  Left common carotid artery is tortuous..  Calcified  atherosclerotic plaque is seen at the left carotid bifurcation. No  significant stenosis is seen. Stenosis was calculated at 25%..  Calcified atherosclerotic plaque is seen in the left carotid siphon..       Vertebrals:  The vertebral arteries are normal.    Head:  Right Carotid:No occluded vessels are seen. .    Left Carotid:  No occluded vessels are identified. .    Basilar:  The basilar artery and its branches appear normal.       Impression    IMPRESSION:   1. No significant stenosis is seen at either carotid bifurcation.  2. No arterial dissection is seen.  3. No occluded intracranial vessels or high-grade intracranial  vascular stenosis is identified.    I agree with the preliminary report that was communicated to the  referring physician.     CYNTHIA NOGUERA MD   MRI Brain w & w/o contrast    Narrative    MR BRAIN W/O & W CONTRAST  12/1/2017 9:44 PM     HISTORY:  Acute CVI;     TECHNIQUE: Multiplanar, multisequence MRI of the brain without and  with 9mL gadavist IV contrast material.    COMPARISON: CT studies dated 12/1/2017.    FINDINGS:  Diffusion-weighted images reveal area of restricted  diffusion in the lateral cortex of the left posterior frontal lobe.  This area of restricted diffusion measures approximately 3 cm in  transverse dimension by 1 cm in AP dimension. This area of restricted  diffusion is consistent with an acute cortical infarct..  There is no  hemorrhage or mass effect.. There are no gadolinium enhancing lesions.   The facial structures appear normal.  The arteries at the base of the  brain and the dural venous sinuses appear patent.      Impression    IMPRESSION: Study is consistent with an acute infarct in the lateral  cortex of the left frontal lobe.     CYNTHIA  MD POORNIMA   CT Head w/o Contrast    Narrative    CT SCAN OF THE HEAD WITHOUT CONTRAST   12/2/2017 9:55 AM     HISTORY: Follow-up cerebrovascular incident.      TECHNIQUE: Axial images of the head and coronal reformations without  IV contrast material.  Radiation dose for this scan was reduced using  automated exposure control, adjustment of the mA and/or kV according  to patient size, or iterative reconstruction technique.    COMPARISON: MR dated 12/1/2017.    FINDINGS: The left posterolateral acute frontal ischemic infarct seen  on MR is not as readily appreciated on CT. There is no evidence for  any hemorrhagic transformation. Mild cerebral atrophy is present along  with some minimal nonspecific white matter changes. There is no  evidence for hydrocephalus, mass effect, or skull fracture.      Impression    IMPRESSION:  1. No evidence for hemorrhagic transformation of posterolateral left  frontal infarct.  2. Mild cerebral atrophy with minimal nonspecific white matter  changes.    SAI ESTRADA MD       No results for input(s): WBC, HGB, HCT, MCV, PLT in the last 168 hours.  No results for input(s): CULT in the last 168 hours.  No results for input(s): NA, POTASSIUM, CHLORIDE, CO2, ANIONGAP, GLC, BUN, CR, GFRESTIMATED, GFRESTBLACK, FAINA, MAG, PHOS, PROTTOTAL, ALBUMIN, BILITOTAL, ALKPHOS, AST, ALT in the last 168 hours.    No results for input(s): GLC, BGM in the last 168 hours.        No results for input(s): INR in the last 168 hours.            Pending Results           Unresulted Labs Ordered in the Past 30 Days of this Admission     No orders found from 10/2/2017 to 12/2/2017.              Disposition         home      Allergies       No Known Allergies         Patient Instructions and Discharge Medications              Review of your medicines      CONTINUE these medicines which have NOT CHANGED       Dose / Directions    COLACE PO   Notes to Patient:  Take as needed for constipation        Dose:  100 mg   Take  100 mg by mouth daily   Refills:  0       pravastatin 10 MG tablet   Commonly known as:  PRAVACHOL   Used for:  Cerebrovascular accident (CVA), unspecified mechanism (H)        Dose:  20 mg   Take 2 tablets (20 mg) by mouth every evening   Quantity:  120 tablet   Refills:  0       ZONISAMIDE PO        Dose:  100 mg   Take 100 mg by mouth At Bedtime   Refills:  0         STOP taking          ASPIRIN PO           ESCITALOPRAM OXALATE PO           MULTAQ PO                Where to get your medicines      These medications were sent to [facility] - [address]  [address], [place] [address]     Phone:  [phone]      pravastatin 10 MG tablet              Discharge diet:  Active Diet Order      Diet  cardiac diet        Discharge activity:Activity as tolerated      Discharge follow-up:    Follow up with primary care provider in 7 days or earlier if symptoms return or gets worse.    Follow up with consultant as instructed      Other instructions:    We discussed with Patient/family about detail discharge instructions as well as discharge medications above including potential risks,side effects and benefits.Patient/family understood benefits and potential serious side effects of taking these medications and need to follow up with PCP if the patient develops complications.  Patient is also advised to see a doctor immediately for severe symptoms.          I saw and evaluated the patient on day of discharge and  discharge instructions reviewed  and  all the patient's questions and concerns addressed.Over 30 minutes spent on discharge and coordination of discharge process for this patient.          Disclaimer: This note consists of symbols derived from keyboarding, dictation and/or voice recognition software. As a result, there may be errors in the script that have gone undetected. Please consider this when interpreting information found in this chart

## 2017-12-22 ENCOUNTER — HOSPITAL ENCOUNTER (OUTPATIENT)
Dept: SPEECH THERAPY | Facility: CLINIC | Age: 82
Setting detail: THERAPIES SERIES
End: 2017-12-22
Attending: INTERNAL MEDICINE
Payer: MEDICARE

## 2017-12-22 PROCEDURE — 40000211 ZZHC STATISTIC SLP  DEPARTMENT VISIT: Performed by: SPEECH-LANGUAGE PATHOLOGIST

## 2017-12-22 PROCEDURE — 92507 TX SP LANG VOICE COMM INDIV: CPT | Mod: GN | Performed by: SPEECH-LANGUAGE PATHOLOGIST

## 2017-12-29 ENCOUNTER — HOSPITAL ENCOUNTER (OUTPATIENT)
Dept: SPEECH THERAPY | Facility: CLINIC | Age: 82
Setting detail: THERAPIES SERIES
End: 2017-12-29
Attending: INTERNAL MEDICINE
Payer: MEDICARE

## 2017-12-29 PROCEDURE — 92507 TX SP LANG VOICE COMM INDIV: CPT | Mod: GN | Performed by: SPEECH-LANGUAGE PATHOLOGIST

## 2017-12-29 PROCEDURE — 40000211 ZZHC STATISTIC SLP  DEPARTMENT VISIT: Performed by: SPEECH-LANGUAGE PATHOLOGIST

## 2017-12-31 ENCOUNTER — TELEPHONE (OUTPATIENT)
Dept: OTHER | Facility: CLINIC | Age: 82
End: 2017-12-31

## 2017-12-31 DIAGNOSIS — R53.83 OTHER FATIGUE: Primary | ICD-10-CM

## 2017-12-31 NOTE — TELEPHONE ENCOUNTER
Spoke with pt and pts daughter about fatigue. Pt has AF and was recently in the hospital with stroke. Fatigue is not a new sx and has already discussed this with SHU Martínez. Multaq was appropriately discontinued as pt frequently in AF. Continued on Eliquis and metoprolol. Pt continues to have profound fatigue. Already has follow-up the upcoming week. Pt will try to call Tuesday to be seen sooner.    Advised decreasing metoprolol to 12.5 mg BID (from 25 mg). Avg hr in AF was in 90s on recent Holter. Will go up with metoprolol decrease but hopefully still relatively well rate controlled. If pt remains fatigued may need to discontinue metoprolol. Then either consider another method of rate control (dilt, dig, or PPM/AVN ablation) or rhythm control (AAD or PVI).

## 2018-01-02 ENCOUNTER — HOSPITAL ENCOUNTER (OUTPATIENT)
Dept: SPEECH THERAPY | Facility: CLINIC | Age: 83
Setting detail: THERAPIES SERIES
End: 2018-01-02
Attending: INTERNAL MEDICINE
Payer: MEDICARE

## 2018-01-02 ENCOUNTER — TELEPHONE (OUTPATIENT)
Dept: CARDIOLOGY | Facility: CLINIC | Age: 83
End: 2018-01-02

## 2018-01-02 PROCEDURE — 92507 TX SP LANG VOICE COMM INDIV: CPT | Mod: GN | Performed by: SPEECH-LANGUAGE PATHOLOGIST

## 2018-01-02 PROCEDURE — 40000211 ZZHC STATISTIC SLP  DEPARTMENT VISIT: Performed by: SPEECH-LANGUAGE PATHOLOGIST

## 2018-01-02 NOTE — TELEPHONE ENCOUNTER
Pt daughter called as she and pt had made a call to the on call cardiologist on 12/31 d/t pt continued fatigue. Pt at times can not get out of bed and feels dizzy when up. Pt has OV scheduled this Friday with Jessica Martínez and was wanting to move this up. Explained that Jessica was out of office today and tomorrow, but there was another JETT available. Pt Daughter seemed to want to have pt see and EP, but did explain that at this time there are also no openings with an EP. Did explain that both Jessica and Moraima are EP JETT's and work directly with the EP's.  Daughter and pt both really like Jessica and have decided to have OV moved up a day and will call if there feel that this does need to be moved up more. Pt will see Jessica on 1/4 at 1200. Vera

## 2018-01-04 ENCOUNTER — TELEPHONE (OUTPATIENT)
Dept: CARDIOLOGY | Facility: CLINIC | Age: 83
End: 2018-01-04

## 2018-01-04 ENCOUNTER — OFFICE VISIT (OUTPATIENT)
Dept: CARDIOLOGY | Facility: CLINIC | Age: 83
End: 2018-01-04
Attending: PHYSICIAN ASSISTANT
Payer: MEDICARE

## 2018-01-04 VITALS
WEIGHT: 208 LBS | HEIGHT: 65 IN | DIASTOLIC BLOOD PRESSURE: 62 MMHG | BODY MASS INDEX: 34.66 KG/M2 | SYSTOLIC BLOOD PRESSURE: 108 MMHG | HEART RATE: 72 BPM

## 2018-01-04 DIAGNOSIS — I48.0 PAROXYSMAL ATRIAL FIBRILLATION (H): ICD-10-CM

## 2018-01-04 DIAGNOSIS — I48.0 PAROXYSMAL ATRIAL FIBRILLATION (H): Primary | ICD-10-CM

## 2018-01-04 PROBLEM — I48.91 A-FIB (H): Status: ACTIVE | Noted: 2017-01-01

## 2018-01-04 PROBLEM — I63.9 CVA (CEREBRAL VASCULAR ACCIDENT) (H): Status: ACTIVE | Noted: 2017-01-01

## 2018-01-04 PROBLEM — I48.91 A-FIB (H): Status: RESOLVED | Noted: 2017-01-01 | Resolved: 2018-01-04

## 2018-01-04 PROBLEM — I63.9 CVA (CEREBRAL VASCULAR ACCIDENT) (H): Status: RESOLVED | Noted: 2017-01-01 | Resolved: 2018-01-04

## 2018-01-04 PROBLEM — R53.83 FATIGUE: Status: ACTIVE | Noted: 2018-01-04

## 2018-01-04 LAB
ALBUMIN SERPL-MCNC: 3.7 G/DL (ref 3.4–5)
ALP SERPL-CCNC: 62 U/L (ref 40–150)
ALT SERPL W P-5'-P-CCNC: 23 U/L (ref 0–50)
ANION GAP SERPL CALCULATED.3IONS-SCNC: 4 MMOL/L (ref 3–14)
AST SERPL W P-5'-P-CCNC: 15 U/L (ref 0–45)
BILIRUB SERPL-MCNC: 0.5 MG/DL (ref 0.2–1.3)
BUN SERPL-MCNC: 20 MG/DL (ref 7–30)
CALCIUM SERPL-MCNC: 9.1 MG/DL (ref 8.5–10.1)
CHLORIDE SERPL-SCNC: 109 MMOL/L (ref 94–109)
CO2 SERPL-SCNC: 27 MMOL/L (ref 20–32)
CREAT SERPL-MCNC: 1.03 MG/DL (ref 0.52–1.04)
ERYTHROCYTE [DISTWIDTH] IN BLOOD BY AUTOMATED COUNT: 13.4 % (ref 10–15)
GFR SERPL CREATININE-BSD FRML MDRD: 51 ML/MIN/1.7M2
GLUCOSE SERPL-MCNC: 104 MG/DL (ref 70–99)
HCT VFR BLD AUTO: 43.8 % (ref 35–47)
HGB BLD-MCNC: 14.6 G/DL (ref 11.7–15.7)
MAGNESIUM SERPL-MCNC: 2.2 MG/DL (ref 1.6–2.3)
MCH RBC QN AUTO: 31.3 PG (ref 26.5–33)
MCHC RBC AUTO-ENTMCNC: 33.3 G/DL (ref 31.5–36.5)
MCV RBC AUTO: 94 FL (ref 78–100)
PLATELET # BLD AUTO: 201 10E9/L (ref 150–450)
POTASSIUM SERPL-SCNC: 4.4 MMOL/L (ref 3.4–5.3)
PROT SERPL-MCNC: 7.1 G/DL (ref 6.8–8.8)
RBC # BLD AUTO: 4.66 10E12/L (ref 3.8–5.2)
SODIUM SERPL-SCNC: 140 MMOL/L (ref 133–144)
WBC # BLD AUTO: 7.1 10E9/L (ref 4–11)

## 2018-01-04 PROCEDURE — 83735 ASSAY OF MAGNESIUM: CPT | Performed by: PHYSICIAN ASSISTANT

## 2018-01-04 PROCEDURE — 99214 OFFICE O/P EST MOD 30 MIN: CPT | Performed by: PHYSICIAN ASSISTANT

## 2018-01-04 PROCEDURE — 85027 COMPLETE CBC AUTOMATED: CPT | Performed by: PHYSICIAN ASSISTANT

## 2018-01-04 PROCEDURE — 80053 COMPREHEN METABOLIC PANEL: CPT | Performed by: PHYSICIAN ASSISTANT

## 2018-01-04 PROCEDURE — 36415 COLL VENOUS BLD VENIPUNCTURE: CPT | Performed by: PHYSICIAN ASSISTANT

## 2018-01-04 PROCEDURE — 93000 ELECTROCARDIOGRAM COMPLETE: CPT | Performed by: PHYSICIAN ASSISTANT

## 2018-01-04 NOTE — PROGRESS NOTES
HPI and Plan:   See dictation #[id number]    Orders Placed This Encounter   Procedures     Comprehensive metabolic panel     CBC with platelets     Magnesium     Follow-Up with Electrophysiologist       No orders of the defined types were placed in this encounter.      Medications Discontinued During This Encounter   Medication Reason     metoprolol (LOPRESSOR) 25 MG tablet          Encounter Diagnosis   Name Primary?     Paroxysmal atrial fibrillation (H) Yes       CURRENT MEDICATIONS:  Current Outpatient Prescriptions   Medication Sig Dispense Refill     apixaban ANTICOAGULANT (ELIQUIS) 5 MG tablet Take 1 tablet (5 mg) by mouth 2 times daily 120 tablet 3     pravastatin (PRAVACHOL) 10 MG tablet Take 2 tablets (20 mg) by mouth every evening (Patient taking differently: Take 10 mg by mouth every evening ) 120 tablet 0     Docusate Sodium (COLACE PO) Take 100 mg by mouth daily        ZONISAMIDE PO Take 100 mg by mouth At Bedtime          ALLERGIES   No Known Allergies    PAST MEDICAL HISTORY:  Past Medical History:   Diagnosis Date     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     Cancer (H)      Depression 12/13/2017     Depressive disorder      Vertigo        PAST SURGICAL HISTORY:  Past Surgical History:   Procedure Laterality Date     CHOLECYSTECTOMY       COLONOSCOPY       ORTHOPEDIC SURGERY         FAMILY HISTORY:  Family History   Problem Relation Age of Onset     Coronary Artery Disease Early Onset No family hx of        SOCIAL HISTORY:  Social History     Social History     Marital status:      Spouse name: N/A     Number of children: N/A     Years of education: N/A     Social History Main Topics     Smoking status: Never Smoker     Smokeless tobacco: Never Used     Alcohol use No     Drug use: No     Sexual activity: Not Asked     Other Topics Concern     None     Social History Narrative       Review of Systems:  Skin:  Negative       Eyes:  Negative      ENT:  Negative      Respiratory:  Negative for  "shortness of breath;dyspnea on exertion;cough     Cardiovascular:  Negative for;palpitations;chest pain;edema Positive for;fatigue \"overwhelming fatigue\"  Gastroenterology: Negative for melena;hematochezia Notes loose yellow stools   Genitourinary:  Negative      Musculoskeletal:  Negative      Neurologic:  Negative      Psychiatric:  Negative      Heme/Lymph/Imm:  Negative      Endocrine:  Negative        Physical Exam:  Vitals: /62  Pulse 72  Ht 1.651 m (5' 5\")  Wt 94.3 kg (208 lb)  BMI 34.61 kg/m2    Constitutional:  cooperative, alert and oriented, well developed, well nourished, in no acute distress        Skin:  warm and dry to the touch, no apparent skin lesions or masses noted          Head:  normocephalic, no masses or lesions        Eyes:  pupils equal and round;conjunctivae and lids unremarkable;sclera white;no xanthalasma        Lymph:      ENT:           Neck:  JVP normal;no carotid bruit        Respiratory:  normal breath sounds, clear to auscultation, normal A-P diameter, normal symmetry, normal respiratory excursion, no use of accessory muscles         Cardiac: regular rhythm, normal S1/S2, no S3 or S4, apical impulse not displaced, no murmurs, gallops or rubs                                                         GI:  abdomen soft obese      Extremities and Muscular Skeletal:      bilateral LE edema;trace          Neurological:  no gross motor deficits        Psych:  Alert and Oriented x 3      "

## 2018-01-04 NOTE — TELEPHONE ENCOUNTER
Called pt and daughter Lennie re: normal labs.  Lennie wondered if Eliquis could be causing stool color changes and weakness, which she saw could be a side effect.      Agrees we only want to make 1 change at a time - will hold metoprolol 12.5 mg BID and see Dr. Leary next week to see if should switch to different AC (recent CVA, so explained we can't just trial her off of it), if he thinks the AFib is the cause of her episodes of weakness/fatigue etc.      Component      Latest Ref Rng & Units 1/4/2018 1/4/2018 1/4/2018           1:06 PM  1:06 PM  1:06 PM   Sodium      133 - 144 mmol/L  140    Potassium      3.4 - 5.3 mmol/L  4.4    Chloride      94 - 109 mmol/L  109    Carbon Dioxide      20 - 32 mmol/L  27    Anion Gap      3 - 14 mmol/L  4    Glucose      70 - 99 mg/dL  104 (H)    Urea Nitrogen      7 - 30 mg/dL  20    Creatinine      0.52 - 1.04 mg/dL  1.03    GFR Estimate      >60 mL/min/1.7m2  51 (L)    GFR Estimate If Black      >60 mL/min/1.7m2  62    Calcium      8.5 - 10.1 mg/dL  9.1    Bilirubin Total      0.2 - 1.3 mg/dL  0.5    Albumin      3.4 - 5.0 g/dL  3.7    Protein Total      6.8 - 8.8 g/dL  7.1    Alkaline Phosphatase      40 - 150 U/L  62    ALT      0 - 50 U/L  23    AST      0 - 45 U/L  15    WBC      4.0 - 11.0 10e9/L 7.1     RBC Count      3.8 - 5.2 10e12/L 4.66     Hemoglobin      11.7 - 15.7 g/dL 14.6     Hematocrit      35.0 - 47.0 % 43.8     MCV      78 - 100 fl 94     MCH      26.5 - 33.0 pg 31.3     MCHC      31.5 - 36.5 g/dL 33.3     RDW      10.0 - 15.0 % 13.4     Platelet Count      150 - 450 10e9/L 201     Magnesium      1.6 - 2.3 mg/dL   2.2

## 2018-01-04 NOTE — PROGRESS NOTES
HISTORY OF PRESENT ILLNESS:  I had the pleasure of seeing Daija today when she came accompanied by her daughter for further followup of her AFib with a preserved EF.  She is a pleasant 82-year-old who I had the pleasure of meeting on 12/18/2017 to review a recent hospitalization.  She has a remote history of colon cancer and has paroxysmal atrial fibrillation, previously on Multaq.  She is originally from [place] and flew to [place] on 12/01.  Before getting on the plane she noted some slurred speech but refused to be seen and flew to [place].  Her symptoms worsened and she developed a right-sided facial droop and speech deficit.  She was out of the window for tPA when she got to [place], and she was ultimately diagnosed with an acute left posterolateral infarct.  She had previously refused anticoagulation despite a high CHADS-VASc score and had come in on only aspirin.  She did have some brief episodes of atrial fibrillation while in the hospital, and she was discharged in sinus rhythm on Multaq 400 twice daily, metoprolol tartrate 25 mg twice daily (new) and Eliquis 5 mg twice daily (new).  Her CHADS-VASc score was at least 5, with age over 75, female sex and CVA.  It was recommended she wear a 24-hour Holter monitor and see us before she went back to [place].      I saw her back on 12/18 and reviewed her 24-hour Holter monitor worn 12/11-12.  This showed both sinus rhythm and atrial fibrillation with no evidence of post-conversion pauses.  In sinus rhythm her average heart rate (on Multaq 400 b.i.d. and metoprolol tartrate 25 mg twice daily) was 53 (42, 72) bpm. When in atrial fibrillation she had an average heart rate of 90 (57, 158).      Her symptoms while wearing the monitor included an episode of extreme weakness and fatigue from 11:30 a.m. to 3:00 p.m.  She could not walk or keep her head up and had to lie down for 3 hours.  During that period of time, she was in atrial fibrillation with  "an average heart rate of roughly 100.  Interestingly, despite this \"extreme weakness and fatigue\" lasting roughly 4 hours, she remained in atrial fibrillation until almost 0800 the next morning.      When I last saw her, we discontinued Multaq, as her Holter monitor confirmed that this was not successful in maintaining sinus rhythm.  I had her continue metoprolol 25 mg twice daily and continue Eliquis.  For her mild tachy-maribell syndrome I recommended watchful waiting, noting that at this time it did not appear she required pacemaker implantation, but this was not out of the realm of possibilities down the road.        Unfortunately, she continued to have significant episodes of severe extreme weakness and fatigue.  She called in to our on-call physician on 12/31, and metoprolol was decreased from 25 to 12.5 mg twice daily.  She moved her appointment up by a day, and I am seeing her again to review this.      Daija denies dizziness or lightheadedness but does note occasional palpitations, especially when she lies down at night.  She denies chest pain, pressure or tightness.  Denies edema, orthopnea or PND.  Unfortunately, she continues to note episodes of these extreme weaknesses and actually tells me that she almost went into the ER this weekend because she felt so terribly.  Her daughter confirms that prior to her stroke and the addition of these medications, she was doing well and living independently. Daija tells me she had \"brief\" episodes of weakness and fatigue but that they were very infrequent and short lived.  She is now having them much more often and she is often on the couch or in bed, which is new for her.     She also states that she has been having yellowish-colored loose stools.  She denies any abdominal discomfort, denies nausea and states that she has a good appetite.      Blood work on 12/01 included a normal hemoglobin at 14.0 grams per deciliter and a TSH of 2.0.   EKG today showed Sinus rhythm with " "a PVC.      ASSESSMENT AND PLAN:     1.  Atrial fibrillation with evidence of tachybrady syndrome.  We again reviewed her Holter monitor, noting that her average heart rate in sinus rhythm was 53, and average heart rate in AFib was 90.  She had no evidence of post-conversion pauses.  Due to her extreme weakness and fatigue (see below), we will try her off of metoprolol tartrate 12.5 mg twice daily.  I would like her to call me in a week with an update.  I would also like her to see one of our electrophysiologists, as initially she was just set up with me as she was planning to go back to [place] quickly. I believe she was followed by Dr. Sauer while in the hospital.       As she has not done so yet, I do think that she needs an electrophysiologist to review her current issues to determine if procedure is required prior to her leaving to get back home.     She will continue Eliquis therapy.       2. Extreme weakness and fatigue.  We will check blood work today including a CMP, magnesium and hemoglobin.  Her TSH in the hospital was normal.  We will hold metoprolol as above.      I told her that I am not quite sure what is causing these episodes.  It certainly could be related to her atrial fibrillation, as she tells me she did have \"small episodes\" before she had her stroke when she was still in [place] despite being on Multaq.  It is certainly possible that episodes of AFib could be worsening her symptoms, but I also point out that today she is in sinus rhythm and feels poorly, and when she wore her Holter monitor she had a 3-4 hour long episode of severe weakness and fatigue but was in atrial fibrillation for much longer.    It could be related to metoprolol, though this is a new medication.  I would not expect it to be related to Eliquis     We will see how she does stopping metoprolol.  She will see an EP MD to determine a plan of care going forward.         MARIVEL CASTRO PA-C             D: " 2018 13:17   T: 2018 13:34   MT: ESTEPHANIE      Name:     KEYANNA SEGOVIA   MRN:      [id number]        Account:      [id number]   :      1935           Service Date: 2018      Document: [id number]

## 2018-01-04 NOTE — PATIENT INSTRUCTIONS
1. EKG today looked great! Normal rhythm    2. Blood work today (liver, kidneys, magnesium, hemoglobin)    3. HOLD metoprolol tartrate - call in 1 week with an update on your weakness/fatigue. [phone] (Anastasia Soto Bill)

## 2018-01-04 NOTE — LETTER
1/4/2018      Dia Davenport MD  [address] [facility] [address]  [facility] [address]      RE: Daija OSCAR Jenifer       Dear Colleague,    I had the pleasure of seeing Daija MOORE Jenifer in the [facility].    HISTORY OF PRESENT ILLNESS:  I had the pleasure of seeing Daija today when she came accompanied by her daughter for further followup of her AFib with a preserved EF.  She is a pleasant 82-year-old who I had the pleasure of meeting on 12/18/2017 to review a recent hospitalization.  She has a remote history of colon cancer and has paroxysmal atrial fibrillation, previously on Multaq.  She is originally from [place] and flew to [place] on 12/01.  Before getting on the plane she noted some slurred speech but refused to be seen and flew to [place].  Her symptoms worsened and she developed a right-sided facial droop and speech deficit.  She was out of the window for tPA when she got to [place], and she was ultimately diagnosed with an acute left posterolateral infarct.  She had previously refused anticoagulation despite a high CHADS-VASc score and had come in on only aspirin.  She did have some brief episodes of atrial fibrillation while in the hospital, and she was discharged in sinus rhythm on Multaq 400 twice daily, metoprolol tartrate 25 mg twice daily (new) and Eliquis 5 mg twice daily (new).  Her CHADS-VASc score was at least 5, with age over 75, female sex and CVA.  It was recommended she wear a 24-hour Holter monitor and see us before she went back to [place].      I saw her back on 12/18 and reviewed her 24-hour Holter monitor worn 12/11-12.  This showed both sinus rhythm and atrial fibrillation with no evidence of post-conversion pauses.  In sinus rhythm her average heart rate (on Multaq 400 b.i.d. and metoprolol tartrate 25 mg twice daily) was 53 (42, 72) bpm. When in atrial fibrillation she had an average heart rate of 90 (57, 158).      Her symptoms while wearing the monitor  "included an episode of extreme weakness and fatigue from 11:30 a.m. to 3:00 p.m.  She could not walk or keep her head up and had to lie down for 3 hours.  During that period of time, she was in atrial fibrillation with an average heart rate of roughly 100.  Interestingly, despite this \"extreme weakness and fatigue\" lasting roughly 4 hours, she remained in atrial fibrillation until almost 0800 the next morning.      When I last saw her, we discontinued Multaq, as her Holter monitor confirmed that this was not successful in maintaining sinus rhythm.  I had her continue metoprolol 25 mg twice daily and continue Eliquis.  For her mild tachy-maribell syndrome I recommended watchful waiting, noting that at this time it did not appear she required pacemaker implantation, but this was not out of the realm of possibilities down the road.        Unfortunately, she continued to have significant episodes of severe extreme weakness and fatigue.  She called in to our on-call physician on 12/31, and metoprolol was decreased from 25 to 12.5 mg twice daily.  She moved her appointment up by a day, and I am seeing her again to review this.      Daija denies dizziness or lightheadedness but does note occasional palpitations, especially when she lies down at night.  She denies chest pain, pressure or tightness.  Denies edema, orthopnea or PND.  Unfortunately, she continues to note episodes of these extreme weaknesses and actually tells me that she almost went into the ER this weekend because she felt so terribly.  Her daughter confirms that prior to her stroke and the addition of these medications, she was doing well and living independently. Daija tells me she had \"brief\" episodes of weakness and fatigue but that they were very infrequent and short lived.  She is now having them much more often and she is often on the couch or in bed, which is new for her.     She also states that she has been having yellowish-colored loose stools.  She " "denies any abdominal discomfort, denies nausea and states that she has a good appetite.      Blood work on 12/01 included a normal hemoglobin at 14.0 grams per deciliter and a TSH of 2.0.   EKG today showed Sinus rhythm with a PVC.      ASSESSMENT AND PLAN:     1.  Atrial fibrillation with evidence of tachybrady syndrome.  We again reviewed her Holter monitor, noting that her average heart rate in sinus rhythm was 53, and average heart rate in AFib was 90.  She had no evidence of post-conversion pauses.  Due to her extreme weakness and fatigue (see below), we will try her off of metoprolol tartrate 12.5 mg twice daily.  I would like her to call me in a week with an update.  I would also like her to see one of our electrophysiologists, as initially she was just set up with me as she was planning to go back to [place] quickly. I believe she was followed by Dr. Sauer while in the hospital.       As she has not done so yet, I do think that she needs an electrophysiologist to review her current issues to determine if procedure is required prior to her leaving to get back home.     She will continue Eliquis therapy.       2. Extreme weakness and fatigue.  We will check blood work today including a CMP, magnesium and hemoglobin.  Her TSH in the hospital was normal.  We will hold metoprolol as above.      I told her that I am not quite sure what is causing these episodes.  It certainly could be related to her atrial fibrillation, as she tells me she did have \"small episodes\" before she had her stroke when she was still in [place] despite being on Multaq.  It is certainly possible that episodes of AFib could be worsening her symptoms, but I also point out that today she is in sinus rhythm and feels poorly, and when she wore her Holter monitor she had a 3-4 hour long episode of severe weakness and fatigue but was in atrial fibrillation for much longer.    It could be related to metoprolol, though this is a new " medication.  I would not expect it to be related to Eliquis     We will see how she does stopping metoprolol.  She will see an EP MD to determine a plan of care going forward.       Outpatient Encounter Prescriptions as of 1/4/2018   Medication Sig Dispense Refill     apixaban ANTICOAGULANT (ELIQUIS) 5 MG tablet Take 1 tablet (5 mg) by mouth 2 times daily 120 tablet 3     pravastatin (PRAVACHOL) 10 MG tablet Take 2 tablets (20 mg) by mouth every evening (Patient taking differently: Take 10 mg by mouth every evening ) 120 tablet 0     Docusate Sodium (COLACE PO) Take 100 mg by mouth daily        ZONISAMIDE PO Take 100 mg by mouth At Bedtime        [DISCONTINUED] metoprolol (LOPRESSOR) 25 MG tablet Take 12.5 mg by mouth 2 times daily        No facility-administered encounter medications on file as of 1/4/2018.        Again, thank you for allowing me to participate in the care of your patient.      Sincerely,    Pao Martínez PA-C     [facility]

## 2018-01-04 NOTE — MR AVS SNAPSHOT
After Visit Summary   1/4/2018    Daija Burgess    MRN: [id number]           Patient Information     Date Of Birth          1935        Visit Information        Provider Department      1/4/2018 12:00 PM Pao Martínez PA-C [facility]   [place]        Today's Diagnoses     Paroxysmal atrial fibrillation (H)    -  1      Care Instructions    1. EKG today looked great! Normal rhythm    2. Blood work today (liver, kidneys, magnesium, hemoglobin)    3. HOLD metoprolol tartrate - call in 1 week with an update on your weakness/fatigue. [phone] (Anastasia Soto, Fam)                  Follow-ups after your visit        Additional Services     Follow-Up with Electrophysiologist                 Your next 10 appointments already scheduled     Jan 05, 2018 11:45 AM CST   Neuro Treatment with [email or web address] ([facility])    [address] [place] [address] [address]  [facility] [address]   [phone]            Jan 12, 2018 12:00 PM CST   [facility] ([facility])    [address] [place] [address] [address]  [facility] [address]   [phone]            Jan 19, 2018 10:30 AM CST   [facility] ([facility])    [address] [place] [address] [address]  [facility] [address]   [phone]            Jan 26, 2018  9:45 AM CST   [facility] ([facility])    [address] [place] [address] [address]  [facility] [address]   [phone]            Feb 02, 2018 10:30 AM CST   Neuro Treatment with [email or web address] ([facility])    [address] [place] [address] [address]  [facility] [address]   [phone]              Future tests that were ordered for you today     Open Future Orders        Priority Expected Expires Ordered    Follow-Up  "with Electrophysiologist Routine 2018    Magnesium Routine 2018    Comprehensive metabolic panel Routine 2018    CBC with platelets Routine 2018            Who to contact     If you have questions or need follow up information about today's clinic visit or your schedule please contact [facility] directly at [phone].  Normal or non-critical lab and imaging results will be communicated to you by WooWhohart, letter or phone within 4 business days after the clinic has received the results. If you do not hear from us within 7 days, please contact the clinic through WooWhohart or phone. If you have a critical or abnormal lab result, we will notify you by phone as soon as possible.  Submit refill requests through Zettics or call your pharmacy and they will forward the refill request to us. Please allow 3 business days for your refill to be completed.          Additional Information About Your Visit        [employer]Whoh[employer] Information     Zettics lets you send messages to your doctor, view your test results, renew your prescriptions, schedule appointments and more. To sign up, go to [email or web address] . Click on \"Log in\" on the left side of the screen, which will take you to the Welcome page. Then click on \"Sign up Now\" on the right side of the page.     You will be asked to enter the access code listed below, as well as some personal information. Please follow the directions to create your username and password.     Your access code is: [email or web address]  Expires: 3/4/2018 11:55 AM     Your access code will  in 90 days. If you need help or a new code, please call your [place] clinic or [phone].        Care EveryWhere ID     This is your Care EveryWhere ID. This could be used by other organizations to access your [place] medical records  [id number]        Your Vitals Were     Pulse Height BMI " "(Body Mass Index)             72 1.651 m (5' 5\") 34.61 kg/m2          Blood Pressure from Last 3 Encounters:   01/04/18 108/62   12/18/17 128/74   12/08/17 112/59    Weight from Last 3 Encounters:   01/04/18 94.3 kg (208 lb)   12/18/17 95.7 kg (211 lb)   12/08/17 96.6 kg (213 lb)              We Performed the Following     EKG 12-lead complete w/read - Clinics (to be scheduled)     Follow-Up with Cardiac Advanced Practice Provider          Today's Medication Changes          These changes are accurate as of: 1/4/18 12:55 PM.  If you have any questions, ask your nurse or doctor.               These medicines have changed or have updated prescriptions.        Dose/Directions    pravastatin 10 MG tablet   Commonly known as:  PRAVACHOL   This may have changed:  how much to take   Used for:  Cerebrovascular accident (CVA), unspecified mechanism (H)        Dose:  20 mg   Take 2 tablets (20 mg) by mouth every evening   Quantity:  120 tablet   Refills:  0         Stop taking these medicines if you haven't already. Please contact your care team if you have questions.     metoprolol 25 MG tablet   Commonly known as:  LOPRESSOR   Stopped by:  Pao Martínez PA-C                    Primary Care Provider Office Phone # Fax #    Dia Bairon Davenport [phone] [address] [facility] [address]  [facility] [address]        Equal Access to Services     [facility] Hospital[facility] AH: Hadii aad ku hadasho Soklarissaali, waaxda luqadaha, qaybta kaalmada adeegyada, waxisis awa gavin . So [facility] [phone].    ATENCIÓN: Si habla español, tiene a weinberg disposición servicios gratuitos de asistencia lingüística. [place] al [phone].    We comply with applicable federal civil rights laws and [place] laws. We do not discriminate on the basis of race, color, national origin, age, disability, sex, sexual orientation, or gender identity.            Thank you!     Thank you for choosing [facility]" CARE   [place]  for your care. Our goal is always to provide you with excellent care. Hearing back from our patients is one way we can continue to improve our services. Please take a few minutes to complete the written survey that you may receive in the mail after your visit with us. Thank you!             Your Updated Medication List - Protect others around you: Learn how to safely use, store and throw away your medicines at www.disposemymeds.org.          This list is accurate as of: 1/4/18 12:55 PM.  Always use your most recent med list.                   Brand Name Dispense Instructions for use Diagnosis    apixaban ANTICOAGULANT 5 MG tablet    ELIQUIS    120 tablet    Take 1 tablet (5 mg) by mouth 2 times daily    Cerebrovascular accident (CVA), unspecified mechanism (H)       COLACE PO      Take 100 mg by mouth daily        pravastatin 10 MG tablet    PRAVACHOL    120 tablet    Take 2 tablets (20 mg) by mouth every evening    Cerebrovascular accident (CVA), unspecified mechanism (H)       ZONISAMIDE PO      Take 100 mg by mouth At Bedtime

## 2018-01-09 ENCOUNTER — OFFICE VISIT (OUTPATIENT)
Dept: CARDIOLOGY | Facility: CLINIC | Age: 83
End: 2018-01-09
Attending: PHYSICIAN ASSISTANT
Payer: MEDICARE

## 2018-01-09 VITALS
HEIGHT: 65 IN | DIASTOLIC BLOOD PRESSURE: 81 MMHG | SYSTOLIC BLOOD PRESSURE: 143 MMHG | HEART RATE: 111 BPM | WEIGHT: 213.6 LBS | BODY MASS INDEX: 35.59 KG/M2

## 2018-01-09 DIAGNOSIS — I48.0 PAROXYSMAL ATRIAL FIBRILLATION (H): ICD-10-CM

## 2018-01-09 PROCEDURE — 99204 OFFICE O/P NEW MOD 45 MIN: CPT | Performed by: INTERNAL MEDICINE

## 2018-01-09 RX ORDER — DILTIAZEM HYDROCHLORIDE 120 MG/1
120 CAPSULE, COATED, EXTENDED RELEASE ORAL DAILY
Qty: 90 CAPSULE | Refills: 1 | Status: SHIPPED | OUTPATIENT
Start: 2018-01-09 | End: 2018-01-11 | Stop reason: ALTCHOICE

## 2018-01-09 RX ORDER — ESCITALOPRAM OXALATE 5 MG/1
5 TABLET ORAL DAILY
COMMUNITY

## 2018-01-09 NOTE — MR AVS SNAPSHOT
After Visit Summary   1/9/2018    Daija Burgess    MRN: [id number]           Patient Information     Date Of Birth          1935        Visit Information        Provider Department      1/9/2018 11:30 AM Prasanth Leary MD [facility]        Today's Diagnoses     Paroxysmal atrial fibrillation (H)           Follow-ups after your visit        Your next 10 appointments already scheduled     Jan 12, 2018 12:00 PM CST   Neuro Treatment with Ashleigh Marshall, SADI   [facility] ([facility])    [address] [place] [address] [address]  [facility] [address]   [phone]            Jan 19, 2018 10:30 AM CST   Neuro Treatment with Ashleigh Marshall, SADI   [facility] ([facility])    [address] [place] [address] [address]  [facility] [address]   [phone]            Jan 26, 2018  9:45 AM CST   Neuro Treatment with [facility] ([facility])    [address] [place] [address] [address]  [facility] [address]   [phone]            Feb 02, 2018 10:30 AM CST   Neuro Treatment with Ashleigh Marshall, SADI   [facility] ([facility])    [address] [place] [address]  [address]  [facility] [address]   [phone]              Who to contact     If you have questions or need follow up information about today's clinic visit or your schedule please contact [facility] directly at [phone].  Normal or non-critical lab and imaging results will be communicated to you by MyChart, letter or phone within 4 business days after the clinic has received the results. If you do not hear from us within 7 days, please contact the clinic through MyChart or phone. If you have a critical or abnormal lab result, we will notify you by phone as soon as possible.  Submit refill requests through [employer] or call your pharmacy and they will forward the refill request to  "us. Please allow 3 business days for your refill to be completed.          Additional Information About Your Visit        shoutrhart Information     [facility]most lets you send messages to your doctor, view your test results, renew your prescriptions, schedule appointments and more. To sign up, go to [email or web address] . Click on \"Log in\" on the left side of the screen, which will take you to the Welcome page. Then click on \"Sign up Now\" on the right side of the page.     You will be asked to enter the access code listed below, as well as some personal information. Please follow the directions to create your username and password.     Your access code is: [email or web address]  Expires: 3/4/2018 11:55 AM     Your access code will  in 90 days. If you need help or a new code, please call your [place] clinic or [phone].        Care EveryWhere ID     This is your [facility] EveryWhere ID. This could be used by other organizations to access your [place] medical records  [id number]        Your Vitals Were     Pulse Height BMI (Body Mass Index)             111 1.651 m (5' 5\") 35.54 kg/m2          Blood Pressure from Last 3 Encounters:   18 143/81   18 108/62   17 128/74    Weight from Last 3 Encounters:   18 96.9 kg (213 lb 9.6 oz)   18 94.3 kg (208 lb)   17 95.7 kg (211 lb)              We Performed the Following     Follow-Up with Electrophysiologist          Today's Medication Changes          These changes are accurate as of: 18 11:59 PM.  If you have any questions, ask your nurse or doctor.               Start taking these medicines.        Dose/Directions    diltiazem 120 MG 24 hr capsule   Commonly known as:  CARDIZEM CD   Used for:  Paroxysmal atrial fibrillation (H)   Started by:  Prasanth Leary MD        Dose:  120 mg   Take 1 capsule (120 mg) by mouth daily   Quantity:  90 capsule   Refills:  1         These medicines have changed or have updated prescriptions.        " Dose/Directions    pravastatin 10 MG tablet   Commonly known as:  PRAVACHOL   This may have changed:  how much to take   Used for:  Cerebrovascular accident (CVA), unspecified mechanism (H)        Dose:  20 mg   Take 2 tablets (20 mg) by mouth every evening   Quantity:  120 tablet   Refills:  0            Where to get your medicines      These medications were sent to [id number] - [place], [place] - [address]  [address], [place] [address]     Phone:  [phone]     diltiazem 120 MG 24 hr capsule                Primary Care Provider Office Phone # Fax #    Dia Bairon Davenport [phone] [address]  [place] [address]        Equal Access to Services     VIV VALENTIN : Jacqueline tafoyao Sosiri, waaxda luqadaha, qaybta kaalmada adeegyada, maggie gavin . So [facility] [phone].    ATENCIÓN: Si habla español, tiene a weinberg disposición servicios gratuitos de asistencia lingüística. [facility] [phone].    We comply with applicable federal civil rights laws and [place] laws. We do not discriminate on the basis of race, color, national origin, age, disability, sex, sexual orientation, or gender identity.            Thank you!     Thank you for choosing [facility]  for your care. Our goal is always to provide you with excellent care. Hearing back from our patients is one way we can continue to improve our services. Please take a few minutes to complete the written survey that you may receive in the mail after your visit with us. Thank you!             Your Updated Medication List - Protect others around you: Learn how to safely use, store and throw away your medicines at www.disposemymeds.org.          This list is accurate as of: 1/9/18 11:59 PM.  Always use your most recent med list.                   Brand Name Dispense Instructions for use Diagnosis    apixaban ANTICOAGULANT 5 MG tablet    ELIQUIS     120 tablet    Take 1 tablet (5 mg) by mouth 2 times daily    Cerebrovascular accident (CVA), unspecified mechanism (H)       COLACE PO      Take 100 mg by mouth daily        diltiazem 120 MG 24 hr capsule    CARDIZEM CD    90 capsule    Take 1 capsule (120 mg) by mouth daily    Paroxysmal atrial fibrillation (H)       LEXAPRO 5 MG tablet   Generic drug:  escitalopram      Take 5 mg by mouth daily        pravastatin 10 MG tablet    PRAVACHOL    120 tablet    Take 2 tablets (20 mg) by mouth every evening    Cerebrovascular accident (CVA), unspecified mechanism (H)       ZONISAMIDE PO      Take 100 mg by mouth At Bedtime

## 2018-01-09 NOTE — LETTER
1/9/2018      Dia Davenport MD  [address] [facility] [address]  [facility] [address]      RE: Daija MOORE Jenifer       Dear Colleague,    I had the pleasure of seeing Daija Burgess in the [facility].    REASON FOR VISIT:  Evaluation of paroxysmal atrial fibrillation and fatigue.      HISTORY OF PRESENT ILLNESS:  Ms. Burgess is a delightful 82-year-old lady with a history of paroxysmal atrial fibrillation, previous colon cancer and recent stroke who is here for evaluation of paroxysmal AFib and fatigue.       The patient has a long history of paroxysmal atrial fibrillation.  She has been on chronic therapy on Multaq.  She is originally from [place] and was visiting her daughter in [place], when she developed acute onset of slurred speech (12/18).  She was evaluated in the hospital and was found to have left posterolateral stroke.  She was treated with tPA and experienced a significant improvement in her symptoms.  During hospital stay, she was found to be in atrial fibrillation and Multaq was discontinued.  By the time of discharge, she was back in normal sinus rhythm and was started on beta blockers.      Later, she was evaluated by Jessica Martínez.  A Holter monitor was obtained and revealed episodes of paroxysmal AFib.  However, she seemed to be minimally symptomatic with the episodes.  No significant bradycardia or pauses were seen on monitor.  Due to fatigue, metoprolol dose was decreased and later was discontinued.        At the moment, she is doing well.  She informs that after discontinuation of metoprolol, her symptoms got better.  She no longer complains of fatigue.  She denies any other symptoms such as chest pain, shortness of breath, lightheadedness, near syncope or syncopal episode.      Of note, on admission here, her heart rate was 110, suggestive of possible atrial fibrillation.  However, by the time we did a physical exam, she was back in normal rhythm.  EKG obtained on 01/04  showed normal sinus rhythm with a single PVC.  Echocardiogram obtained 12/2017 showed EF of 55%-60%.      ASSESSMENT AND PLAN:   1.  Paroxysmal atrial fibrillation.  She failed therapy with Multaq and did not tolerate beta blockers.  She seems to be minimally symptomatic with atrial fibrillation.  Therefore, I believe we do not need to be aggressive with medical therapy at this time.  Since she admits having a few moments in which she may notice an elevated heart rate, I recommended starting small dose of diltiazem (120 mg daily).  She will let us know in a week how she feels with the new medication.   2.  Embolic prevention.  CHADS-VASc score of 5.  She will need anticoagulation indefinitely.  Continue Eliquis.   3.  Followup care.  Most likely, she will follow up with her doctor in [place].       Outpatient Encounter Prescriptions as of 1/9/2018   Medication Sig Dispense Refill     escitalopram (LEXAPRO) 5 MG tablet Take 5 mg by mouth daily       apixaban ANTICOAGULANT (ELIQUIS) 5 MG tablet Take 1 tablet (5 mg) by mouth 2 times daily 120 tablet 3     pravastatin (PRAVACHOL) 10 MG tablet Take 2 tablets (20 mg) by mouth every evening (Patient taking differently: Take 10 mg by mouth every evening ) 120 tablet 0     Docusate Sodium (COLACE PO) Take 100 mg by mouth daily        ZONISAMIDE PO Take 100 mg by mouth At Bedtime        [DISCONTINUED] diltiazem (CARDIZEM CD) 120 MG 24 hr capsule Take 1 capsule (120 mg) by mouth daily 90 capsule 1     No facility-administered encounter medications on file as of 1/9/2018.        Again, thank you for allowing me to participate in the care of your patient.      Sincerely,    Prasanth Leary MD     [facility]

## 2018-01-10 NOTE — PROGRESS NOTES
"Electrophysiology/ Clinic Note         H&P and Plan:     [id number]    Prasanth Leary MD    Physical Exam:  Vitals: /81  Pulse 111  Ht 1.651 m (5' 5\")  Wt 96.9 kg (213 lb 9.6 oz)  BMI 35.54 kg/m2    Constitutional:  AAO x3.  Pt is in NAD.  HEAD: normocephalic.  SKIN: Skin normal color, texture and turgor with no lesions or eruptions.  Eyes: PERRL, EOMI.  ENT:  Supple, normal JVP. No lymphadenopathy or thyroid enlargement.  Chest:  CTAB.  Cardiac:   RRR, normal  S1 and S2.  No murmurs rubs or gallop.    Abdomen:  Normal BS.  Soft, non-tender and non-distended.  No rebound or guarding.    Extremities:  Pedious pulses palpable B/L.  No LE edema noticed.   Neurological: Strength and sensation grossly symmetric and intact throughout.       CURRENT MEDICATIONS:  Current Outpatient Prescriptions   Medication Sig Dispense Refill     escitalopram (LEXAPRO) 5 MG tablet Take 5 mg by mouth daily       diltiazem (CARDIZEM CD) 120 MG 24 hr capsule Take 1 capsule (120 mg) by mouth daily 90 capsule 1     apixaban ANTICOAGULANT (ELIQUIS) 5 MG tablet Take 1 tablet (5 mg) by mouth 2 times daily 120 tablet 3     pravastatin (PRAVACHOL) 10 MG tablet Take 2 tablets (20 mg) by mouth every evening (Patient taking differently: Take 10 mg by mouth every evening ) 120 tablet 0     Docusate Sodium (COLACE PO) Take 100 mg by mouth daily        ZONISAMIDE PO Take 100 mg by mouth At Bedtime          ALLERGIES   No Known Allergies    PAST MEDICAL HISTORY:  Past Medical History:   Diagnosis Date     A-fib (H) 2017     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     Cancer (H)      CVA (cerebral vascular accident) (H) 2017     Depression 12/13/2017     Depressive disorder      Vertigo        PAST SURGICAL HISTORY:  Past Surgical History:   Procedure Laterality Date     CHOLECYSTECTOMY       COLONOSCOPY       ORTHOPEDIC SURGERY         FAMILY HISTORY:  Family History   Problem Relation Age of Onset     Coronary Artery Disease Early Onset No family hx " of        SOCIAL HISTORY:  Social History     Social History     Marital status:      Spouse name: N/A     Number of children: N/A     Years of education: N/A     Social History Main Topics     Smoking status: Never Smoker     Smokeless tobacco: Never Used     Alcohol use No     Drug use: No     Sexual activity: Not Asked     Other Topics Concern     None     Social History Narrative       Review of Systems:  Skin:  Negative     Eyes:  Positive for glasses  ENT:  Positive for postnasal drainage  Respiratory:  Positive for dyspnea on exertion  Cardiovascular:    Positive for;fatigue;lightheadedness;dizziness  Gastroenterology: Negative for melena;hematochezia  Genitourinary:  not assessed    Musculoskeletal:  Positive for arthritis  Neurologic:  Positive for headaches;migraine headaches  Psychiatric:  Positive for anxiety  Heme/Lymph/Imm:  Positive for allergies  Endocrine:  Negative        Recent Lab Results:  LIPID RESULTS:  Lab Results   Component Value Date    CHOL 137 12/01/2017    HDL 65 12/01/2017    LDL 53 12/01/2017    TRIG 93 12/01/2017       LIVER ENZYME RESULTS:  Lab Results   Component Value Date    AST 15 01/04/2018    ALT 23 01/04/2018       CBC RESULTS:  Lab Results   Component Value Date    WBC 7.1 01/04/2018    RBC 4.66 01/04/2018    HGB 14.6 01/04/2018    HCT 43.8 01/04/2018    MCV 94 01/04/2018    MCH 31.3 01/04/2018    MCHC 33.3 01/04/2018    RDW 13.4 01/04/2018     01/04/2018       BMP RESULTS:  Lab Results   Component Value Date     01/04/2018    POTASSIUM 4.4 01/04/2018    CHLORIDE 109 01/04/2018    CO2 27 01/04/2018    ANIONGAP 4 01/04/2018     (H) 01/04/2018    BUN 20 01/04/2018    CR 1.03 01/04/2018    GFRESTIMATED 51 (L) 01/04/2018    GFRESTBLACK 62 01/04/2018    FAIAN 9.1 01/04/2018        A1C RESULTS:  Lab Results   Component Value Date    A1C 6.2 (H) 12/01/2017       INR RESULTS:  Lab Results   Component Value Date    INR 0.98 12/01/2017          ECHOCARDIOGRAM  Recent Results (from the past 8760 hour(s))   Echocardiogram Complete    Narrative    [id number]  [facility]  [id number]  [id number]^MAY^TYREE^DOV           [facility]  Echocardiography Laboratory  [address] [place] [address]        Name: KEYANNA SEGOVIA  MRN: [id number]  : 1935  Study Date: 2017 10:31 AM  Age: 82 yrs  Gender: Female  Patient Location: [facility]  Reason For Study: Afib  Ordering Physician: TYREE MCKEON  Referring Physician: TYREE MCKEON  Performed By: Caity Mccarty RDCS     BSA: 2.0 m2  Height: 65 in  Weight: 217 lb  HR: 97  BP: 142/74 mmHg  _____________________________________________________________________________  __        Procedure  Complete Echo Adult.  _____________________________________________________________________________  __        Interpretation Summary     The rhythm was atrial flutter.  The left ventricle is normal in size.  Left ventricular systolic function is normal.  The visual ejection fraction is estimated at 55-60%.  Normal left ventricular wall motion  The left atrium is mildly dilated.  There is no comparison study available.  _____________________________________________________________________________  __        Left Ventricle  The left ventricle is normal in size. There is normal left ventricular wall  thickness. Left ventricular systolic function is normal. The visual ejection  fraction is estimated at 55-60%. Normal left ventricular wall motion.     Right Ventricle  The right ventricle is normal in structure, function and size.     Atria  The left atrium is mildly dilated. Right atrial size is normal. There is no  atrial shunt seen.     Mitral Valve  The mitral valve is normal in structure and function. There is trace mitral  regurgitation.        Tricuspid Valve  The tricuspid valve is normal in structure and function. There is trace  tricuspid regurgitation. Right ventricular systolic pressure could not  be  approximated due to inadequate tricuspid regurgitation. Normal IVC (1.5-2.5cm)  with >50% respiratory collapse; right atrial pressure is estimated at 5-  10mmHg.     Aortic Valve  The aortic valve is normal in structure and function. No aortic regurgitation  is present. No aortic stenosis is present.     Pulmonic Valve  The pulmonic valve is not well seen, but is grossly normal. There is trace  pulmonic valvular regurgitation.     Vessels  Normal size aorta. The IVC is normal in size and reactivity with respiration,  suggesting normal central venous pressure.     Pericardium  There is no pericardial effusion.        Rhythm  The rhythm was atrial flutter.  _____________________________________________________________________________  __  MMode/2D Measurements & Calculations  IVSd: 1.1 cm     LVIDd: 3.9 cm  LVIDs: 3.3 cm  LVPWd: 1.1 cm  FS: 16.5 %  EDV(Teich): 67.3 ml  ESV(Teich): 43.7 ml  LV mass(C)d: 138.8 grams  LV mass(C)dI: 67.8 grams/m2  Ao root diam: 3.2 cm  LA dimension: 3.4 cm  asc Aorta Diam: 3.4 cm  LA/Ao: 1.1  LVOT diam: 2.1 cm  LVOT area: 3.6 cm2  LA Volume (BP): 69.8 ml  LA Volume Index (BP): 34.0 ml/m2  RWT: 0.56           Doppler Measurements & Calculations  MV E max tucker: 90.9 cm/sec  MV dec time: 0.22 sec  E/E' avg: 10.8  Lateral E/e': 11.8  Medial E/e': 9.9           _____________________________________________________________________________  __           Report approved by: Rabia Urban 12/03/2017 12:03 PM            No orders of the defined types were placed in this encounter.    Orders Placed This Encounter   Medications     escitalopram (LEXAPRO) 5 MG tablet     Sig: Take 5 mg by mouth daily     diltiazem (CARDIZEM CD) 120 MG 24 hr capsule     Sig: Take 1 capsule (120 mg) by mouth daily     Dispense:  90 capsule     Refill:  1     There are no discontinued medications.      Encounter Diagnosis   Name Primary?     Paroxysmal atrial fibrillation (H)          CC  Dia Davenport,  MD  [address]  [place] [id number]

## 2018-01-11 RX ORDER — DILTIAZEM HYDROCHLORIDE 120 MG/1
120 CAPSULE, EXTENDED RELEASE ORAL PRN
Qty: 60 CAPSULE | COMMUNITY
Start: 2018-01-11

## 2018-01-11 RX ORDER — DILTIAZEM HYDROCHLORIDE 120 MG/1
120 CAPSULE, EXTENDED RELEASE ORAL PRN
Qty: 60 CAPSULE | COMMUNITY
Start: 2018-01-11 | End: 2018-01-11 | Stop reason: ALTCHOICE

## 2018-01-11 NOTE — TELEPHONE ENCOUNTER
Pt and daughter Carly called and stated that Diltiazem was started at [facility] by Dr Leary and pt took one tab and had severe weakness, dizziness and lightheaded.  Pt is very sensitive to medication and also was very sensitive to Metoprolol also. Discussed with Dr Leary and stated that pt should stop medication and can use as a pill in the pocket. Discussed that when pt is symptomatic with the A Fib, she could wait 30 minutes or so to see if she converts back to SR. Pt was very happy to not take another dose as she was feeling so well when off the metoprolol and then terrible again with the Diltiazem.  Also discussed that another option would be an AVN ablation and PPM if further A Fib and pt not tolerating medications.  Pt at this time is wanting to feel well and be able to get back to [place] and has already for an EP MD to see. Pt will come by and fill out an LISA for clinic in [place], so that records can be sent. Dr Leary also stated that if pt would like to f/u before heading back home to call. No further questions at this time. Vera

## 2018-01-12 NOTE — PROGRESS NOTES
Outpatient Speech Language Pathology Discharge Note     Patient: Daija Burgess  : 1935    Beginning/End Dates of Reporting Period:  2017 to 2018    Referring Provider: Dr. Amber MD     Therapy Diagnosis: Mild-moderate dysarthria     Client Self Report: Mrs. Burgess is an 82 y.o. Female who was admitted to [facility] after arriving via plane to see family for the holidays, with word finding difficulties, slurred speech, and right-handed weakness. Results of MRI confirmed CVA in L lateral cortex of L frontal lobe. Please see speech-language evaluation report dated 2017 for further information. At this time Pt has completed 5 treatment sessions and has cancelled remaining sessions as she will be moving back home to [place].     Objective Measurements: See below:   Goal 1-Word level-/r/ words=75%, improves to 95% with repetition. /s/ words=90% with 0-min cues and mild to moderate reduction in rate (may be d/t fatigue-reduced volume noted as well). Sentence level /r/ targets=70%, with mod cues (Pt demoing. good self monitoring and correction) imrpoves to 85% /s/ sentences=70%, imrpoving to 80% with close self monitoring. 8-9 word sentneces=80% with min to mod cues and good self monitoring. Pt completed recommended oral motor exercises following SLP model for imrpoved R labial and buccal movement (pucker-smile, lip seal/press, lip smack, and cheek puff with alternating unilateral pressure) with 90% accuracy.      Goals:  Goal Identifier     Goal Description Patient will learn and implement independently 2-3 strategies to increase speech intelligibilty at the word and phrase level to >95% intelligiblity to meet daily speech needs.   Target Date 18   Date Met   2018   Progress: Goal met for word and phrase level with moderate cues. Pt's development of self monitoring and correction of errors ongoing.      Goal Identifier     Goal Description Patient will learn and implement  independently 2-3 strategies to increase speech intelligibilty to >95% across at 20 minute conversation to meet daily speech needs.   Target Date 02/26/18   Date Met      Progress: Goal not met. At sentence level, Pt 70-80% accurate for 8-10+ word sentences. At conversation Pt judged to be 75-80% accurate by a trained listener (treating SLP). Pt developing her self monitoring and correction skills, Pt demonstrating adequate intelligibility for all verbal communication independently at this time, though criteria of 95% has not yet been reached.    Progress Toward Goals:   Progress this reporting period: Excellent, though Pt was not always compliant with all HEP, SLP suspects Pt excessive fatigue levels and lack of wellness (numerous medication changes ongoing to see if improvements could be made) are suspected to have impacted Pt motivation. At this time Pt is motivated and ready to return to her home in [place], though not all speech goals have been met with set criteria Pt is demonstrating adequate intelligibility for verbal communication across all environments.        Plan:  Discharge from therapy.    Discharge: Yes    Reason for Discharge: Patient chooses to discontinue therapy.  Medicare G-code: Patient did not attend a final scheduled session prior to discharge. Unable to determine discharge functional status.    Discharge Plan: Patient to continue home program.    Thank you for referring Daija Burgess to outpatient therapy at [facility] in [place].  Please call Ashleigh Marshall MA, SLP-CCC at [phone] or email [email or web address] with any questions or concerns.      Ashleigh Marshall M.A., SLP-CCC  Speech-Language Pathologist

## 2018-01-15 NOTE — PROGRESS NOTES
REASON FOR VISIT:  Evaluation of paroxysmal atrial fibrillation and fatigue.      HISTORY OF PRESENT ILLNESS:  Ms. Burgess is a delightful 82-year-old lady with a history of paroxysmal atrial fibrillation, previous colon cancer and recent stroke who is here for evaluation of paroxysmal AFib and fatigue.       The patient has a long history of paroxysmal atrial fibrillation.  She has been on chronic therapy on Multaq.  She is originally from [place] and was visiting her daughter in [place], when she developed acute onset of slurred speech (12/18).  She was evaluated in the hospital and was found to have left posterolateral stroke.  She was treated with tPA and experienced a significant improvement in her symptoms.  During hospital stay, she was found to be in atrial fibrillation and Multaq was discontinued.  By the time of discharge, she was back in normal sinus rhythm and was started on beta blockers.      Later, she was evaluated by Jessica Martínez.  A Holter monitor was obtained and revealed episodes of paroxysmal AFib.  However, she seemed to be minimally symptomatic with the episodes.  No significant bradycardia or pauses were seen on monitor.  Due to fatigue, metoprolol dose was decreased and later was discontinued.        At the moment, she is doing well.  She informs that after discontinuation of metoprolol, her symptoms got better.  She no longer complains of fatigue.  She denies any other symptoms such as chest pain, shortness of breath, lightheadedness, near syncope or syncopal episode.      Of note, on admission here, her heart rate was 110, suggestive of possible atrial fibrillation.  However, by the time we did a physical exam, she was back in normal rhythm.  EKG obtained on 01/04 showed normal sinus rhythm with a single PVC.  Echocardiogram obtained 12/2017 showed EF of 55%-60%.      ASSESSMENT AND PLAN:   1.  Paroxysmal atrial fibrillation.  She failed therapy with Multaq and did not tolerate  beta blockers.  She seems to be minimally symptomatic with atrial fibrillation.  Therefore, I believe we do not need to be aggressive with medical therapy at this time.  Since she admits having a few moments in which she may notice an elevated heart rate, I recommended starting small dose of diltiazem (120 mg daily).  She will let us know in a week how she feels with the new medication.   2.  Embolic prevention.  CHADS-VASc score of 5.  She will need anticoagulation indefinitely.  Continue Eliquis.   3.  Followup care.  Most likely, she will follow up with her doctor in [place].         ZOE SHEPHERD MD             D: 01/10/2018 12:25   T: 01/10/2018 13:18   MT: NAKIA      Name:     KEYANNA SEGOVIA   MRN:      [id number]        Account:      [id number]   :      1935           Service Date: 2018      Document: [id number]

## 2024-05-24 ENCOUNTER — RX ONLY (RX ONLY)
Age: 89
End: 2024-05-24

## 2024-05-24 ENCOUNTER — DOCTOR'S OFFICE (OUTPATIENT)
Dept: URBAN - NONMETROPOLITAN AREA CLINIC 1 | Facility: CLINIC | Age: 89
Setting detail: OPHTHALMOLOGY
End: 2024-05-24

## 2024-05-24 DIAGNOSIS — B02.9: ICD-10-CM

## 2024-05-24 PROCEDURE — NO CHARGE N/C PROFESSIONAL COURTESY: Performed by: OPHTHALMOLOGY

## 2024-05-24 ASSESSMENT — CONFRONTATIONAL VISUAL FIELD TEST (CVF)
OD_FINDINGS: FULL
OS_FINDINGS: FULL